# Patient Record
Sex: FEMALE | Race: WHITE | NOT HISPANIC OR LATINO | Employment: OTHER | URBAN - METROPOLITAN AREA
[De-identification: names, ages, dates, MRNs, and addresses within clinical notes are randomized per-mention and may not be internally consistent; named-entity substitution may affect disease eponyms.]

---

## 2017-06-01 ENCOUNTER — HOSPITAL ENCOUNTER (OUTPATIENT)
Dept: MAMMOGRAPHY | Facility: CLINIC | Age: 67
Discharge: HOME | End: 2017-06-01
Attending: FAMILY MEDICINE

## 2017-06-01 DIAGNOSIS — Z12.31 VISIT FOR SCREENING MAMMOGRAM: ICD-10-CM

## 2017-06-05 ENCOUNTER — RECORDS - HEALTHEAST (OUTPATIENT)
Dept: LAB | Facility: CLINIC | Age: 67
End: 2017-06-05

## 2017-06-05 LAB
ALT SERPL-CCNC: 10 U/L (ref 0–45)
AST SERPL-CCNC: 13 U/L (ref 0–40)
CHOLEST SERPL-MCNC: 237 MG/DL
CREAT SERPL-MCNC: 0.78 MG/DL (ref 0.6–1.1)
FASTING STATUS PATIENT QL REPORTED: YES
GFR SERPL CREATININE-BSD FRML MDRD: >60 ML/MIN/1.73M2
GLUCOSE SERPL-MCNC: 85 MG/DL (ref 70–125)
HDLC SERPL-MCNC: 68 MG/DL
LDLC SERPL CALC-MCNC: 144 MG/DL
POTASSIUM SERPL-SCNC: 4.3 MMOL/L (ref 3.5–5)
TRIGL SERPL-MCNC: 125 MG/DL

## 2017-12-01 ENCOUNTER — RECORDS - HEALTHEAST (OUTPATIENT)
Dept: LAB | Facility: CLINIC | Age: 67
End: 2017-12-01

## 2017-12-01 ENCOUNTER — TRANSFERRED RECORDS (OUTPATIENT)
Dept: HEALTH INFORMATION MANAGEMENT | Facility: CLINIC | Age: 67
End: 2017-12-01

## 2017-12-01 LAB
ALT SERPL-CCNC: 12 U/L (ref 0–45)
AST SERPL-CCNC: 18 U/L (ref 0–40)
CHOLEST SERPL-MCNC: 240 MG/DL
CREAT SERPL-MCNC: 0.71 MG/DL (ref 0.6–1.1)
FASTING STATUS PATIENT QL REPORTED: YES
GFR SERPL CREATININE-BSD FRML MDRD: >60 ML/MIN/1.73M2
GLUCOSE SERPL-MCNC: 93 MG/DL (ref 70–125)
HDLC SERPL-MCNC: 67 MG/DL
LDLC SERPL CALC-MCNC: 153 MG/DL
POTASSIUM SERPL-SCNC: 4.6 MMOL/L (ref 3.5–5)
TRIGL SERPL-MCNC: 99 MG/DL

## 2017-12-29 ENCOUNTER — TRANSFERRED RECORDS (OUTPATIENT)
Dept: HEALTH INFORMATION MANAGEMENT | Facility: CLINIC | Age: 67
End: 2017-12-29

## 2018-03-19 ENCOUNTER — PRE VISIT (OUTPATIENT)
Dept: NEUROLOGY | Facility: CLINIC | Age: 68
End: 2018-03-19

## 2018-03-19 NOTE — TELEPHONE ENCOUNTER
PREVISIT INFORMATION                                                    Anais Shipmanrs scheduled for future visit at [facility] specialty clinics.    Patient is scheduled to see Dr Hernandez on 3/27  Reason for visit:   Referring provider   Has patient seen previous specialist? Yes.  Name of provider Dr Hernandez.   Medical Records:      REVIEW                                                      New patient packet mailed to patient:  Medication reconciliation complete:       Current Outpatient Prescriptions   Medication Sig Dispense Refill     AMOXICILLIN PO Take  by mouth 2 times daily.       FEXOFENADINE  MG OR TABS As needed  .        TRIAZOLAM 0.25 MG OR TABS 1 TABLET AT BEDTIME AS NEEDED       LISINOPRIL 10 MG OR TABS 1 TABLET DAILY       PREMARIN 0.9 MG OR TABS 1 TABLET DAILY       CENTRUM SILVER OR 1 daily        VITAMIN D-3 OR 1 daily       CALCIUM 600 600 MG OR TABS 1 daily       FIBER OR TABS as needed       FLONASE 50 MCG/ACT NA SUSP as needed         Allergies: Codeine and Diphenhydramine        PLAN/FOLLOW-UP NEEDED                                                      The following is needed before upcoming appointment. The pt will call back to complete the previsit.*    Patient Reminders Given:  Please, make sure you bring an updated list of your medications.   If you are having a procedure, please, present 15 minutes early.  If you need to cancel or reschedule,please call [phone].    Roberta Villagran

## 2018-03-27 ENCOUNTER — OFFICE VISIT (OUTPATIENT)
Dept: NEUROLOGY | Facility: CLINIC | Age: 68
End: 2018-03-27
Payer: COMMERCIAL

## 2018-03-27 VITALS
SYSTOLIC BLOOD PRESSURE: 137 MMHG | WEIGHT: 156.6 LBS | BODY MASS INDEX: 24.58 KG/M2 | HEIGHT: 67 IN | DIASTOLIC BLOOD PRESSURE: 92 MMHG | OXYGEN SATURATION: 98 % | HEART RATE: 75 BPM

## 2018-03-27 DIAGNOSIS — G62.9 SMALL FIBER NEUROPATHY: Primary | ICD-10-CM

## 2018-03-27 PROCEDURE — 99202 OFFICE O/P NEW SF 15 MIN: CPT | Performed by: PSYCHIATRY & NEUROLOGY

## 2018-03-27 RX ORDER — LIDOCAINE 50 MG/G
PATCH TOPICAL
Qty: 30 PATCH | Refills: 1 | Status: SHIPPED | OUTPATIENT
Start: 2018-03-27 | End: 2021-01-01

## 2018-03-27 RX ORDER — OLOPATADINE HYDROCHLORIDE 2 MG/ML
1 SOLUTION/ DROPS OPHTHALMIC DAILY PRN
COMMUNITY
End: 2021-01-01

## 2018-03-27 RX ORDER — LORAZEPAM 1 MG/1
TABLET ORAL
Refills: 2 | COMMUNITY
Start: 2018-03-17 | End: 2021-01-01

## 2018-03-27 ASSESSMENT — PAIN SCALES - GENERAL: PAINLEVEL: NO PAIN (1)

## 2018-03-27 NOTE — PROGRESS NOTES
March 27, 2018      Fernando Sosa MD   [facility]    [address] [place] [address]      Dear Dr. Sosa:       I saw Anais Kelly at the [facility] in [place] today.  I saw her in 2009 and 2011 for symptoms and signs of an idiopathic small fiber neuropathy.  She comes today reporting some progression of symptoms with occasional burning discomfort and shooting pains in the feet and fingertips.  Feet at times become red and often feel hot subjectively even though they are cold to the touch.  The foot discomfort does bother her particularly at night.  She is otherwise in good health.      EXAMINATION:  Mental state and cranial nerve examinations are normal.  Perception of light touch is preserved.  Vibration scores are variable but are generally between 3 and 6 at the great toes.  Pinprick perception is reduced distal to the proximal calves.  Tone, bulk, strength and rapid repetitive movements are normal in all 4 limbs.  Coordination is normal.  Reflexes are 1+ throughout and plantar responses are downgoing.  Gait is normal.  Romberg sign is not present.  Examination of the feet demonstrates normal color, intact skin, and well preserved pedal pulses.      LABORATORY STUDIES:  Not available through Epic.  I do see that a random blood sugar and CMP were normal in 2017 and her lipid panel demonstrates an elevated total cholesterol.  There is no family history of a similar problem.      I explained to Ms. Kelly that her signs are consistent with an idiopathic small fiber neuropathy and indeed her skin biopsy many years ago was reportedly supportive of this diagnosis, although I had difficulty finding the report today.  Her examination demonstrates an interval rostral progression of pinprick sensory deficits with no other changes.  I discussed the prognosis somewhat.  I indicated that I would be interested in reviewing laboratory findings  obtained previously through your clinic.  She has completed a record request for this.  She is interested in treatment to alleviate the discomfort at night and I have started her on Lidoderm patches.  She is welcome to return in 1 year or as needed.         Sincerely,      DIANA AHMADI MD             D: 2018   T: 2018   MT: SYLVIA      Name:     ORTEGA REESE   MRN:      [id number]        Account:      [id number]   :      1950      Document: [id number]       cc: Fernando Sosa MD

## 2018-03-27 NOTE — NURSING NOTE
"Anais Shipmanrs's goals for this visit include: consult  She requests these members of her care team be copied on today's visit information:     PCP: Fernando Sosa    Referring Provider:  Referred Self, MD  No address on file    Chief Complaint   Patient presents with     Consult       Initial BP (!) 137/92  Pulse 75  Ht 1.702 m (5' 7\")  Wt 71 kg (156 lb 9.6 oz)  SpO2 98%  BMI 24.53 kg/m2 Estimated body mass index is 24.53 kg/(m^2) as calculated from the following:    Height as of this encounter: 1.702 m (5' 7\").    Weight as of this encounter: 71 kg (156 lb 9.6 oz).  Medication Reconciliation: complete    Do you need any medication refills at today's visit? n  "

## 2018-03-27 NOTE — MR AVS SNAPSHOT
After Visit Summary   3/27/2018    Anais Kelly    MRN: [id number]           Patient Information     Date Of Birth          1950        Visit Information        Provider Department      3/27/2018 1:00 PM Joel Hernandez MD [facility] [[email or web address]]        Today's Diagnoses     Small fiber neuropathy    -  1      Care Instructions    1. Try lidoderm patches on the soles of your feet at night. If that does not help or is not covered, call and we can try something else (a gel). Apply only to intact skin.   2. We will request records from Dr. Sosa's office. I will let you know if there are other lab tests that I recommend.             Follow-ups after your visit        Who to contact     If you have questions or need follow up information about today's clinic visit or your schedule please contact [facility] directly at [phone].  Normal or non-critical lab and imaging results will be communicated to you by MyChart, letter or phone within 4 business days after the clinic has received the results. If you do not hear from us within 7 days, please contact the clinic through [employer] or phone. If you have a critical or abnormal lab result, we will notify you by phone as soon as possible.  Submit refill requests through [employer] or call your pharmacy and they will forward the refill request to us. Please allow 3 business days for your refill to be completed.          Additional Information About Your Visit        MyChart Information     Sh[employer] is an electronic gateway that provides easy, online access to your medical records. With [employer], you can request a clinic appointment, read your test results, renew a prescription or communicate with your care team.     To sign up for [employer] visit the website at [email or web address]   You will be asked to enter the access code listed below, as well as some personal information. Please follow the directions to create your username  "and password.     Your access code is: [email or web address]  Expires: 2018  1:59 PM     Your access code will  in 90 days. If you need help or a new code, please contact your [facility] or call [phone] for assistance.        Care EveryWhere ID     This is your Care EveryWhere ID. This could be used by other organizations to access your [facility] medical records  [id number]        Your Vitals Were     Pulse Height Pulse Oximetry BMI (Body Mass Index)          75 1.702 m (5' 7\") 98% 24.53 kg/m2         Blood Pressure from Last 3 Encounters:   18 (!) 137/92   11 137/99   09 144/88    Weight from Last 3 Encounters:   18 71 kg (156 lb 9.6 oz)   11 72.4 kg (159 lb 9.6 oz)   08 73 kg (161 lb)              Today, you had the following     No orders found for display         Today's Medication Changes          These changes are accurate as of 3/27/18  1:59 PM.  If you have any questions, ask your nurse or doctor.               Start taking these medicines.        Dose/Directions    lidocaine 5 % Patch   Commonly known as:  LIDODERM   Used for:  Small fiber neuropathy   Started by:  Joel Hernandez MD        Apply to soles of feet at night and remove in AM. Do not wear more than 12 hours out of 24 and apply only to intact skin.   Quantity:  30 patch   Refills:  1         Stop taking these medicines if you haven't already. Please contact your care team if you have questions.     AMOXICILLIN PO   Stopped by:  Joel Hernandez MD                Where to get your medicines      Call your pharmacy to confirm that your medication is ready for pickup. It may take up to 24 hours for them to receive the prescription. If the prescription is not ready within 3 business days, please contact your clinic or your provider.     We will let you know when these medications are ready. If you don't hear back within 3 business days, please contact us.     lidocaine 5 % Patch    "             Primary Care Provider Office Phone # Fax #    Fernando Sosa [phone]       [facility] [address] [place] [address]        Equal Access to Services     SHWETA MONTALVO : Hadii yeny padilla emilee Soerika, waservandoda luqadaha, qaybta kaalmada neema, tanvi cassidybrit gordon. So [facility] [phone].    ATENCIÓN: Si habla español, tiene a real disposición servicios gratuitos de asistencia lingüística. Llame al [phone].    We comply with applicable federal civil rights laws and [place] laws. We do not discriminate on the basis of race, color, national origin, age, disability, sex, sexual orientation, or gender identity.            Thank you!     Thank you for choosing [facility]  for your care. Our goal is always to provide you with excellent care. Hearing back from our patients is one way we can continue to improve our services. Please take a few minutes to complete the written survey that you may receive in the mail after your visit with us. Thank you!             Your Updated Medication List - Protect others around you: Learn how to safely use, store and throw away your medicines at www.disposemymeds.org.          This list is accurate as of 3/27/18  1:59 PM.  Always use your most recent med list.                   Brand Name Dispense Instructions for use Diagnosis    BENTYL PO      Take 10 mg by mouth 2 times daily        calcium carbonate 600 MG tablet   Generic drug:  calcium carbonate      1 daily        CENTRUM SILVER PO      1 daily        fexofenadine 180 MG tablet    ALLEGRA     As needed  .        Fiber Tabs      as needed        FLONASE 50 MCG/ACT spray   Generic drug:  fluticasone      as needed        lidocaine 5 % Patch    LIDODERM    30 patch    Apply to soles of feet at night and remove in AM. Do not wear more than 12 hours out of 24 and apply only to intact skin.    Small fiber neuropathy       lisinopril 10 MG  tablet    PRINIVIL/ZESTRIL     1 TABLET DAILY        LORazepam 1 MG tablet    ATIVAN     TAKE 1 TABLET BY MOUTH AT BEDTIME AS NEEDED FOR SLEEP        omeprazole 20 MG CR capsule    priLOSEC     daily        PATADAY 0.2 % Soln   Generic drug:  olopatadine HCl      1 drop daily as needed        PREMARIN 0.9 MG Tabs tablet   Generic drug:  estrogens conjugated      1 TABLET DAILY        VITAMIN D-3 OR      5000 units 1 daily

## 2018-03-27 NOTE — PATIENT INSTRUCTIONS
1. Try lidoderm patches on the soles of your feet at night. If that does not help or is not covered, call and we can try something else (a gel). Apply only to intact skin.   2. We will request records from Dr. Sosa's office. I will let you know if there are other lab tests that I recommend.

## 2018-04-02 ENCOUNTER — RECORDS - HEALTHEAST (OUTPATIENT)
Dept: ADMINISTRATIVE | Facility: OTHER | Age: 68
End: 2018-04-02

## 2018-04-06 ENCOUNTER — COMMUNICATION - HEALTHEAST (OUTPATIENT)
Dept: TELEHEALTH | Facility: CLINIC | Age: 68
End: 2018-04-06

## 2018-04-06 ENCOUNTER — HOSPITAL ENCOUNTER (OUTPATIENT)
Dept: CT IMAGING | Facility: CLINIC | Age: 68
Discharge: HOME | End: 2018-04-06

## 2018-04-06 ENCOUNTER — TELEPHONE (OUTPATIENT)
Dept: NEUROLOGY | Facility: CLINIC | Age: 68
End: 2018-04-06

## 2018-04-06 DIAGNOSIS — R10.84 DIFFUSE ABDOMINAL PAIN: ICD-10-CM

## 2018-04-06 DIAGNOSIS — G62.9 SMALL FIBER NEUROPATHY: Primary | ICD-10-CM

## 2018-04-06 LAB
CREAT BLD-MCNC: 0.8 MG/DL
POC GFR AMER AF HE - HISTORICAL: >60 ML/MIN/1.73M2
POC GFR NON AMER AF HE - HISTORICAL: >60 ML/MIN/1.73M2

## 2018-04-09 ENCOUNTER — RECORDS - HEALTHEAST (OUTPATIENT)
Dept: ADMINISTRATIVE | Facility: OTHER | Age: 68
End: 2018-04-09

## 2018-04-09 NOTE — TELEPHONE ENCOUNTER
Central Prior Authorization Team   Phone: [phone]      PA Initiation    Medication: clonidine 0.1% plo cream - compound- PA Initiated  Insurance Company: [employer] - Phone [phone] Fax [phone]  Pharmacy Filling the Rx: [facility] - [place], [place] - [address]  Filling Pharmacy Phone: [phone]  Filling Pharmacy Fax: [phone]  Start Date: 4/9/2018

## 2018-04-10 ENCOUNTER — RECORDS - HEALTHEAST (OUTPATIENT)
Dept: ADMINISTRATIVE | Facility: OTHER | Age: 68
End: 2018-04-10

## 2018-04-10 NOTE — TELEPHONE ENCOUNTER
Dr Hernandez, the Clonidine gel was denied. You mentioned that we could try the Ketamine, Gabapentin and Amitriptyline gel. This is pending in  for you to review and sign.

## 2018-04-10 NOTE — TELEPHONE ENCOUNTER
PRIOR AUTHORIZATION DENIED    Medication: clonidine 0.1% plo cream - compound- P/A DENIED    Denial Date: 4/10/2018    Denial Rational: Bulk powders are not covered        Appeal Information:

## 2018-04-11 NOTE — TELEPHONE ENCOUNTER
The pt was notified that the Clonidine gel PA was denied. She was informed that another RX for the combination gel (ketamine+Gabapentin+Amitriptyline) was sent to the pharmacy to see if that will be covered. She verbalized understanding.  Roberta Villagran RN

## 2018-04-16 ENCOUNTER — TELEPHONE (OUTPATIENT)
Dept: PALLIATIVE MEDICINE | Facility: CLINIC | Age: 68
End: 2018-04-16

## 2018-04-16 NOTE — TELEPHONE ENCOUNTER
A prior authorization is needed for the following medication prescribed.  Please complete a prior authorization with the information included below.    Medication:FV-Ketamine 5%, Amitrip 3%, Gabapentin 4% in PLO (compounded)  Ingredients: Ketamine Powder [address]-94, Amitriptyline powder [address]-86, Gabapentin Powder [address]-66, Advanced cream [address]-74  RX #:[id number]  Reason for Rejection:Drug excluded    Pharmacy Insurance plan: Part D  BIN #: [id number]  ID #: [id number]  PCN #: [id number]  Phone #: [phone]      Pharmacy NPI:[id number]      Please advise the pharmacy when the prior authorization is approved or denied.     Thank you for your time.    [facility]  [phone]

## 2018-04-17 DIAGNOSIS — G62.9 SMALL FIBER NEUROPATHY: ICD-10-CM

## 2018-04-17 DIAGNOSIS — R79.89 OTHER SPECIFIED ABNORMAL FINDINGS OF BLOOD CHEMISTRY: ICD-10-CM

## 2018-04-17 LAB
HBA1C MFR BLD: 5.3 % (ref 0–5.6)
VIT B12 SERPL-MCNC: 396 PG/ML (ref 193–986)

## 2018-04-17 PROCEDURE — 86160 COMPLEMENT ANTIGEN: CPT | Performed by: PSYCHIATRY & NEUROLOGY

## 2018-04-17 PROCEDURE — 36415 COLL VENOUS BLD VENIPUNCTURE: CPT | Performed by: PSYCHIATRY & NEUROLOGY

## 2018-04-17 PROCEDURE — 82607 VITAMIN B-12: CPT | Performed by: PSYCHIATRY & NEUROLOGY

## 2018-04-17 PROCEDURE — 86235 NUCLEAR ANTIGEN ANTIBODY: CPT | Performed by: PSYCHIATRY & NEUROLOGY

## 2018-04-17 PROCEDURE — 83921 ORGANIC ACID SINGLE QUANT: CPT | Mod: 90 | Performed by: PSYCHIATRY & NEUROLOGY

## 2018-04-17 PROCEDURE — 83036 HEMOGLOBIN GLYCOSYLATED A1C: CPT | Performed by: PSYCHIATRY & NEUROLOGY

## 2018-04-17 PROCEDURE — 99000 SPECIMEN HANDLING OFFICE-LAB: CPT | Performed by: PSYCHIATRY & NEUROLOGY

## 2018-04-18 LAB
C3 SERPL-MCNC: 93 MG/DL (ref 76–169)
C4 SERPL-MCNC: 14 MG/DL (ref 15–50)
ENA SS-A IGG SER IA-ACNC: <0.2 AI (ref 0–0.9)
ENA SS-B IGG SER IA-ACNC: <0.2 AI (ref 0–0.9)

## 2018-04-19 LAB — METHYLMALONATE SERPL-SCNC: 0.18 UMOL/L (ref 0–0.4)

## 2018-04-20 NOTE — TELEPHONE ENCOUNTER
I called patient and left message communicating Dr. Hernandez's message. Pt has our number to call back with questions. Ree Pastor RN

## 2018-04-20 NOTE — TELEPHONE ENCOUNTER
I think she should discuss medications for pain with her PCP. If he would like my input on neuropathic pain options she should make another appointment.

## 2018-04-20 NOTE — TELEPHONE ENCOUNTER
Central Prior Authorization Team   Phone: [phone]    PA Initiation    Medication: FV-Ketamine 5%, Amitrip 3%, Gabapentin 4% in PLO (compounded)-Initiated-  Insurance Company: [employer] - Phone [phone] Fax [phone]  Pharmacy Filling the Rx: [facility] - [place], [place] - [address]  Filling Pharmacy Phone: [phone]  Filling Pharmacy Fax:    Start Date: 4/20/2018

## 2018-04-20 NOTE — TELEPHONE ENCOUNTER
PRIOR AUTHORIZATION DENIED    Medication: FV-Ketamine 5%, Amitrip 3%, Gabapentin 4% in PLO (compounded)-P/A DENIED    Denial Date: 4/20/2018    Denial Rational: Bulk compounding powders are not covered        Appeal Information:

## 2018-06-21 ENCOUNTER — RECORDS - HEALTHEAST (OUTPATIENT)
Dept: ADMINISTRATIVE | Facility: OTHER | Age: 68
End: 2018-06-21

## 2018-07-06 ENCOUNTER — RECORDS - HEALTHEAST (OUTPATIENT)
Dept: ADMINISTRATIVE | Facility: OTHER | Age: 68
End: 2018-07-06

## 2018-07-10 ENCOUNTER — HOSPITAL ENCOUNTER (OUTPATIENT)
Dept: MAMMOGRAPHY | Facility: CLINIC | Age: 68
Discharge: HOME | End: 2018-07-10
Attending: FAMILY MEDICINE

## 2018-07-10 DIAGNOSIS — Z12.31 VISIT FOR SCREENING MAMMOGRAM: ICD-10-CM

## 2018-07-17 ENCOUNTER — OFFICE VISIT - HEALTHEAST (OUTPATIENT)
Dept: INTERNAL MEDICINE | Facility: CLINIC | Age: 68
End: 2018-07-17

## 2018-07-17 ENCOUNTER — COMMUNICATION - HEALTHEAST (OUTPATIENT)
Dept: TELEHEALTH | Facility: CLINIC | Age: 68
End: 2018-07-17

## 2018-07-17 DIAGNOSIS — R20.0 NUMBNESS AND TINGLING IN LEFT ARM: ICD-10-CM

## 2018-07-17 DIAGNOSIS — R10.31 ABDOMINAL PAIN, CHRONIC, BILATERAL LOWER QUADRANT: ICD-10-CM

## 2018-07-17 DIAGNOSIS — R20.2 NUMBNESS AND TINGLING IN LEFT ARM: ICD-10-CM

## 2018-07-17 DIAGNOSIS — G62.9 SMALL FIBER NEUROPATHY: ICD-10-CM

## 2018-07-17 DIAGNOSIS — R10.31 ABDOMINAL PAIN, RIGHT LOWER QUADRANT: ICD-10-CM

## 2018-07-17 DIAGNOSIS — F51.01 PRIMARY INSOMNIA: ICD-10-CM

## 2018-07-17 DIAGNOSIS — G89.29 ABDOMINAL PAIN, CHRONIC, BILATERAL LOWER QUADRANT: ICD-10-CM

## 2018-07-17 DIAGNOSIS — I10 BENIGN ESSENTIAL HYPERTENSION: ICD-10-CM

## 2018-07-17 DIAGNOSIS — G60.9 IDIOPATHIC PERIPHERAL NEUROPATHY: ICD-10-CM

## 2018-07-17 DIAGNOSIS — R10.32 ABDOMINAL PAIN, CHRONIC, BILATERAL LOWER QUADRANT: ICD-10-CM

## 2018-07-17 DIAGNOSIS — K58.0 IRRITABLE BOWEL SYNDROME WITH DIARRHEA: ICD-10-CM

## 2018-07-17 LAB
ALBUMIN SERPL-MCNC: 3.9 G/DL (ref 3.5–5)
ALBUMIN UR-MCNC: NEGATIVE MG/DL
ALP SERPL-CCNC: 84 U/L (ref 45–120)
ALT SERPL W P-5'-P-CCNC: 10 U/L (ref 0–45)
ANION GAP SERPL CALCULATED.3IONS-SCNC: 10 MMOL/L (ref 5–18)
APPEARANCE UR: CLEAR
AST SERPL W P-5'-P-CCNC: 13 U/L (ref 0–40)
BACTERIA #/AREA URNS HPF: ABNORMAL HPF
BASOPHILS # BLD AUTO: 0.1 THOU/UL (ref 0–0.2)
BASOPHILS NFR BLD AUTO: 1 % (ref 0–2)
BILIRUB SERPL-MCNC: 0.4 MG/DL (ref 0–1)
BILIRUB UR QL STRIP: NEGATIVE
BUN SERPL-MCNC: 16 MG/DL (ref 8–28)
C REACTIVE PROTEIN LHE: 0.4 MG/DL (ref 0–0.8)
CALCIUM SERPL-MCNC: 10.9 MG/DL (ref 8.5–10.5)
CHLORIDE BLD-SCNC: 104 MMOL/L (ref 98–107)
CO2 SERPL-SCNC: 25 MMOL/L (ref 22–31)
COLOR UR AUTO: YELLOW
CREAT SERPL-MCNC: 0.78 MG/DL (ref 0.6–1.1)
EOSINOPHIL # BLD AUTO: 0.2 THOU/UL (ref 0–0.4)
EOSINOPHIL NFR BLD AUTO: 3 % (ref 0–6)
ERYTHROCYTE [DISTWIDTH] IN BLOOD BY AUTOMATED COUNT: 12.2 % (ref 11–14.5)
ERYTHROCYTE [SEDIMENTATION RATE] IN BLOOD BY WESTERGREN METHOD: 3 MM/HR (ref 0–20)
GFR SERPL CREATININE-BSD FRML MDRD: >60 ML/MIN/1.73M2
GLUCOSE BLD-MCNC: 88 MG/DL (ref 70–125)
GLUCOSE UR STRIP-MCNC: NEGATIVE MG/DL
HCT VFR BLD AUTO: 41 % (ref 35–47)
HGB BLD-MCNC: 13.6 G/DL (ref 12–16)
HGB UR QL STRIP: NEGATIVE
KETONES UR STRIP-MCNC: NEGATIVE MG/DL
LEUKOCYTE ESTERASE UR QL STRIP: ABNORMAL
LYMPHOCYTES # BLD AUTO: 2 THOU/UL (ref 0.8–4.4)
LYMPHOCYTES NFR BLD AUTO: 31 % (ref 20–40)
MAGNESIUM SERPL-MCNC: 2.1 MG/DL (ref 1.8–2.6)
MCH RBC QN AUTO: 31.1 PG (ref 27–34)
MCHC RBC AUTO-ENTMCNC: 33.3 G/DL (ref 32–36)
MCV RBC AUTO: 94 FL (ref 80–100)
MONOCYTES # BLD AUTO: 0.6 THOU/UL (ref 0–0.9)
MONOCYTES NFR BLD AUTO: 9 % (ref 2–10)
NEUTROPHILS # BLD AUTO: 3.6 THOU/UL (ref 2–7.7)
NEUTROPHILS NFR BLD AUTO: 55 % (ref 50–70)
NITRATE UR QL: NEGATIVE
PH UR STRIP: 7 [PH] (ref 5–8)
PLATELET # BLD AUTO: 252 THOU/UL (ref 140–440)
PMV BLD AUTO: 6.9 FL (ref 7–10)
POTASSIUM BLD-SCNC: 4.5 MMOL/L (ref 3.5–5)
PROT SERPL-MCNC: 6.8 G/DL (ref 6–8)
RBC # BLD AUTO: 4.37 MILL/UL (ref 3.8–5.4)
RBC #/AREA URNS AUTO: ABNORMAL HPF
SODIUM SERPL-SCNC: 139 MMOL/L (ref 136–145)
SP GR UR STRIP: 1.01 (ref 1–1.03)
SQUAMOUS #/AREA URNS AUTO: ABNORMAL LPF
TSH SERPL DL<=0.005 MIU/L-ACNC: 3.02 UIU/ML (ref 0.3–5)
UROBILINOGEN UR STRIP-ACNC: ABNORMAL
VIT B12 SERPL-MCNC: 420 PG/ML (ref 213–816)
WBC #/AREA URNS AUTO: ABNORMAL HPF
WBC: 6.5 THOU/UL (ref 4–11)

## 2018-07-17 ASSESSMENT — MIFFLIN-ST. JEOR: SCORE: 1225.85

## 2018-07-18 LAB — BACTERIA SPEC CULT: NO GROWTH

## 2018-07-19 LAB — METHYLMALONATE SERPL-SCNC: 14.36 UMOL/L (ref 0–0.4)

## 2018-07-22 ENCOUNTER — AMBULATORY - HEALTHEAST (OUTPATIENT)
Dept: INTERNAL MEDICINE | Facility: CLINIC | Age: 68
End: 2018-07-22

## 2018-07-23 ENCOUNTER — COMMUNICATION - HEALTHEAST (OUTPATIENT)
Dept: INTERNAL MEDICINE | Facility: CLINIC | Age: 68
End: 2018-07-23

## 2018-07-24 ENCOUNTER — AMBULATORY - HEALTHEAST (OUTPATIENT)
Dept: NURSING | Facility: CLINIC | Age: 68
End: 2018-07-24

## 2018-07-24 ENCOUNTER — RECORDS - HEALTHEAST (OUTPATIENT)
Dept: ADMINISTRATIVE | Facility: OTHER | Age: 68
End: 2018-07-24

## 2018-07-30 ENCOUNTER — RECORDS - HEALTHEAST (OUTPATIENT)
Dept: ADMINISTRATIVE | Facility: OTHER | Age: 68
End: 2018-07-30

## 2018-07-30 ENCOUNTER — AMBULATORY - HEALTHEAST (OUTPATIENT)
Dept: INTERNAL MEDICINE | Facility: CLINIC | Age: 68
End: 2018-07-30

## 2018-07-30 DIAGNOSIS — E83.52 HYPERCALCEMIA: ICD-10-CM

## 2018-07-30 DIAGNOSIS — E53.8 VITAMIN B12 DEFICIENCY (NON ANEMIC): ICD-10-CM

## 2018-08-09 ENCOUNTER — AMBULATORY - HEALTHEAST (OUTPATIENT)
Dept: NURSING | Facility: CLINIC | Age: 68
End: 2018-08-09

## 2018-08-22 ENCOUNTER — OFFICE VISIT - HEALTHEAST (OUTPATIENT)
Dept: INTERNAL MEDICINE | Facility: CLINIC | Age: 68
End: 2018-08-22

## 2018-08-22 DIAGNOSIS — Z98.890 HX OF ABDOMINAL SURGERY: ICD-10-CM

## 2018-08-22 DIAGNOSIS — R10.30 LOWER ABDOMINAL PAIN: ICD-10-CM

## 2018-09-12 ENCOUNTER — OFFICE VISIT - HEALTHEAST (OUTPATIENT)
Dept: SURGERY | Facility: CLINIC | Age: 68
End: 2018-09-12

## 2018-09-12 DIAGNOSIS — R10.13 ABDOMINAL PAIN, EPIGASTRIC: ICD-10-CM

## 2018-09-12 ASSESSMENT — MIFFLIN-ST. JEOR: SCORE: 1232.2

## 2018-09-13 ENCOUNTER — AMBULATORY - HEALTHEAST (OUTPATIENT)
Dept: NURSING | Facility: CLINIC | Age: 68
End: 2018-09-13

## 2018-09-25 ENCOUNTER — OFFICE VISIT - HEALTHEAST (OUTPATIENT)
Dept: PHYSICAL THERAPY | Facility: REHABILITATION | Age: 68
End: 2018-09-25

## 2018-09-25 DIAGNOSIS — M79.18 MYOFASCIAL PAIN: ICD-10-CM

## 2018-09-25 DIAGNOSIS — R10.84 GENERALIZED ABDOMINAL PAIN: ICD-10-CM

## 2018-09-28 ENCOUNTER — OFFICE VISIT - HEALTHEAST (OUTPATIENT)
Dept: PHYSICAL THERAPY | Facility: REHABILITATION | Age: 68
End: 2018-09-28

## 2018-09-28 DIAGNOSIS — M79.18 MYOFASCIAL PAIN: ICD-10-CM

## 2018-09-28 DIAGNOSIS — R10.84 GENERALIZED ABDOMINAL PAIN: ICD-10-CM

## 2018-10-10 ENCOUNTER — COMMUNICATION - HEALTHEAST (OUTPATIENT)
Dept: INTERNAL MEDICINE | Facility: CLINIC | Age: 68
End: 2018-10-10

## 2018-10-10 ENCOUNTER — OFFICE VISIT - HEALTHEAST (OUTPATIENT)
Dept: PHYSICAL THERAPY | Facility: REHABILITATION | Age: 68
End: 2018-10-10

## 2018-10-10 DIAGNOSIS — M79.18 MYOFASCIAL PAIN: ICD-10-CM

## 2018-10-10 DIAGNOSIS — R10.84 GENERALIZED ABDOMINAL PAIN: ICD-10-CM

## 2018-10-11 ENCOUNTER — AMBULATORY - HEALTHEAST (OUTPATIENT)
Dept: NURSING | Facility: CLINIC | Age: 68
End: 2018-10-11

## 2018-10-20 ENCOUNTER — RECORDS - HEALTHEAST (OUTPATIENT)
Dept: ADMINISTRATIVE | Facility: OTHER | Age: 68
End: 2018-10-20

## 2018-10-24 ENCOUNTER — OFFICE VISIT - HEALTHEAST (OUTPATIENT)
Dept: PHYSICAL THERAPY | Facility: REHABILITATION | Age: 68
End: 2018-10-24

## 2018-10-24 DIAGNOSIS — R10.84 GENERALIZED ABDOMINAL PAIN: ICD-10-CM

## 2018-10-24 DIAGNOSIS — M79.18 MYOFASCIAL PAIN: ICD-10-CM

## 2018-11-05 ENCOUNTER — OFFICE VISIT - HEALTHEAST (OUTPATIENT)
Dept: PHYSICAL THERAPY | Facility: REHABILITATION | Age: 68
End: 2018-11-05

## 2018-11-05 ENCOUNTER — RECORDS - HEALTHEAST (OUTPATIENT)
Dept: ADMINISTRATIVE | Facility: OTHER | Age: 68
End: 2018-11-05

## 2018-11-05 DIAGNOSIS — R10.84 GENERALIZED ABDOMINAL PAIN: ICD-10-CM

## 2018-11-05 DIAGNOSIS — M79.18 MYOFASCIAL PAIN: ICD-10-CM

## 2018-11-13 ENCOUNTER — AMBULATORY - HEALTHEAST (OUTPATIENT)
Dept: NURSING | Facility: CLINIC | Age: 68
End: 2018-11-13

## 2018-11-16 ENCOUNTER — OFFICE VISIT - HEALTHEAST (OUTPATIENT)
Dept: PHYSICAL THERAPY | Facility: REHABILITATION | Age: 68
End: 2018-11-16

## 2018-11-16 DIAGNOSIS — R10.84 GENERALIZED ABDOMINAL PAIN: ICD-10-CM

## 2018-11-16 DIAGNOSIS — M79.18 MYOFASCIAL PAIN: ICD-10-CM

## 2018-11-23 ENCOUNTER — OFFICE VISIT - HEALTHEAST (OUTPATIENT)
Dept: PHYSICAL THERAPY | Facility: REHABILITATION | Age: 68
End: 2018-11-23

## 2018-11-23 DIAGNOSIS — M79.18 MYOFASCIAL PAIN: ICD-10-CM

## 2018-11-23 DIAGNOSIS — R10.84 GENERALIZED ABDOMINAL PAIN: ICD-10-CM

## 2018-11-26 ENCOUNTER — RECORDS - HEALTHEAST (OUTPATIENT)
Dept: ADMINISTRATIVE | Facility: OTHER | Age: 68
End: 2018-11-26

## 2018-11-30 ENCOUNTER — OFFICE VISIT - HEALTHEAST (OUTPATIENT)
Dept: PHYSICAL THERAPY | Facility: REHABILITATION | Age: 68
End: 2018-11-30

## 2018-11-30 DIAGNOSIS — M79.18 MYOFASCIAL PAIN: ICD-10-CM

## 2018-11-30 DIAGNOSIS — R10.84 GENERALIZED ABDOMINAL PAIN: ICD-10-CM

## 2018-12-03 ENCOUNTER — OFFICE VISIT - HEALTHEAST (OUTPATIENT)
Dept: PHYSICAL THERAPY | Facility: REHABILITATION | Age: 68
End: 2018-12-03

## 2018-12-03 DIAGNOSIS — M79.18 MYOFASCIAL PAIN: ICD-10-CM

## 2018-12-03 DIAGNOSIS — R10.84 GENERALIZED ABDOMINAL PAIN: ICD-10-CM

## 2018-12-11 ENCOUNTER — OFFICE VISIT - HEALTHEAST (OUTPATIENT)
Dept: PHYSICAL THERAPY | Facility: REHABILITATION | Age: 68
End: 2018-12-11

## 2018-12-11 DIAGNOSIS — R10.84 GENERALIZED ABDOMINAL PAIN: ICD-10-CM

## 2018-12-11 DIAGNOSIS — M79.18 MYOFASCIAL PAIN: ICD-10-CM

## 2018-12-12 ENCOUNTER — OFFICE VISIT - HEALTHEAST (OUTPATIENT)
Dept: INTERNAL MEDICINE | Facility: CLINIC | Age: 68
End: 2018-12-12

## 2018-12-12 DIAGNOSIS — N64.4 BREAST PAIN, LEFT: ICD-10-CM

## 2018-12-12 DIAGNOSIS — F43.22 ADJUSTMENT DISORDER WITH ANXIETY: ICD-10-CM

## 2018-12-12 ASSESSMENT — MIFFLIN-ST. JEOR: SCORE: 1246.26

## 2018-12-18 ENCOUNTER — OFFICE VISIT - HEALTHEAST (OUTPATIENT)
Dept: PHYSICAL THERAPY | Facility: REHABILITATION | Age: 68
End: 2018-12-18

## 2018-12-18 DIAGNOSIS — R10.84 GENERALIZED ABDOMINAL PAIN: ICD-10-CM

## 2018-12-18 DIAGNOSIS — M79.18 MYOFASCIAL PAIN: ICD-10-CM

## 2018-12-28 ENCOUNTER — OFFICE VISIT - HEALTHEAST (OUTPATIENT)
Dept: PHYSICAL THERAPY | Facility: REHABILITATION | Age: 68
End: 2018-12-28

## 2018-12-28 DIAGNOSIS — R10.84 GENERALIZED ABDOMINAL PAIN: ICD-10-CM

## 2018-12-28 DIAGNOSIS — M79.18 MYOFASCIAL PAIN: ICD-10-CM

## 2018-12-31 ENCOUNTER — HOSPITAL ENCOUNTER (OUTPATIENT)
Dept: MAMMOGRAPHY | Facility: CLINIC | Age: 68
Discharge: HOME | End: 2018-12-31
Attending: INTERNAL MEDICINE

## 2018-12-31 DIAGNOSIS — N64.4 BREAST PAIN, LEFT: ICD-10-CM

## 2019-01-03 ENCOUNTER — COMMUNICATION - HEALTHEAST (OUTPATIENT)
Dept: ADMINISTRATIVE | Facility: CLINIC | Age: 69
End: 2019-01-03

## 2019-01-08 ENCOUNTER — AMBULATORY - HEALTHEAST (OUTPATIENT)
Dept: NURSING | Facility: CLINIC | Age: 69
End: 2019-01-08

## 2019-02-05 ENCOUNTER — OFFICE VISIT - HEALTHEAST (OUTPATIENT)
Dept: PHYSICAL THERAPY | Facility: REHABILITATION | Age: 69
End: 2019-02-05

## 2019-02-05 DIAGNOSIS — M79.18 MYOFASCIAL PAIN: ICD-10-CM

## 2019-02-05 DIAGNOSIS — R10.84 GENERALIZED ABDOMINAL PAIN: ICD-10-CM

## 2019-02-12 ENCOUNTER — AMBULATORY - HEALTHEAST (OUTPATIENT)
Dept: NURSING | Facility: CLINIC | Age: 69
End: 2019-02-12

## 2019-02-12 ENCOUNTER — OFFICE VISIT - HEALTHEAST (OUTPATIENT)
Dept: PHYSICAL THERAPY | Facility: REHABILITATION | Age: 69
End: 2019-02-12

## 2019-02-12 DIAGNOSIS — M79.18 MYOFASCIAL PAIN: ICD-10-CM

## 2019-02-12 DIAGNOSIS — R10.84 GENERALIZED ABDOMINAL PAIN: ICD-10-CM

## 2019-02-19 ENCOUNTER — OFFICE VISIT - HEALTHEAST (OUTPATIENT)
Dept: PHYSICAL THERAPY | Facility: REHABILITATION | Age: 69
End: 2019-02-19

## 2019-02-19 DIAGNOSIS — M79.18 MYOFASCIAL PAIN: ICD-10-CM

## 2019-02-19 DIAGNOSIS — R10.84 GENERALIZED ABDOMINAL PAIN: ICD-10-CM

## 2019-03-12 ENCOUNTER — OFFICE VISIT - HEALTHEAST (OUTPATIENT)
Dept: PHYSICAL THERAPY | Facility: REHABILITATION | Age: 69
End: 2019-03-12

## 2019-03-12 DIAGNOSIS — R10.84 GENERALIZED ABDOMINAL PAIN: ICD-10-CM

## 2019-03-12 DIAGNOSIS — M79.18 MYOFASCIAL PAIN: ICD-10-CM

## 2019-03-19 ENCOUNTER — OFFICE VISIT - HEALTHEAST (OUTPATIENT)
Dept: PHYSICAL THERAPY | Facility: REHABILITATION | Age: 69
End: 2019-03-19

## 2019-03-19 ENCOUNTER — AMBULATORY - HEALTHEAST (OUTPATIENT)
Dept: NURSING | Facility: CLINIC | Age: 69
End: 2019-03-19

## 2019-03-19 DIAGNOSIS — M79.18 MYOFASCIAL PAIN: ICD-10-CM

## 2019-03-19 DIAGNOSIS — R10.84 GENERALIZED ABDOMINAL PAIN: ICD-10-CM

## 2019-04-02 ENCOUNTER — COMMUNICATION - HEALTHEAST (OUTPATIENT)
Dept: PHARMACY | Facility: CLINIC | Age: 69
End: 2019-04-02

## 2019-04-09 ENCOUNTER — AMBULATORY - HEALTHEAST (OUTPATIENT)
Dept: PHARMACY | Facility: CLINIC | Age: 69
End: 2019-04-09

## 2019-04-09 ENCOUNTER — OFFICE VISIT - HEALTHEAST (OUTPATIENT)
Dept: PHYSICAL THERAPY | Facility: REHABILITATION | Age: 69
End: 2019-04-09

## 2019-04-09 DIAGNOSIS — Z78.9 TAKES DIETARY SUPPLEMENTS: ICD-10-CM

## 2019-04-09 DIAGNOSIS — Z79.890 HORMONE REPLACEMENT THERAPY: ICD-10-CM

## 2019-04-09 DIAGNOSIS — M79.18 MYOFASCIAL PAIN: ICD-10-CM

## 2019-04-09 DIAGNOSIS — R10.84 GENERALIZED ABDOMINAL PAIN: ICD-10-CM

## 2019-04-09 DIAGNOSIS — K58.0 IRRITABLE BOWEL SYNDROME WITH DIARRHEA: ICD-10-CM

## 2019-04-09 DIAGNOSIS — I10 BENIGN ESSENTIAL HYPERTENSION: ICD-10-CM

## 2019-04-09 DIAGNOSIS — F51.01 PRIMARY INSOMNIA: ICD-10-CM

## 2019-04-30 ENCOUNTER — OFFICE VISIT - HEALTHEAST (OUTPATIENT)
Dept: PHYSICAL THERAPY | Facility: REHABILITATION | Age: 69
End: 2019-04-30

## 2019-04-30 ENCOUNTER — AMBULATORY - HEALTHEAST (OUTPATIENT)
Dept: NURSING | Facility: CLINIC | Age: 69
End: 2019-04-30

## 2019-04-30 DIAGNOSIS — M79.18 MYOFASCIAL PAIN: ICD-10-CM

## 2019-04-30 DIAGNOSIS — R10.84 GENERALIZED ABDOMINAL PAIN: ICD-10-CM

## 2019-05-09 ENCOUNTER — OFFICE VISIT - HEALTHEAST (OUTPATIENT)
Dept: FAMILY MEDICINE | Facility: CLINIC | Age: 69
End: 2019-05-09

## 2019-05-09 DIAGNOSIS — I10 BENIGN ESSENTIAL HYPERTENSION: ICD-10-CM

## 2019-05-09 DIAGNOSIS — Z79.890 HORMONE REPLACEMENT THERAPY, POSTMENOPAUSAL: ICD-10-CM

## 2019-05-09 DIAGNOSIS — G47.09 OTHER INSOMNIA: ICD-10-CM

## 2019-05-09 DIAGNOSIS — Z79.899 CONTROLLED SUBSTANCE AGREEMENT SIGNED: ICD-10-CM

## 2019-05-09 DIAGNOSIS — K58.0 IRRITABLE BOWEL SYNDROME WITH DIARRHEA: ICD-10-CM

## 2019-05-16 ENCOUNTER — COMMUNICATION - HEALTHEAST (OUTPATIENT)
Dept: SCHEDULING | Facility: CLINIC | Age: 69
End: 2019-05-16

## 2019-05-21 ENCOUNTER — COMMUNICATION - HEALTHEAST (OUTPATIENT)
Dept: CARE COORDINATION | Facility: CLINIC | Age: 69
End: 2019-05-21

## 2019-05-21 ENCOUNTER — COMMUNICATION - HEALTHEAST (OUTPATIENT)
Dept: FAMILY MEDICINE | Facility: CLINIC | Age: 69
End: 2019-05-21

## 2019-05-23 ENCOUNTER — COMMUNICATION - HEALTHEAST (OUTPATIENT)
Dept: INFECTIOUS DISEASES | Facility: CLINIC | Age: 69
End: 2019-05-23

## 2019-05-24 ENCOUNTER — OFFICE VISIT - HEALTHEAST (OUTPATIENT)
Dept: FAMILY MEDICINE | Facility: CLINIC | Age: 69
End: 2019-05-24

## 2019-05-24 DIAGNOSIS — N89.8 VAGINAL IRRITATION: ICD-10-CM

## 2019-05-24 DIAGNOSIS — I99.8 ISCHEMIC FINGER: ICD-10-CM

## 2019-05-31 ENCOUNTER — OFFICE VISIT - HEALTHEAST (OUTPATIENT)
Dept: PHYSICAL THERAPY | Facility: REHABILITATION | Age: 69
End: 2019-05-31

## 2019-05-31 DIAGNOSIS — M79.18 MYOFASCIAL PAIN: ICD-10-CM

## 2019-05-31 DIAGNOSIS — R10.84 GENERALIZED ABDOMINAL PAIN: ICD-10-CM

## 2019-06-17 ENCOUNTER — COMMUNICATION - HEALTHEAST (OUTPATIENT)
Dept: INTERNAL MEDICINE | Facility: CLINIC | Age: 69
End: 2019-06-17

## 2019-06-17 DIAGNOSIS — E53.8 VITAMIN B12 DEFICIENCY (NON ANEMIC): ICD-10-CM

## 2019-06-18 ENCOUNTER — AMBULATORY - HEALTHEAST (OUTPATIENT)
Dept: NURSING | Facility: CLINIC | Age: 69
End: 2019-06-18

## 2019-06-18 ENCOUNTER — AMBULATORY - HEALTHEAST (OUTPATIENT)
Dept: INTERNAL MEDICINE | Facility: CLINIC | Age: 69
End: 2019-06-18

## 2019-06-18 ENCOUNTER — OFFICE VISIT - HEALTHEAST (OUTPATIENT)
Dept: PHYSICAL THERAPY | Facility: REHABILITATION | Age: 69
End: 2019-06-18

## 2019-06-18 DIAGNOSIS — E53.8 VITAMIN B12 DEFICIENCY (NON ANEMIC): ICD-10-CM

## 2019-06-18 DIAGNOSIS — M79.18 MYOFASCIAL PAIN: ICD-10-CM

## 2019-06-18 DIAGNOSIS — R10.84 GENERALIZED ABDOMINAL PAIN: ICD-10-CM

## 2019-07-30 ENCOUNTER — RECORDS - HEALTHEAST (OUTPATIENT)
Dept: ADMINISTRATIVE | Facility: OTHER | Age: 69
End: 2019-07-30

## 2019-08-01 ENCOUNTER — AMBULATORY - HEALTHEAST (OUTPATIENT)
Dept: NURSING | Facility: CLINIC | Age: 69
End: 2019-08-01

## 2019-08-01 ENCOUNTER — RECORDS - HEALTHEAST (OUTPATIENT)
Dept: ADMINISTRATIVE | Facility: OTHER | Age: 69
End: 2019-08-01

## 2019-08-13 ENCOUNTER — OFFICE VISIT - HEALTHEAST (OUTPATIENT)
Dept: INTERNAL MEDICINE | Facility: CLINIC | Age: 69
End: 2019-08-13

## 2019-08-13 DIAGNOSIS — M17.0 PRIMARY OSTEOARTHRITIS OF BOTH KNEES: ICD-10-CM

## 2019-08-13 DIAGNOSIS — Z90.710 HISTORY OF HYSTERECTOMY: ICD-10-CM

## 2019-08-13 DIAGNOSIS — E53.8 VITAMIN B12 DEFICIENCY (NON ANEMIC): ICD-10-CM

## 2019-08-13 DIAGNOSIS — R10.32 ABDOMINAL PAIN, LEFT LOWER QUADRANT: ICD-10-CM

## 2019-08-13 DIAGNOSIS — G60.8 IDIOPATHIC SMALL FIBER SENSORY NEUROPATHY: ICD-10-CM

## 2019-08-13 DIAGNOSIS — G47.00 INSOMNIA, UNSPECIFIED TYPE: ICD-10-CM

## 2019-08-19 ENCOUNTER — RECORDS - HEALTHEAST (OUTPATIENT)
Dept: ADMINISTRATIVE | Facility: OTHER | Age: 69
End: 2019-08-19

## 2019-09-06 ENCOUNTER — AMBULATORY - HEALTHEAST (OUTPATIENT)
Dept: NURSING | Facility: CLINIC | Age: 69
End: 2019-09-06

## 2019-10-21 ENCOUNTER — AMBULATORY - HEALTHEAST (OUTPATIENT)
Dept: NURSING | Facility: CLINIC | Age: 69
End: 2019-10-21

## 2019-10-23 ENCOUNTER — RECORDS - HEALTHEAST (OUTPATIENT)
Dept: ADMINISTRATIVE | Facility: OTHER | Age: 69
End: 2019-10-23

## 2019-11-12 ENCOUNTER — OFFICE VISIT - HEALTHEAST (OUTPATIENT)
Dept: INTERNAL MEDICINE | Facility: CLINIC | Age: 69
End: 2019-11-12

## 2019-11-12 ENCOUNTER — COMMUNICATION - HEALTHEAST (OUTPATIENT)
Dept: INTERNAL MEDICINE | Facility: CLINIC | Age: 69
End: 2019-11-12

## 2019-11-12 DIAGNOSIS — E53.8 VITAMIN B12 DEFICIENCY (NON ANEMIC): ICD-10-CM

## 2019-11-12 DIAGNOSIS — K58.0 IRRITABLE BOWEL SYNDROME WITH DIARRHEA: ICD-10-CM

## 2019-11-12 DIAGNOSIS — G60.8 IDIOPATHIC SMALL FIBER SENSORY NEUROPATHY: ICD-10-CM

## 2019-11-12 DIAGNOSIS — R10.32 ABDOMINAL PAIN, LEFT LOWER QUADRANT: ICD-10-CM

## 2019-11-12 DIAGNOSIS — G47.09 OTHER INSOMNIA: ICD-10-CM

## 2019-11-12 DIAGNOSIS — M54.6 CHRONIC BILATERAL THORACIC BACK PAIN: ICD-10-CM

## 2019-11-12 DIAGNOSIS — G89.29 CHRONIC BILATERAL THORACIC BACK PAIN: ICD-10-CM

## 2019-11-12 DIAGNOSIS — Z98.890 HX OF ABDOMINAL SURGERY: ICD-10-CM

## 2019-11-25 ENCOUNTER — AMBULATORY - HEALTHEAST (OUTPATIENT)
Dept: NURSING | Facility: CLINIC | Age: 69
End: 2019-11-25

## 2019-12-16 ENCOUNTER — COMMUNICATION - HEALTHEAST (OUTPATIENT)
Dept: INTERNAL MEDICINE | Facility: CLINIC | Age: 69
End: 2019-12-16

## 2019-12-16 DIAGNOSIS — Z79.890 HORMONE REPLACEMENT THERAPY, POSTMENOPAUSAL: ICD-10-CM

## 2020-01-03 ENCOUNTER — RECORDS - HEALTHEAST (OUTPATIENT)
Dept: ADMINISTRATIVE | Facility: OTHER | Age: 70
End: 2020-01-03

## 2020-01-06 ENCOUNTER — OFFICE VISIT - HEALTHEAST (OUTPATIENT)
Dept: INTERNAL MEDICINE | Facility: CLINIC | Age: 70
End: 2020-01-06

## 2020-01-06 DIAGNOSIS — Z98.890 HX OF ABDOMINAL SURGERY: ICD-10-CM

## 2020-01-06 DIAGNOSIS — E53.8 VITAMIN B12 DEFICIENCY (NON ANEMIC): ICD-10-CM

## 2020-01-06 DIAGNOSIS — G62.9 SMALL FIBER NEUROPATHY: ICD-10-CM

## 2020-01-06 DIAGNOSIS — G47.09 OTHER INSOMNIA: ICD-10-CM

## 2020-01-14 ENCOUNTER — HOSPITAL ENCOUNTER (OUTPATIENT)
Dept: MAMMOGRAPHY | Facility: CLINIC | Age: 70
Discharge: HOME | End: 2020-01-14
Attending: INTERNAL MEDICINE

## 2020-01-14 DIAGNOSIS — Z12.31 VISIT FOR SCREENING MAMMOGRAM: ICD-10-CM

## 2020-01-22 ENCOUNTER — OFFICE VISIT - HEALTHEAST (OUTPATIENT)
Dept: PHYSICAL THERAPY | Facility: REHABILITATION | Age: 70
End: 2020-01-22

## 2020-01-22 DIAGNOSIS — M25.511 PAIN IN JOINT OF RIGHT SHOULDER: ICD-10-CM

## 2020-01-22 DIAGNOSIS — M54.6 THORACIC SPINE PAIN: ICD-10-CM

## 2020-01-22 DIAGNOSIS — G44.209 TENSION HEADACHE: ICD-10-CM

## 2020-01-22 DIAGNOSIS — M79.18 MYOFASCIAL PAIN: ICD-10-CM

## 2020-01-22 DIAGNOSIS — M54.2 CERVICALGIA: ICD-10-CM

## 2020-01-27 ENCOUNTER — OFFICE VISIT - HEALTHEAST (OUTPATIENT)
Dept: PHYSICAL THERAPY | Facility: REHABILITATION | Age: 70
End: 2020-01-27

## 2020-01-27 DIAGNOSIS — G44.209 TENSION HEADACHE: ICD-10-CM

## 2020-01-27 DIAGNOSIS — M79.18 MYOFASCIAL PAIN: ICD-10-CM

## 2020-01-27 DIAGNOSIS — M25.511 PAIN IN JOINT OF RIGHT SHOULDER: ICD-10-CM

## 2020-01-27 DIAGNOSIS — M54.2 CERVICALGIA: ICD-10-CM

## 2020-01-27 DIAGNOSIS — M54.6 THORACIC SPINE PAIN: ICD-10-CM

## 2020-01-28 ENCOUNTER — COMMUNICATION - HEALTHEAST (OUTPATIENT)
Dept: INTERNAL MEDICINE | Facility: CLINIC | Age: 70
End: 2020-01-28

## 2020-01-28 ENCOUNTER — OFFICE VISIT - HEALTHEAST (OUTPATIENT)
Dept: INTERNAL MEDICINE | Facility: CLINIC | Age: 70
End: 2020-01-28

## 2020-01-28 DIAGNOSIS — G62.9 SMALL FIBER NEUROPATHY: ICD-10-CM

## 2020-01-28 DIAGNOSIS — E78.00 HYPERCHOLESTEROLEMIA: ICD-10-CM

## 2020-01-28 DIAGNOSIS — Z78.0 POSTMENOPAUSAL STATUS: ICD-10-CM

## 2020-01-28 DIAGNOSIS — G47.09 OTHER INSOMNIA: ICD-10-CM

## 2020-01-28 DIAGNOSIS — E53.8 VITAMIN B12 DEFICIENCY (NON ANEMIC): ICD-10-CM

## 2020-01-28 DIAGNOSIS — G47.00 INSOMNIA, UNSPECIFIED TYPE: ICD-10-CM

## 2020-01-28 DIAGNOSIS — Z00.00 ROUTINE GENERAL MEDICAL EXAMINATION AT A HEALTH CARE FACILITY: ICD-10-CM

## 2020-01-28 DIAGNOSIS — Z90.710 HISTORY OF HYSTERECTOMY: ICD-10-CM

## 2020-01-28 DIAGNOSIS — Z51.81 ENCOUNTER FOR THERAPEUTIC DRUG MONITORING: ICD-10-CM

## 2020-01-28 DIAGNOSIS — M17.0 PRIMARY OSTEOARTHRITIS OF BOTH KNEES: ICD-10-CM

## 2020-01-28 DIAGNOSIS — Z00.00 HEALTHCARE MAINTENANCE: ICD-10-CM

## 2020-01-28 LAB
ALBUMIN SERPL-MCNC: 3.9 G/DL (ref 3.5–5)
ALP SERPL-CCNC: 91 U/L (ref 45–120)
ALT SERPL W P-5'-P-CCNC: 12 U/L (ref 0–45)
ANION GAP SERPL CALCULATED.3IONS-SCNC: 8 MMOL/L (ref 5–18)
AST SERPL W P-5'-P-CCNC: 17 U/L (ref 0–40)
BILIRUB SERPL-MCNC: 0.5 MG/DL (ref 0–1)
BUN SERPL-MCNC: 17 MG/DL (ref 8–28)
CALCIUM SERPL-MCNC: 10.7 MG/DL (ref 8.5–10.5)
CHLORIDE BLD-SCNC: 103 MMOL/L (ref 98–107)
CHOLEST SERPL-MCNC: 273 MG/DL
CO2 SERPL-SCNC: 30 MMOL/L (ref 22–31)
CREAT SERPL-MCNC: 0.78 MG/DL (ref 0.6–1.1)
ERYTHROCYTE [DISTWIDTH] IN BLOOD BY AUTOMATED COUNT: 12.1 % (ref 11–14.5)
FASTING STATUS PATIENT QL REPORTED: YES
GFR SERPL CREATININE-BSD FRML MDRD: >60 ML/MIN/1.73M2
GLUCOSE BLD-MCNC: 87 MG/DL (ref 70–125)
HCT VFR BLD AUTO: 44.2 % (ref 35–47)
HDLC SERPL-MCNC: 82 MG/DL
HGB BLD-MCNC: 14.2 G/DL (ref 12–16)
LDLC SERPL CALC-MCNC: 169 MG/DL
MCH RBC QN AUTO: 30.9 PG (ref 27–34)
MCHC RBC AUTO-ENTMCNC: 32.1 G/DL (ref 32–36)
MCV RBC AUTO: 96 FL (ref 80–100)
PLATELET # BLD AUTO: 249 THOU/UL (ref 140–440)
PMV BLD AUTO: 7.2 FL (ref 7–10)
POTASSIUM BLD-SCNC: 4.1 MMOL/L (ref 3.5–5)
PROT SERPL-MCNC: 6.7 G/DL (ref 6–8)
RBC # BLD AUTO: 4.6 MILL/UL (ref 3.8–5.4)
SODIUM SERPL-SCNC: 141 MMOL/L (ref 136–145)
TRIGL SERPL-MCNC: 110 MG/DL
WBC: 6 THOU/UL (ref 4–11)

## 2020-01-28 ASSESSMENT — MIFFLIN-ST. JEOR: SCORE: 1236.17

## 2020-02-03 ENCOUNTER — COMMUNICATION - HEALTHEAST (OUTPATIENT)
Dept: INTERNAL MEDICINE | Facility: CLINIC | Age: 70
End: 2020-02-03

## 2020-02-03 DIAGNOSIS — E78.00 HYPERCHOLESTEROLEMIA: ICD-10-CM

## 2020-02-05 ENCOUNTER — OFFICE VISIT - HEALTHEAST (OUTPATIENT)
Dept: PHYSICAL THERAPY | Facility: REHABILITATION | Age: 70
End: 2020-02-05

## 2020-02-05 DIAGNOSIS — M54.6 THORACIC SPINE PAIN: ICD-10-CM

## 2020-02-05 DIAGNOSIS — M54.2 CERVICALGIA: ICD-10-CM

## 2020-02-05 DIAGNOSIS — G44.209 TENSION HEADACHE: ICD-10-CM

## 2020-02-05 DIAGNOSIS — M25.511 PAIN IN JOINT OF RIGHT SHOULDER: ICD-10-CM

## 2020-02-05 DIAGNOSIS — M79.18 MYOFASCIAL PAIN: ICD-10-CM

## 2020-02-24 ENCOUNTER — AMBULATORY - HEALTHEAST (OUTPATIENT)
Dept: NURSING | Facility: CLINIC | Age: 70
End: 2020-02-24

## 2020-02-24 ENCOUNTER — RECORDS - HEALTHEAST (OUTPATIENT)
Dept: ADMINISTRATIVE | Facility: OTHER | Age: 70
End: 2020-02-24

## 2020-02-24 ENCOUNTER — RECORDS - HEALTHEAST (OUTPATIENT)
Dept: BONE DENSITY | Facility: CLINIC | Age: 70
End: 2020-02-24

## 2020-02-24 ENCOUNTER — COMMUNICATION - HEALTHEAST (OUTPATIENT)
Dept: INTERNAL MEDICINE | Facility: CLINIC | Age: 70
End: 2020-02-24

## 2020-02-24 DIAGNOSIS — E53.8 VITAMIN B12 DEFICIENCY (NON ANEMIC): ICD-10-CM

## 2020-02-24 DIAGNOSIS — Z78.0 ASYMPTOMATIC MENOPAUSAL STATE: ICD-10-CM

## 2020-02-26 ENCOUNTER — COMMUNICATION - HEALTHEAST (OUTPATIENT)
Dept: PHARMACY | Facility: CLINIC | Age: 70
End: 2020-02-26

## 2020-02-27 ENCOUNTER — COMMUNICATION - HEALTHEAST (OUTPATIENT)
Dept: INTERNAL MEDICINE | Facility: CLINIC | Age: 70
End: 2020-02-27

## 2020-02-28 ENCOUNTER — HOSPITAL ENCOUNTER (OUTPATIENT)
Dept: CT IMAGING | Facility: CLINIC | Age: 70
Discharge: HOME | End: 2020-02-28
Attending: INTERNAL MEDICINE

## 2020-02-28 DIAGNOSIS — E78.00 HYPERCHOLESTEROLEMIA: ICD-10-CM

## 2020-02-28 LAB
CV CALCIUM SCORE AGATSTON LM: 9
CV CALCIUM SCORING AGATSON LAD: 195
CV CALCIUM SCORING AGATSTON CX: 0
CV CALCIUM SCORING AGATSTON RCA: 29
CV CALCIUM SCORING AGATSTON TOTAL: 233

## 2020-03-02 ENCOUNTER — COMMUNICATION - HEALTHEAST (OUTPATIENT)
Dept: INTERNAL MEDICINE | Facility: CLINIC | Age: 70
End: 2020-03-02

## 2020-03-04 ENCOUNTER — AMBULATORY - HEALTHEAST (OUTPATIENT)
Dept: PHARMACY | Facility: CLINIC | Age: 70
End: 2020-03-04

## 2020-03-04 DIAGNOSIS — Z78.9 TAKES DIETARY SUPPLEMENTS: ICD-10-CM

## 2020-03-04 DIAGNOSIS — G47.00 INSOMNIA, UNSPECIFIED TYPE: ICD-10-CM

## 2020-03-04 DIAGNOSIS — Z79.890 HORMONE REPLACEMENT THERAPY, POSTMENOPAUSAL: ICD-10-CM

## 2020-03-04 DIAGNOSIS — E78.5 HYPERLIPIDEMIA, UNSPECIFIED HYPERLIPIDEMIA TYPE: ICD-10-CM

## 2020-03-04 DIAGNOSIS — K58.0 IRRITABLE BOWEL SYNDROME WITH DIARRHEA: ICD-10-CM

## 2020-03-13 ENCOUNTER — OFFICE VISIT - HEALTHEAST (OUTPATIENT)
Dept: PHYSICAL THERAPY | Facility: REHABILITATION | Age: 70
End: 2020-03-13

## 2020-03-13 DIAGNOSIS — M54.2 CERVICALGIA: ICD-10-CM

## 2020-03-13 DIAGNOSIS — M54.6 THORACIC SPINE PAIN: ICD-10-CM

## 2020-03-13 DIAGNOSIS — M25.511 PAIN IN JOINT OF RIGHT SHOULDER: ICD-10-CM

## 2020-03-13 DIAGNOSIS — G44.209 TENSION HEADACHE: ICD-10-CM

## 2020-03-13 DIAGNOSIS — M79.18 MYOFASCIAL PAIN: ICD-10-CM

## 2020-03-16 ENCOUNTER — COMMUNICATION - HEALTHEAST (OUTPATIENT)
Dept: INTERNAL MEDICINE | Facility: CLINIC | Age: 70
End: 2020-03-16

## 2020-03-17 ENCOUNTER — COMMUNICATION - HEALTHEAST (OUTPATIENT)
Dept: INTERNAL MEDICINE | Facility: CLINIC | Age: 70
End: 2020-03-17

## 2020-03-17 DIAGNOSIS — Z79.890 HORMONE REPLACEMENT THERAPY, POSTMENOPAUSAL: ICD-10-CM

## 2020-05-14 ENCOUNTER — COMMUNICATION - HEALTHEAST (OUTPATIENT)
Dept: INTERNAL MEDICINE | Facility: CLINIC | Age: 70
End: 2020-05-14

## 2020-05-14 DIAGNOSIS — Z23 NEED FOR TD VACCINE: ICD-10-CM

## 2020-05-18 ENCOUNTER — AMBULATORY - HEALTHEAST (OUTPATIENT)
Dept: NURSING | Facility: CLINIC | Age: 70
End: 2020-05-18

## 2020-05-18 DIAGNOSIS — Z23 NEED FOR TD VACCINE: ICD-10-CM

## 2020-06-09 ENCOUNTER — RECORDS - HEALTHEAST (OUTPATIENT)
Dept: ADMINISTRATIVE | Facility: OTHER | Age: 70
End: 2020-06-09

## 2020-07-08 ENCOUNTER — OFFICE VISIT - HEALTHEAST (OUTPATIENT)
Dept: INTERNAL MEDICINE | Facility: CLINIC | Age: 70
End: 2020-07-08

## 2020-07-08 DIAGNOSIS — Z51.81 ENCOUNTER FOR THERAPEUTIC DRUG MONITORING: ICD-10-CM

## 2020-07-08 DIAGNOSIS — R93.1 AGATSTON CORONARY ARTERY CALCIUM SCORE BETWEEN 200 AND 399: ICD-10-CM

## 2020-07-08 DIAGNOSIS — E53.8 VITAMIN B12 DEFICIENCY (NON ANEMIC): ICD-10-CM

## 2020-07-08 DIAGNOSIS — R10.32 ABDOMINAL PAIN, LEFT LOWER QUADRANT: ICD-10-CM

## 2020-07-08 DIAGNOSIS — I77.89 ASCENDING AORTA ENLARGEMENT (H): ICD-10-CM

## 2020-07-08 DIAGNOSIS — E78.00 HYPERCHOLESTEROLEMIA: ICD-10-CM

## 2020-07-08 DIAGNOSIS — M81.0 SENILE OSTEOPOROSIS: ICD-10-CM

## 2020-07-08 LAB
ALBUMIN SERPL-MCNC: 3.8 G/DL (ref 3.5–5)
ALP SERPL-CCNC: 90 U/L (ref 45–120)
ALT SERPL W P-5'-P-CCNC: 14 U/L (ref 0–45)
ANION GAP SERPL CALCULATED.3IONS-SCNC: 8 MMOL/L (ref 5–18)
AST SERPL W P-5'-P-CCNC: 14 U/L (ref 0–40)
BILIRUB SERPL-MCNC: 0.5 MG/DL (ref 0–1)
BUN SERPL-MCNC: 15 MG/DL (ref 8–28)
CALCIUM SERPL-MCNC: 10.6 MG/DL (ref 8.5–10.5)
CHLORIDE BLD-SCNC: 106 MMOL/L (ref 98–107)
CHOLEST SERPL-MCNC: 269 MG/DL
CK SERPL-CCNC: 40 U/L (ref 30–190)
CO2 SERPL-SCNC: 27 MMOL/L (ref 22–31)
CREAT SERPL-MCNC: 0.79 MG/DL (ref 0.6–1.1)
ERYTHROCYTE [DISTWIDTH] IN BLOOD BY AUTOMATED COUNT: 12.9 % (ref 11–14.5)
FASTING STATUS PATIENT QL REPORTED: YES
GFR SERPL CREATININE-BSD FRML MDRD: >60 ML/MIN/1.73M2
GLUCOSE BLD-MCNC: 84 MG/DL (ref 70–125)
HCT VFR BLD AUTO: 42.3 % (ref 35–47)
HDLC SERPL-MCNC: 72 MG/DL
HGB BLD-MCNC: 14.3 G/DL (ref 12–16)
LDLC SERPL CALC-MCNC: 175 MG/DL
MCH RBC QN AUTO: 31.9 PG (ref 27–34)
MCHC RBC AUTO-ENTMCNC: 33.8 G/DL (ref 32–36)
MCV RBC AUTO: 94 FL (ref 80–100)
PLATELET # BLD AUTO: 244 THOU/UL (ref 140–440)
PMV BLD AUTO: 7 FL (ref 7–10)
POTASSIUM BLD-SCNC: 4.3 MMOL/L (ref 3.5–5)
PROT SERPL-MCNC: 6.6 G/DL (ref 6–8)
RBC # BLD AUTO: 4.47 MILL/UL (ref 3.8–5.4)
SODIUM SERPL-SCNC: 141 MMOL/L (ref 136–145)
TRIGL SERPL-MCNC: 112 MG/DL
WBC: 6 THOU/UL (ref 4–11)

## 2020-07-09 ENCOUNTER — COMMUNICATION - HEALTHEAST (OUTPATIENT)
Dept: INTERNAL MEDICINE | Facility: CLINIC | Age: 70
End: 2020-07-09

## 2020-07-15 ENCOUNTER — HOSPITAL ENCOUNTER (OUTPATIENT)
Dept: CT IMAGING | Facility: CLINIC | Age: 70
Discharge: HOME | End: 2020-07-15
Attending: INTERNAL MEDICINE

## 2020-07-15 DIAGNOSIS — R10.32 ABDOMINAL PAIN, LEFT LOWER QUADRANT: ICD-10-CM

## 2020-07-15 DIAGNOSIS — I77.89 ASCENDING AORTA ENLARGEMENT (H): ICD-10-CM

## 2020-07-20 ENCOUNTER — COMMUNICATION - HEALTHEAST (OUTPATIENT)
Dept: INTERNAL MEDICINE | Facility: CLINIC | Age: 70
End: 2020-07-20

## 2020-08-05 ENCOUNTER — COMMUNICATION - HEALTHEAST (OUTPATIENT)
Dept: INTERNAL MEDICINE | Facility: CLINIC | Age: 70
End: 2020-08-05

## 2020-09-02 ENCOUNTER — OFFICE VISIT - HEALTHEAST (OUTPATIENT)
Dept: INTERNAL MEDICINE | Facility: CLINIC | Age: 70
End: 2020-09-02

## 2020-09-02 DIAGNOSIS — R93.1 AGATSTON CORONARY ARTERY CALCIUM SCORE BETWEEN 200 AND 399: ICD-10-CM

## 2020-09-02 DIAGNOSIS — Z51.81 ENCOUNTER FOR THERAPEUTIC DRUG MONITORING: ICD-10-CM

## 2020-09-02 DIAGNOSIS — E78.00 HYPERCHOLESTEROLEMIA: ICD-10-CM

## 2020-09-02 DIAGNOSIS — K57.30 DIVERTICULOSIS OF LARGE INTESTINE WITHOUT HEMORRHAGE: ICD-10-CM

## 2020-09-02 DIAGNOSIS — G89.29 CHRONIC BILATERAL LOW BACK PAIN WITHOUT SCIATICA: ICD-10-CM

## 2020-09-02 DIAGNOSIS — M54.50 CHRONIC BILATERAL LOW BACK PAIN WITHOUT SCIATICA: ICD-10-CM

## 2020-09-02 DIAGNOSIS — E53.8 VITAMIN B12 DEFICIENCY (NON ANEMIC): ICD-10-CM

## 2020-09-02 DIAGNOSIS — G62.9 SMALL FIBER NEUROPATHY: ICD-10-CM

## 2020-09-02 DIAGNOSIS — I77.89 ASCENDING AORTA ENLARGEMENT (H): ICD-10-CM

## 2020-09-02 DIAGNOSIS — M81.0 SENILE OSTEOPOROSIS: ICD-10-CM

## 2020-09-02 LAB
ALBUMIN SERPL-MCNC: 4.1 G/DL (ref 3.5–5)
ALP SERPL-CCNC: 95 U/L (ref 45–120)
ALT SERPL W P-5'-P-CCNC: 13 U/L (ref 0–45)
ANION GAP SERPL CALCULATED.3IONS-SCNC: 10 MMOL/L (ref 5–18)
AST SERPL W P-5'-P-CCNC: 17 U/L (ref 0–40)
BILIRUB SERPL-MCNC: 0.5 MG/DL (ref 0–1)
BUN SERPL-MCNC: 16 MG/DL (ref 8–28)
CALCIUM SERPL-MCNC: 10.5 MG/DL (ref 8.5–10.5)
CHLORIDE BLD-SCNC: 105 MMOL/L (ref 98–107)
CHOLEST SERPL-MCNC: 209 MG/DL
CK SERPL-CCNC: 44 U/L (ref 30–190)
CO2 SERPL-SCNC: 27 MMOL/L (ref 22–31)
CREAT SERPL-MCNC: 0.77 MG/DL (ref 0.6–1.1)
FASTING STATUS PATIENT QL REPORTED: YES
GFR SERPL CREATININE-BSD FRML MDRD: >60 ML/MIN/1.73M2
GLUCOSE BLD-MCNC: 85 MG/DL (ref 70–125)
HDLC SERPL-MCNC: 64 MG/DL
LDLC SERPL CALC-MCNC: 112 MG/DL
POTASSIUM BLD-SCNC: 4.3 MMOL/L (ref 3.5–5)
PROT SERPL-MCNC: 7.2 G/DL (ref 6–8)
SODIUM SERPL-SCNC: 142 MMOL/L (ref 136–145)
TRIGL SERPL-MCNC: 164 MG/DL

## 2020-09-03 ENCOUNTER — COMMUNICATION - HEALTHEAST (OUTPATIENT)
Dept: INTERNAL MEDICINE | Facility: CLINIC | Age: 70
End: 2020-09-03

## 2020-09-03 DIAGNOSIS — G47.09 OTHER INSOMNIA: ICD-10-CM

## 2020-09-09 ENCOUNTER — COMMUNICATION - HEALTHEAST (OUTPATIENT)
Dept: INTERNAL MEDICINE | Facility: CLINIC | Age: 70
End: 2020-09-09

## 2020-09-16 ENCOUNTER — RECORDS - HEALTHEAST (OUTPATIENT)
Dept: ADMINISTRATIVE | Facility: OTHER | Age: 70
End: 2020-09-16

## 2020-10-02 ENCOUNTER — OFFICE VISIT - HEALTHEAST (OUTPATIENT)
Dept: PHYSICAL THERAPY | Facility: REHABILITATION | Age: 70
End: 2020-10-02

## 2020-10-02 ENCOUNTER — AMBULATORY - HEALTHEAST (OUTPATIENT)
Dept: NURSING | Facility: CLINIC | Age: 70
End: 2020-10-02

## 2020-10-02 DIAGNOSIS — M79.18 MYOFASCIAL PAIN: ICD-10-CM

## 2020-10-02 DIAGNOSIS — M54.50 CHRONIC BILATERAL LOW BACK PAIN WITHOUT SCIATICA: ICD-10-CM

## 2020-10-02 DIAGNOSIS — G89.29 CHRONIC BILATERAL LOW BACK PAIN WITHOUT SCIATICA: ICD-10-CM

## 2020-11-04 ENCOUNTER — RECORDS - HEALTHEAST (OUTPATIENT)
Dept: ADMINISTRATIVE | Facility: OTHER | Age: 70
End: 2020-11-04

## 2020-11-16 ENCOUNTER — AMBULATORY - HEALTHEAST (OUTPATIENT)
Dept: NURSING | Facility: CLINIC | Age: 70
End: 2020-11-16

## 2020-12-15 ENCOUNTER — AMBULATORY - HEALTHEAST (OUTPATIENT)
Dept: NURSING | Facility: CLINIC | Age: 70
End: 2020-12-15

## 2021-01-01 ENCOUNTER — HOSPITAL ENCOUNTER (OUTPATIENT)
Dept: PHYSICAL THERAPY | Facility: REHABILITATION | Age: 71
End: 2021-08-23
Payer: COMMERCIAL

## 2021-01-01 ENCOUNTER — HOSPITAL ENCOUNTER (OUTPATIENT)
Dept: PHYSICAL THERAPY | Facility: REHABILITATION | Age: 71
End: 2021-11-01
Payer: COMMERCIAL

## 2021-01-01 ENCOUNTER — HOSPITAL ENCOUNTER (OUTPATIENT)
Dept: PHYSICAL THERAPY | Facility: REHABILITATION | Age: 71
End: 2021-10-18
Payer: COMMERCIAL

## 2021-01-01 ENCOUNTER — HOSPITAL ENCOUNTER (OUTPATIENT)
Dept: PHYSICAL THERAPY | Facility: REHABILITATION | Age: 71
End: 2021-07-23
Payer: COMMERCIAL

## 2021-01-01 ENCOUNTER — RECORDS - HEALTHEAST (OUTPATIENT)
Dept: ADMINISTRATIVE | Facility: CLINIC | Age: 71
End: 2021-01-01

## 2021-01-01 ENCOUNTER — IMMUNIZATION (OUTPATIENT)
Dept: NURSING | Facility: CLINIC | Age: 71
End: 2021-01-01
Payer: COMMERCIAL

## 2021-01-01 ENCOUNTER — HOSPITAL ENCOUNTER (OUTPATIENT)
Dept: PHYSICAL THERAPY | Facility: REHABILITATION | Age: 71
End: 2021-07-30
Payer: COMMERCIAL

## 2021-01-01 ENCOUNTER — OFFICE VISIT - HEALTHEAST (OUTPATIENT)
Dept: PHYSICAL THERAPY | Facility: REHABILITATION | Age: 71
End: 2021-01-01

## 2021-01-01 ENCOUNTER — HOSPITAL ENCOUNTER (OUTPATIENT)
Dept: CT IMAGING | Facility: CLINIC | Age: 71
Discharge: HOME | End: 2021-07-16
Attending: INTERNAL MEDICINE | Admitting: INTERNAL MEDICINE
Payer: COMMERCIAL

## 2021-01-01 ENCOUNTER — COMMUNICATION - HEALTHEAST (OUTPATIENT)
Dept: INTERNAL MEDICINE | Facility: CLINIC | Age: 71
End: 2021-01-01

## 2021-01-01 ENCOUNTER — HOSPITAL ENCOUNTER (OUTPATIENT)
Dept: PHYSICAL THERAPY | Facility: REHABILITATION | Age: 71
End: 2021-10-04
Payer: COMMERCIAL

## 2021-01-01 ENCOUNTER — OFFICE VISIT - HEALTHEAST (OUTPATIENT)
Dept: INTERNAL MEDICINE | Facility: CLINIC | Age: 71
End: 2021-01-01

## 2021-01-01 ENCOUNTER — TELEPHONE (OUTPATIENT)
Dept: INTERNAL MEDICINE | Facility: CLINIC | Age: 71
End: 2021-01-01
Payer: COMMERCIAL

## 2021-01-01 ENCOUNTER — HOSPITAL ENCOUNTER (OUTPATIENT)
Dept: PHYSICAL THERAPY | Facility: REHABILITATION | Age: 71
End: 2021-12-13
Payer: COMMERCIAL

## 2021-01-01 ENCOUNTER — HOSPITAL ENCOUNTER (OUTPATIENT)
Dept: PHYSICAL THERAPY | Facility: REHABILITATION | Age: 71
End: 2021-08-10
Payer: COMMERCIAL

## 2021-01-01 ENCOUNTER — TRANSFERRED RECORDS (OUTPATIENT)
Dept: HEALTH INFORMATION MANAGEMENT | Facility: CLINIC | Age: 71
End: 2021-01-01

## 2021-01-01 ENCOUNTER — TRANSFERRED RECORDS (OUTPATIENT)
Dept: HEALTH INFORMATION MANAGEMENT | Facility: CLINIC | Age: 71
End: 2021-01-01
Payer: COMMERCIAL

## 2021-01-01 ENCOUNTER — HOSPITAL ENCOUNTER (OUTPATIENT)
Dept: PHYSICAL THERAPY | Facility: REHABILITATION | Age: 71
End: 2021-09-07
Payer: COMMERCIAL

## 2021-01-01 ENCOUNTER — OFFICE VISIT (OUTPATIENT)
Dept: INTERNAL MEDICINE | Facility: CLINIC | Age: 71
End: 2021-01-01
Payer: COMMERCIAL

## 2021-01-01 ENCOUNTER — HOSPITAL ENCOUNTER (OUTPATIENT)
Dept: PHYSICAL THERAPY | Facility: REHABILITATION | Age: 71
End: 2021-11-15
Payer: COMMERCIAL

## 2021-01-01 ENCOUNTER — HEALTH MAINTENANCE LETTER (OUTPATIENT)
Age: 71
End: 2021-01-01

## 2021-01-01 ENCOUNTER — HOSPITAL ENCOUNTER (OUTPATIENT)
Dept: PHYSICAL THERAPY | Facility: REHABILITATION | Age: 71
End: 2021-09-20
Payer: COMMERCIAL

## 2021-01-01 VITALS
WEIGHT: 157 LBS | BODY MASS INDEX: 23.87 KG/M2 | HEART RATE: 80 BPM | SYSTOLIC BLOOD PRESSURE: 128 MMHG | DIASTOLIC BLOOD PRESSURE: 80 MMHG

## 2021-01-01 VITALS
HEIGHT: 67 IN | OXYGEN SATURATION: 98 % | BODY MASS INDEX: 24.16 KG/M2 | SYSTOLIC BLOOD PRESSURE: 104 MMHG | HEART RATE: 94 BPM | DIASTOLIC BLOOD PRESSURE: 80 MMHG | WEIGHT: 153.9 LBS

## 2021-01-01 VITALS
WEIGHT: 159.9 LBS | HEIGHT: 66 IN | TEMPERATURE: 96.1 F | SYSTOLIC BLOOD PRESSURE: 114 MMHG | DIASTOLIC BLOOD PRESSURE: 86 MMHG | HEART RATE: 72 BPM | BODY MASS INDEX: 25.7 KG/M2

## 2021-01-01 VITALS
SYSTOLIC BLOOD PRESSURE: 124 MMHG | WEIGHT: 152 LBS | BODY MASS INDEX: 23.81 KG/M2 | HEART RATE: 84 BPM | DIASTOLIC BLOOD PRESSURE: 80 MMHG

## 2021-01-01 VITALS — WEIGHT: 152.6 LBS | BODY MASS INDEX: 23.95 KG/M2 | HEIGHT: 67 IN

## 2021-01-01 VITALS — HEIGHT: 67 IN | BODY MASS INDEX: 24.17 KG/M2 | WEIGHT: 154 LBS

## 2021-01-01 VITALS
HEART RATE: 72 BPM | DIASTOLIC BLOOD PRESSURE: 80 MMHG | BODY MASS INDEX: 24.17 KG/M2 | SYSTOLIC BLOOD PRESSURE: 110 MMHG | HEIGHT: 67 IN | WEIGHT: 154 LBS

## 2021-01-01 VITALS
SYSTOLIC BLOOD PRESSURE: 118 MMHG | DIASTOLIC BLOOD PRESSURE: 80 MMHG | BODY MASS INDEX: 23.26 KG/M2 | HEART RATE: 76 BPM | WEIGHT: 153 LBS

## 2021-01-01 VITALS — WEIGHT: 155.8 LBS | BODY MASS INDEX: 24.58 KG/M2

## 2021-01-01 VITALS — WEIGHT: 157.1 LBS | HEIGHT: 67 IN | BODY MASS INDEX: 24.66 KG/M2

## 2021-01-01 VITALS
BODY MASS INDEX: 23.63 KG/M2 | WEIGHT: 148.8 LBS | HEART RATE: 70 BPM | SYSTOLIC BLOOD PRESSURE: 118 MMHG | DIASTOLIC BLOOD PRESSURE: 82 MMHG | OXYGEN SATURATION: 98 %

## 2021-01-01 VITALS
DIASTOLIC BLOOD PRESSURE: 82 MMHG | WEIGHT: 156 LBS | TEMPERATURE: 96.8 F | HEART RATE: 68 BPM | SYSTOLIC BLOOD PRESSURE: 128 MMHG | BODY MASS INDEX: 24.43 KG/M2

## 2021-01-01 VITALS — BODY MASS INDEX: 23.49 KG/M2 | WEIGHT: 154.5 LBS

## 2021-01-01 VITALS — BODY MASS INDEX: 24.68 KG/M2 | WEIGHT: 156.4 LBS

## 2021-01-01 VITALS
OXYGEN SATURATION: 96 % | TEMPERATURE: 98.3 F | DIASTOLIC BLOOD PRESSURE: 84 MMHG | SYSTOLIC BLOOD PRESSURE: 110 MMHG | HEART RATE: 84 BPM

## 2021-01-01 VITALS — WEIGHT: 156.3 LBS | BODY MASS INDEX: 23.77 KG/M2

## 2021-01-01 DIAGNOSIS — G89.29 CHRONIC BILATERAL LOW BACK PAIN WITHOUT SCIATICA: ICD-10-CM

## 2021-01-01 DIAGNOSIS — M25.561 CHRONIC PAIN OF BOTH KNEES: ICD-10-CM

## 2021-01-01 DIAGNOSIS — M25.571 PAIN IN JOINT INVOLVING RIGHT ANKLE AND FOOT: ICD-10-CM

## 2021-01-01 DIAGNOSIS — G89.29 CHRONIC PAIN OF BOTH KNEES: ICD-10-CM

## 2021-01-01 DIAGNOSIS — M54.50 CHRONIC BILATERAL LOW BACK PAIN WITHOUT SCIATICA: Primary | ICD-10-CM

## 2021-01-01 DIAGNOSIS — M54.50 CHRONIC BILATERAL LOW BACK PAIN WITHOUT SCIATICA: ICD-10-CM

## 2021-01-01 DIAGNOSIS — M79.18 MYOFASCIAL PAIN: ICD-10-CM

## 2021-01-01 DIAGNOSIS — M25.562 CHRONIC PAIN OF BOTH KNEES: ICD-10-CM

## 2021-01-01 DIAGNOSIS — G89.29 CHRONIC BILATERAL LOW BACK PAIN WITHOUT SCIATICA: Primary | ICD-10-CM

## 2021-01-01 DIAGNOSIS — I77.810 ASCENDING AORTA DILATATION (H): ICD-10-CM

## 2021-01-01 DIAGNOSIS — H92.01 OTALGIA, RIGHT: ICD-10-CM

## 2021-01-01 DIAGNOSIS — M81.0 AGE-RELATED OSTEOPOROSIS WITHOUT CURRENT PATHOLOGICAL FRACTURE: ICD-10-CM

## 2021-01-01 DIAGNOSIS — F51.01 PRIMARY INSOMNIA: Primary | ICD-10-CM

## 2021-01-01 DIAGNOSIS — F51.01 PRIMARY INSOMNIA: ICD-10-CM

## 2021-01-01 DIAGNOSIS — R93.89 ABNORMAL FINDINGS ON DIAGNOSTIC IMAGING OF OTHER SPECIFIED BODY STRUCTURES: ICD-10-CM

## 2021-01-01 DIAGNOSIS — Z79.890 HORMONE REPLACEMENT THERAPY, POSTMENOPAUSAL: Primary | ICD-10-CM

## 2021-01-01 DIAGNOSIS — Z79.890 HORMONE REPLACEMENT THERAPY, POSTMENOPAUSAL: ICD-10-CM

## 2021-01-01 DIAGNOSIS — I77.810 ASCENDING AORTA DILATATION (H): Primary | ICD-10-CM

## 2021-01-01 LAB
25(OH)D3 SERPL-MCNC: 68.4 NG/ML (ref 30–80)
25(OH)D3 SERPL-MCNC: 68.4 NG/ML (ref 30–80)
ALBUMIN PERCENT: 64 % (ref 51–67)
ALBUMIN SERPL ELPH-MCNC: 4.2 G/DL (ref 3.2–4.7)
ALPHA 1 PERCENT: 2.8 % (ref 2–4)
ALPHA 2 PERCENT: 11.9 % (ref 5–13)
ALPHA1 GLOB SERPL ELPH-MCNC: 0.2 G/DL (ref 0.1–0.3)
ALPHA2 GLOB SERPL ELPH-MCNC: 0.8 G/DL (ref 0.4–0.9)
B-GLOBULIN SERPL ELPH-MCNC: 0.7 G/DL (ref 0.7–1.2)
BETA PERCENT: 10 % (ref 10–17)
CALCIUM SERPL-MCNC: 10.5 MG/DL (ref 8.5–10.5)
CREAT BLD-MCNC: 0.7 MG/DL (ref 0.6–1.1)
CREAT SERPL-MCNC: 0.81 MG/DL (ref 0.6–1.1)
GAMMA GLOB SERPL ELPH-MCNC: 0.7 G/DL (ref 0.6–1.4)
GAMMA GLOBULIN PERCENT: 11.3 % (ref 9–20)
GFR SERPL CREATININE-BSD FRML MDRD: >60 ML/MIN/1.73M2
GFR SERPL CREATININE-BSD FRML MDRD: >60 ML/MIN/1.73M2
GLIADIN IGA SER-ACNC: 0.1 U/ML
GLIADIN IGG SER-ACNC: <0.4 U/ML
IGA SERPL-MCNC: 63 MG/DL (ref 65–400)
PATH ICD:: NORMAL
PHOSPHATE SERPL-MCNC: 3.1 MG/DL (ref 2.5–4.5)
PROT PATTERN SERPL ELPH-IMP: NORMAL
PROT SERPL-MCNC: 6.6 G/DL (ref 6–8)
PTH-INTACT SERPL-MCNC: 83 PG/ML (ref 10–86)
REVIEWING PATHOLOGIST: NORMAL
TSH SERPL DL<=0.005 MIU/L-ACNC: 1.99 UIU/ML (ref 0.3–5)
TTG IGA SER-ACNC: <0.1 U/ML
TTG IGG SER-ACNC: <0.6 U/ML

## 2021-01-01 PROCEDURE — 97110 THERAPEUTIC EXERCISES: CPT | Mod: GP | Performed by: PHYSICAL THERAPIST

## 2021-01-01 PROCEDURE — 97112 NEUROMUSCULAR REEDUCATION: CPT | Mod: GP | Performed by: PHYSICAL THERAPIST

## 2021-01-01 PROCEDURE — 97140 MANUAL THERAPY 1/> REGIONS: CPT | Mod: GP | Performed by: PHYSICAL THERAPIST

## 2021-01-01 PROCEDURE — 91301 PR COVID VAC MODERNA 100 MCG/0.5 ML IM: CPT

## 2021-01-01 PROCEDURE — 99214 OFFICE O/P EST MOD 30 MIN: CPT | Performed by: INTERNAL MEDICINE

## 2021-01-01 PROCEDURE — 0064A PR COVID VAC MODERNA 100 MCG/0.5 ML IM: CPT

## 2021-01-01 PROCEDURE — 82565 ASSAY OF CREATININE: CPT

## 2021-01-01 PROCEDURE — 71275 CT ANGIOGRAPHY CHEST: CPT

## 2021-01-01 PROCEDURE — 250N000011 HC RX IP 250 OP 636: Performed by: INTERNAL MEDICINE

## 2021-01-01 RX ORDER — TRAZODONE HYDROCHLORIDE 50 MG/1
TABLET, FILM COATED ORAL
Qty: 30 TABLET | Refills: 0 | Status: SHIPPED | OUTPATIENT
Start: 2021-01-01 | End: 2022-01-01

## 2021-01-01 RX ORDER — FAMOTIDINE 20 MG/1
20 TABLET, FILM COATED ORAL DAILY PRN
COMMUNITY

## 2021-01-01 RX ORDER — ALENDRONATE SODIUM 70 MG/1
70 TABLET ORAL WEEKLY
COMMUNITY
Start: 2021-01-01 | End: 2022-01-01

## 2021-01-01 RX ORDER — CYCLOSPORINE 0.5 MG/ML
EMULSION OPHTHALMIC
COMMUNITY
Start: 2021-01-01

## 2021-01-01 RX ORDER — IOPAMIDOL 755 MG/ML
100 INJECTION, SOLUTION INTRAVASCULAR ONCE
Status: COMPLETED | OUTPATIENT
Start: 2021-01-01 | End: 2021-01-01

## 2021-01-01 RX ORDER — CONJUGATED ESTROGENS 0.62 MG/1
TABLET, FILM COATED ORAL
Qty: 90 TABLET | Refills: 3 | Status: SHIPPED | OUTPATIENT
Start: 2021-01-01

## 2021-01-01 RX ORDER — TRAZODONE HYDROCHLORIDE 50 MG/1
TABLET, FILM COATED ORAL
COMMUNITY
Start: 2021-01-01 | End: 2021-01-01

## 2021-01-01 RX ORDER — ATORVASTATIN CALCIUM 10 MG/1
10 TABLET, FILM COATED ORAL DAILY
COMMUNITY
Start: 2021-01-07 | End: 2022-01-01

## 2021-01-01 RX ORDER — ERYTHROMYCIN 5 MG/G
OINTMENT OPHTHALMIC
COMMUNITY
Start: 2021-01-01

## 2021-01-01 RX ADMIN — IOPAMIDOL 100 ML: 755 INJECTION, SOLUTION INTRAVENOUS at 10:10

## 2021-01-01 ASSESSMENT — ANXIETY QUESTIONNAIRES: GAD7 TOTAL SCORE: 8

## 2021-01-01 ASSESSMENT — PATIENT HEALTH QUESTIONNAIRE - PHQ9: SUM OF ALL RESPONSES TO PHQ QUESTIONS 1-9: 7

## 2021-01-01 ASSESSMENT — MIFFLIN-ST. JEOR: SCORE: 1228.33

## 2021-01-22 ENCOUNTER — OFFICE VISIT - HEALTHEAST (OUTPATIENT)
Dept: INTERNAL MEDICINE | Facility: CLINIC | Age: 71
End: 2021-01-22

## 2021-01-22 DIAGNOSIS — M81.0 AGE-RELATED OSTEOPOROSIS WITHOUT CURRENT PATHOLOGICAL FRACTURE: ICD-10-CM

## 2021-01-22 DIAGNOSIS — Z79.890 HORMONE REPLACEMENT THERAPY, POSTMENOPAUSAL: ICD-10-CM

## 2021-01-22 DIAGNOSIS — E78.5 HYPERLIPIDEMIA LDL GOAL <70: ICD-10-CM

## 2021-01-22 DIAGNOSIS — I10 BENIGN ESSENTIAL HYPERTENSION: ICD-10-CM

## 2021-01-22 DIAGNOSIS — I77.810 ASCENDING AORTA DILATATION (H): ICD-10-CM

## 2021-01-22 DIAGNOSIS — R10.32 ABDOMINAL PAIN, CHRONIC, BILATERAL LOWER QUADRANT: ICD-10-CM

## 2021-01-22 DIAGNOSIS — G47.00 INSOMNIA, UNSPECIFIED TYPE: ICD-10-CM

## 2021-01-22 DIAGNOSIS — G89.29 ABDOMINAL PAIN, CHRONIC, BILATERAL LOWER QUADRANT: ICD-10-CM

## 2021-01-22 DIAGNOSIS — R10.31 ABDOMINAL PAIN, CHRONIC, BILATERAL LOWER QUADRANT: ICD-10-CM

## 2021-01-22 DIAGNOSIS — I25.10 CORONARY ARTERY CALCIFICATION SEEN ON CAT SCAN: ICD-10-CM

## 2021-01-22 DIAGNOSIS — K58.0 IRRITABLE BOWEL SYNDROME WITH DIARRHEA: ICD-10-CM

## 2021-01-22 DIAGNOSIS — G62.9 SMALL FIBER NEUROPATHY: ICD-10-CM

## 2021-01-22 DIAGNOSIS — E78.00 HYPERCHOLESTEROLEMIA: ICD-10-CM

## 2021-01-22 ASSESSMENT — ANXIETY QUESTIONNAIRES
IF YOU CHECKED OFF ANY PROBLEMS ON THIS QUESTIONNAIRE, HOW DIFFICULT HAVE THESE PROBLEMS MADE IT FOR YOU TO DO YOUR WORK, TAKE CARE OF THINGS AT HOME, OR GET ALONG WITH OTHER PEOPLE: NOT DIFFICULT AT ALL
3. WORRYING TOO MUCH ABOUT DIFFERENT THINGS: MORE THAN HALF THE DAYS
7. FEELING AFRAID AS IF SOMETHING AWFUL MIGHT HAPPEN: NOT AT ALL
2. NOT BEING ABLE TO STOP OR CONTROL WORRYING: MORE THAN HALF THE DAYS
4. TROUBLE RELAXING: SEVERAL DAYS
GAD7 TOTAL SCORE: 8
6. BECOMING EASILY ANNOYED OR IRRITABLE: NOT AT ALL
5. BEING SO RESTLESS THAT IT IS HARD TO SIT STILL: NOT AT ALL
1. FEELING NERVOUS, ANXIOUS, OR ON EDGE: NEARLY EVERY DAY

## 2021-01-22 ASSESSMENT — PATIENT HEALTH QUESTIONNAIRE - PHQ9: SUM OF ALL RESPONSES TO PHQ QUESTIONS 1-9: 7

## 2021-02-10 ENCOUNTER — OFFICE VISIT - HEALTHEAST (OUTPATIENT)
Dept: INTERNAL MEDICINE | Facility: CLINIC | Age: 71
End: 2021-02-10

## 2021-02-10 DIAGNOSIS — Z00.00 ENCOUNTER FOR WELLNESS EXAMINATION IN ADULT: ICD-10-CM

## 2021-02-10 DIAGNOSIS — Z51.81 ENCOUNTER FOR THERAPEUTIC DRUG MONITORING: ICD-10-CM

## 2021-02-10 DIAGNOSIS — I71.40 ABDOMINAL AORTIC ANEURYSM (AAA) WITHOUT RUPTURE (H): ICD-10-CM

## 2021-02-10 DIAGNOSIS — I25.10 CORONARY ARTERY CALCIFICATION SEEN ON CAT SCAN: ICD-10-CM

## 2021-02-10 DIAGNOSIS — G62.9 SMALL FIBER NEUROPATHY: ICD-10-CM

## 2021-02-10 DIAGNOSIS — E78.00 HYPERCHOLESTEROLEMIA: ICD-10-CM

## 2021-02-10 DIAGNOSIS — M81.0 AGE-RELATED OSTEOPOROSIS WITHOUT CURRENT PATHOLOGICAL FRACTURE: ICD-10-CM

## 2021-02-10 DIAGNOSIS — Z00.00 ROUTINE GENERAL MEDICAL EXAMINATION AT A HEALTH CARE FACILITY: ICD-10-CM

## 2021-02-10 DIAGNOSIS — E55.9 VITAMIN D DEFICIENCY: ICD-10-CM

## 2021-02-10 LAB
ALBUMIN SERPL-MCNC: 4 G/DL (ref 3.5–5)
ALP SERPL-CCNC: 105 U/L (ref 45–120)
ALT SERPL W P-5'-P-CCNC: 11 U/L (ref 0–45)
ANION GAP SERPL CALCULATED.3IONS-SCNC: 7 MMOL/L (ref 5–18)
AST SERPL W P-5'-P-CCNC: 15 U/L (ref 0–40)
BILIRUB SERPL-MCNC: 0.6 MG/DL (ref 0–1)
BUN SERPL-MCNC: 16 MG/DL (ref 8–28)
CALCIUM SERPL-MCNC: 10.3 MG/DL (ref 8.5–10.5)
CHLORIDE BLD-SCNC: 106 MMOL/L (ref 98–107)
CHOLEST SERPL-MCNC: 195 MG/DL
CO2 SERPL-SCNC: 29 MMOL/L (ref 22–31)
CREAT SERPL-MCNC: 0.75 MG/DL (ref 0.6–1.1)
ERYTHROCYTE [DISTWIDTH] IN BLOOD BY AUTOMATED COUNT: 12.6 % (ref 11–14.5)
FASTING STATUS PATIENT QL REPORTED: YES
GFR SERPL CREATININE-BSD FRML MDRD: >60 ML/MIN/1.73M2
GLUCOSE BLD-MCNC: 92 MG/DL (ref 70–125)
HCT VFR BLD AUTO: 43.2 % (ref 35–47)
HDLC SERPL-MCNC: 76 MG/DL
HGB BLD-MCNC: 14.1 G/DL (ref 12–16)
LDLC SERPL CALC-MCNC: 101 MG/DL
MCH RBC QN AUTO: 31.4 PG (ref 27–34)
MCHC RBC AUTO-ENTMCNC: 32.6 G/DL (ref 32–36)
MCV RBC AUTO: 96 FL (ref 80–100)
PLATELET # BLD AUTO: 242 THOU/UL (ref 140–440)
PMV BLD AUTO: 8.9 FL (ref 7–10)
POTASSIUM BLD-SCNC: 4.2 MMOL/L (ref 3.5–5)
PROT SERPL-MCNC: 6.8 G/DL (ref 6–8)
RBC # BLD AUTO: 4.49 MILL/UL (ref 3.8–5.4)
SODIUM SERPL-SCNC: 142 MMOL/L (ref 136–145)
TRIGL SERPL-MCNC: 89 MG/DL
VIT B12 SERPL-MCNC: 858 PG/ML (ref 213–816)
WBC: 6.5 THOU/UL (ref 4–11)

## 2021-02-10 ASSESSMENT — MIFFLIN-ST. JEOR: SCORE: 1252.43

## 2021-02-11 LAB
25(OH)D3 SERPL-MCNC: 50.7 NG/ML (ref 30–80)
25(OH)D3 SERPL-MCNC: 50.7 NG/ML (ref 30–80)

## 2021-02-12 LAB — METHYLMALONATE SERPL-SCNC: 0.17 UMOL/L (ref 0–0.4)

## 2021-02-15 ENCOUNTER — COMMUNICATION - HEALTHEAST (OUTPATIENT)
Dept: INTERNAL MEDICINE | Facility: CLINIC | Age: 71
End: 2021-02-15

## 2021-03-09 ENCOUNTER — COMMUNICATION - HEALTHEAST (OUTPATIENT)
Dept: ADMINISTRATIVE | Facility: CLINIC | Age: 71
End: 2021-03-09

## 2021-03-09 DIAGNOSIS — M54.50 CHRONIC BILATERAL LOW BACK PAIN WITHOUT SCIATICA: ICD-10-CM

## 2021-03-09 DIAGNOSIS — G89.29 CHRONIC BILATERAL LOW BACK PAIN WITHOUT SCIATICA: ICD-10-CM

## 2021-03-23 ENCOUNTER — RECORDS - HEALTHEAST (OUTPATIENT)
Dept: ADMINISTRATIVE | Facility: OTHER | Age: 71
End: 2021-03-23

## 2021-03-24 ENCOUNTER — OFFICE VISIT - HEALTHEAST (OUTPATIENT)
Dept: PHYSICAL THERAPY | Facility: REHABILITATION | Age: 71
End: 2021-03-24

## 2021-03-24 DIAGNOSIS — M25.571 PAIN IN JOINT INVOLVING RIGHT ANKLE AND FOOT: ICD-10-CM

## 2021-03-24 DIAGNOSIS — M54.50 CHRONIC BILATERAL LOW BACK PAIN WITHOUT SCIATICA: ICD-10-CM

## 2021-03-24 DIAGNOSIS — M25.562 CHRONIC PAIN OF BOTH KNEES: ICD-10-CM

## 2021-03-24 DIAGNOSIS — M25.561 CHRONIC PAIN OF BOTH KNEES: ICD-10-CM

## 2021-03-24 DIAGNOSIS — M79.18 MYOFASCIAL PAIN: ICD-10-CM

## 2021-03-24 DIAGNOSIS — G89.29 CHRONIC BILATERAL LOW BACK PAIN WITHOUT SCIATICA: ICD-10-CM

## 2021-03-24 DIAGNOSIS — G89.29 CHRONIC PAIN OF BOTH KNEES: ICD-10-CM

## 2021-03-31 ENCOUNTER — OFFICE VISIT - HEALTHEAST (OUTPATIENT)
Dept: PHYSICAL THERAPY | Facility: REHABILITATION | Age: 71
End: 2021-03-31

## 2021-03-31 DIAGNOSIS — M25.571 PAIN IN JOINT INVOLVING RIGHT ANKLE AND FOOT: ICD-10-CM

## 2021-03-31 DIAGNOSIS — G89.29 CHRONIC PAIN OF BOTH KNEES: ICD-10-CM

## 2021-03-31 DIAGNOSIS — M25.561 CHRONIC PAIN OF BOTH KNEES: ICD-10-CM

## 2021-03-31 DIAGNOSIS — M54.50 CHRONIC BILATERAL LOW BACK PAIN WITHOUT SCIATICA: ICD-10-CM

## 2021-03-31 DIAGNOSIS — M25.562 CHRONIC PAIN OF BOTH KNEES: ICD-10-CM

## 2021-03-31 DIAGNOSIS — M79.18 MYOFASCIAL PAIN: ICD-10-CM

## 2021-03-31 DIAGNOSIS — G89.29 CHRONIC BILATERAL LOW BACK PAIN WITHOUT SCIATICA: ICD-10-CM

## 2021-04-09 ENCOUNTER — HOSPITAL ENCOUNTER (OUTPATIENT)
Dept: MAMMOGRAPHY | Facility: CLINIC | Age: 71
Discharge: HOME | End: 2021-04-09
Attending: INTERNAL MEDICINE

## 2021-04-09 DIAGNOSIS — Z12.31 VISIT FOR SCREENING MAMMOGRAM: ICD-10-CM

## 2021-04-12 ENCOUNTER — OFFICE VISIT - HEALTHEAST (OUTPATIENT)
Dept: PHYSICAL THERAPY | Facility: REHABILITATION | Age: 71
End: 2021-04-12

## 2021-04-12 DIAGNOSIS — M54.50 CHRONIC BILATERAL LOW BACK PAIN WITHOUT SCIATICA: ICD-10-CM

## 2021-04-12 DIAGNOSIS — M25.571 PAIN IN JOINT INVOLVING RIGHT ANKLE AND FOOT: ICD-10-CM

## 2021-04-12 DIAGNOSIS — G89.29 CHRONIC BILATERAL LOW BACK PAIN WITHOUT SCIATICA: ICD-10-CM

## 2021-04-12 DIAGNOSIS — G89.29 CHRONIC PAIN OF BOTH KNEES: ICD-10-CM

## 2021-04-12 DIAGNOSIS — M25.561 CHRONIC PAIN OF BOTH KNEES: ICD-10-CM

## 2021-04-12 DIAGNOSIS — M25.562 CHRONIC PAIN OF BOTH KNEES: ICD-10-CM

## 2021-04-12 DIAGNOSIS — M79.18 MYOFASCIAL PAIN: ICD-10-CM

## 2021-05-27 NOTE — PROGRESS NOTES
MTM Initial Encounter  Assessment & Plan                                                     1. Benign essential hypertension  Blood pressure is well controlled and meeting goal of <140/90 mm Hg per JNC-8 hypertension guidelines. Question continued need for lisinopril, given her weight loss and BPs typically on the lower end. Could consider trial off medication to prevent hypotension and simplify medication regimen. She will discuss with new PCP.   Plan   1. Consider trial off lisinopril.     2. Primary insomnia  Well controlled with lorazepam, but ideally would like to avoid long term use of benzodiazepine with associated adverse effects especially in the elderly. Since she has been on this for a long period of time, may need to taper before discontinuing. One alternative that she has not tried is trazodone. She will discuss with new PCP.   Plan   1. Consider alternative sleeping aid such as trazodone in place of lorazepam.     3. Irritable bowel syndrome/GERD  Partially controlled with current medication regimen. Medication education provided regarding dicyclomine and reviewed instructions that she can take this more often, up to four times a day as prescribed, for symptom relief. Also answered her questions regarding optimal timing/administration of omeprazole and ranitidine. Advised she follow up with GI for continued management.   Plan   1. Medication education re: dosing/use of dicyclomine.     4. Takes dietary supplements  High calcium level noted in July 2018 with recommendation to repeat, but not completed yet, as patient was unaware of this result. She does take a calcium supplement, although need for this is questionable. A recent meta-analysis found no improvement in bone mineral density or reduced fracture risk with calcium/vitamin D supplementation for primary prevention in those without osteopenia or osteoporosis. There have also been concerns with increased cardiovascular risk with calcium supplement  "use. Advised she stop calcium supplement and have calcium level rechecked next month.   Plan   1. Stop calcium supplement. Recheck level next month as previously ordered.     5. Hormone replacement therapy  Agree with using lowest effective dose of hormone replacement therapy for symptom relief. Discussed risks vs benefit of ongoing HRT use and patient has opted to continue as it has such a positive effect on her quality of life. Could attempt further dose reduction in the future.       Follow Up  PRN with MTM    Subjective & Objective                                                     Anais Kelly is a 69 y.o. female called for an initial visit for Medication Therapy Management. She was referred to me from  Part D program.     Chief Complaint: Medication review   Medication Adherence/Access: Good; no issues identified.     1. Benign essential hypertension  Anais is taking lisinopril 10 mg daily. She knows this is for high blood pressure. She says she lost significant amount of weight and kept it off. There has been discussion about whether or not she still requires a BP medication. She denies any symptoms of hypotension. No dizziness or lightheadedness.     2. Primary insomnia  Anais is taking lorazepam 1 mg at bedtime to help with sleep. She says she requires this and if she misses a dose she cannot sleep. She has struggled with insomnia for years. She's tried Ambien CR and she had a panic attack with palpitations that sent her to the ER. Diphenhydramine also caused anxiety.     3. Irritable bowel syndrome/GERD  Anais is taking dicyclomine 10 mg two times a day and omeprazole 20 mg every morning before food. She will take ranitidine as needed later in the day for indigestion. She has seen gastroenterologist. She has an \"onion family\" allergy, which she is very sensitive too and causes GI issues. She has history of abdominoplasty and pancolectomy in 2015 and says since then she has frequent " abdominal pain from adhesions. She sees PT regularly to try to help with pain.     4. Takes dietary supplements  Anais is taking several supplements including calcium citrate + vitamin D 1 tablet daily, vitamin D 5,000 IU daily, and vitamin B12 injection 1,000 mcg monthly. She recently changed calcium from carbonate to citrate as recommended by her friends. Sometimes she has trouble swallowing calcium pills. Last DEXA 2017 per [facility] with no noted osteopenia or osteoporosis per patient.     Vitamin D, Total (25-Hydroxy)   Date Value Ref Range Status   02/05/2016 64.1 30.0 - 80.0 ng/mL Final     Vitamin B-12   Date Value Ref Range Status   07/17/2018 420 213 - 816 pg/mL Final     5. Hormone replacement therapy  Anais is taking Premarin 0.625 mg daily. She had a total hysterectomy at age of 34. She has been on HRT since then. Over the years, she's tried decreasing dose and/or weaning off, but feels menopausal symptoms. She has gone done from 1.25 mg within the past few months. She tried going off for 1 month, but symptoms were unbearable with severe hot flashes.     PMH: reviewed in EPIC   Allergies/ADRs: reviewed in EPIC   Alcohol: reviewed in EPIC   Tobacco:   Social History     Tobacco Use   Smoking Status Never Smoker   Smokeless Tobacco Never Used     Vitals:   BP Readings from Last 3 Encounters:   12/12/18 100/66   09/12/18 108/70   08/22/18 122/78     Pulse Readings from Last 3 Encounters:   12/12/18 88   08/22/18 80   07/17/18 70     Wt Readings from Last 3 Encounters:   12/12/18 157 lb 1.6 oz (71.3 kg)   09/12/18 154 lb (69.9 kg)   08/22/18 156 lb 6.4 oz (70.9 kg)     ----------------    Much or all of the text in this note was generated through the use of Dragon Dictate voice-to-text software. Errors in spelling or words which seem out of context are unintentional. Sound alike errors, in particular, may have escaped editing.    The patient was given a CMS standardized format medication action  plan    I spent 60 minutes with this patient today. An extra 15 minutes was spent creating the Medication Action Plan. I offer these suggestions for consideration by the PCP. A copy of the visit note was provided to the patient's provider.     Lissy Ortega, PharmD, BCACP  Medication Management Pharmacist  [facility]        Current Outpatient Medications   Medication Sig Dispense Refill     calcium carbonate-vitamin D3 (CALTRATE 600 PLUS D3) 600 mg(1,500mg) -400 unit per tablet Take 1 tablet by mouth daily.       cholecalciferol, vitamin D3, (VITAMIN D3) 5,000 unit Tab Take 1 tablet by mouth daily.       dicyclomine (BENTYL) 10 MG capsule Take 10 mg by mouth 2 (two) times a day.       estrogens, conjugated, (PREMARIN) 0.625 MG tablet Take 1 tablet (0.625 mg total) by mouth daily. 90 tablet 3     fluticasone (FLONASE) 50 mcg/actuation nasal spray into each nostril.       lisinopril (PRINIVIL,ZESTRIL) 10 MG tablet Take 10 mg by mouth daily.       LORazepam (ATIVAN) 1 MG tablet Take 1-2 tablets (1-2 mg total) by mouth every 6 (six) hours as needed for anxiety. (Patient taking differently: Take 1 mg by mouth at bedtime as needed .      ) 20 tablet 0     omeprazole (PRILOSEC) 20 MG capsule Take 20 mg by mouth daily before breakfast.       RESTASIS 0.05 % ophthalmic emulsion TAKE 1 DROP(S) IN BOTH EYES 2 TIMES A DAY  3     Current Facility-Administered Medications   Medication Dose Route Frequency Provider Last Rate Last Dose     cyanocobalamin injection 1,000 mcg  1,000 mcg Intramuscular Q30 Days Shani Barker MD   1,000 mcg at 03/19/19 1006

## 2021-05-27 NOTE — PROGRESS NOTES
[facility] Re-Certification Request    April 9, 2019      Patient: Anais Kelly  MR Number: [id number]  YOB: 1950  Date of Visit: 4/9/2019      Dear Nimesh Conde, DO:    As you may recall, we have been seeing Anais Kelly for Physical Therapy of abdominal/myofascial pain.    For therapy to continue, Medicare and/or Medicaid requires periodic physician review of the treatment plan. Please review the summary of the patient's progress and our plan for continued therapy, and verify  that you agree therapy should continue by entering certification dates at the bottom of this note and co-signing this note.    Plan of Care  Authorization / Certification Start Date: 04/01/19  Authorization / Certification End Date: 07/01/19  Communication with: Referral Source  Patient Related Instruction: Nature of Condition;Treatment plan and rationale;Self Care instruction;Basis of treatment;Body mechanics;Posture;Precautions;Next steps;Expected outcome  Times per Week: 1  Number of Weeks: 12  Number of Visits: 12  Discharge Planning: discharge to [facility] once goals are met  Therapeutic Exercise: ROM;Stretching;Strengthening  Neuromuscular Reeducation: kinesio tape;posture;balance/proprioception;TNE;core  Manual Therapy: soft tissue mobilization;myofascial release;joint mobilization;muscle energy;strain counterstrain        Goal Status: - PROGRESSING TOWARDS ALL GOALS  Pt. will demonstrate/verbalize independence in self-management of condition in : 12 weeks  Pt. will report decreased intensity, frequency of : Pain;in 6 weeks;Comment  Comment:: decrease pain from 0-8/10 to 0-4/10 with ADLs.   Pt. will be able to walk : 20 minutes;on even surfaces;with less pain;with less difficulty;for household mobility;for community mobility;for exercise/recreation;in 12 weeks  Patient will sit: 10 minutes;for driving;for watching TV;with less difficultty;with no pain;in 12 weeks;Comment  Comment: without having to  slouch in her chair        If you have any questions or concerns, please don't hesitate to call.    Sincerely,      Bennie Hernandez PT, ATC        Physician recommendation:     ___ Follow therapist's recommendation        ___ Modify therapy      *Physician co-signature indicates they certify the need for these services furnished within this plan and while under their care.        [facility] Daily Progress     Patient Name: Anais Kelly  Date: 2019  Visit #: 18  Referral Diagnosis: Abdominal pain, epigastric  Referring provider: Nimesh Lind DO  Visit Diagnosis:     ICD-10-CM    1. Generalized abdominal pain R10.84    2. Myofascial pain M79.18          Assessment:     Patient is benefitting from skilled physical therapy and is making steady progress toward functional goals.  Patient is appropriate to continue with skilled physical therapy intervention, as indicated by initial plan of care.  She overall does seem to have good response to treatment although it does not take away her pain. She is more functional and able to do more while still receiving treatments.     Goal Status: - PROGRESSING TOWARDS ALL GOALS  Pt. will demonstrate/verbalize independence in self-management of condition in : 12 weeks  Pt. will report decreased intensity, frequency of : Pain;in 6 weeks;Comment  Comment:: decrease pain from 0-8/10 to 0-4/10 with ADLs.   Pt. will be able to walk : 20 minutes;on even surfaces;with less pain;with less difficulty;for household mobility;for community mobility;for exercise/recreation;in 12 weeks  Patient will sit: 10 minutes;for driving;for watching TV;with less difficultty;with no pain;in 12 weeks;Comment  Comment: without having to slouch in her chair        Plan / Patient Education:     initiate SCS/MT and progress ex as tolerated.   Start her on a basic HEP for general exercise   Continue abdominal and core strengthening     Subjective:     Pain Ratin-3 mostly         She is  "\"on fire\". The whole area across her lower abdomen is really tough right now. There can be some shooting pains at times in the lower abdomen as well.   She has found that after not being able to make it in the last 3 weeks her pain has been getting worse. She is still trying to be as active at home as she can be while still respecting the pain she does have.     Objective:     Barriers to Learning or Achieving Goals: Chronicity of the problem.       LV, visc fascial tensions.       Treatment Today     TREATMENT MINUTES COMMENTS   Evaluation     Self-care/ Home management     Manual therapy 55 Fascial release using Strain-Counterstrain of B abd-V, B abd sphincters-V, B abd-LV   Neuromuscular Re-education     Therapeutic Activity     Therapeutic Exercises     Gait training     Modality__________________                Total 55    Blank areas are intentional and mean the treatment did not include these items.       Bennie Hernandez, PT  4/9/2019    "

## 2021-05-28 NOTE — PROGRESS NOTES
[facility] Daily Progress     Patient Name: Anais Kelly  Date: 2019  Visit #: 19  Referral Diagnosis: Abdominal pain, epigastric  Referring provider: Nimesh Lind DO  Visit Diagnosis:     ICD-10-CM    1. Generalized abdominal pain R10.84    2. Myofascial pain M79.18          Assessment:     Patient is benefitting from skilled physical therapy and is making steady progress toward functional goals.  Patient is appropriate to continue with skilled physical therapy intervention, as indicated by initial plan of care.  She does con't to have a regression in her sx when not being seen. With spinal vessel work today hopefully it will help to improve her sensitization.     Goal Status: - PROGRESSING TOWARDS ALL GOALS  Pt. will demonstrate/verbalize independence in self-management of condition in : 12 weeks  Pt. will report decreased intensity, frequency of : Pain;in 6 weeks;Comment  Comment:: decrease pain from 0-8/10 to 0-4/10 with ADLs.   Pt. will be able to walk : 20 minutes;on even surfaces;with less pain;with less difficulty;for household mobility;for community mobility;for exercise/recreation;in 12 weeks  Patient will sit: 10 minutes;for driving;for watching TV;with less difficultty;with no pain;in 12 weeks;Comment  Comment: without having to slouch in her chair        Plan / Patient Education:     initiate SCS/MT and progress ex as tolerated.   Start her on a basic HEP for general exercise   Continue abdominal and core strengthening     Subjective:     Pain Ratin-7         Things are going ok but the stomach is still hurting like crazy. She doesn't really seem to make any correlations. It is even very tender to light touch from her.   She did do a lot of yard work over the weekend and it was really pretty good.     Objective:     Barriers to Learning or Achieving Goals: Chronicity of the problem.       LV, art, visc, neural fascial tensions.       Treatment Today     TREATMENT MINUTES  COMMENTS   Evaluation     Self-care/ Home management     Manual therapy 55 Fascial release using Strain-Counterstrain of B lumbar/TL spinal vein/art F/E-A/LV, sigm/L-V, lumbar/lower thor post-gang-N, B lumbar MED-A   Neuromuscular Re-education     Therapeutic Activity     Therapeutic Exercises     Gait training     Modality__________________                Total 55    Blank areas are intentional and mean the treatment did not include these items.       Bennie Hernandez, PT  4/30/2019

## 2021-05-28 NOTE — PROGRESS NOTES
"Assessment/plan   Anais Kelly is a 69 y.o. female who is new to my practice.    Anais was seen today for establish care.    Diagnoses and all orders for this visit:    Primary insomnia  Controlled substance agreement signed 5/9/19- Ativan 1mg for Insomnia  Primary focus of today's visit; long conversation regarding use of benzodiazepine in elderly. Currently well controlled with 1mg ativan at bedtime. Pt agreeable to try alternative medication. Discussed taper plan of the benzo leading up to trial of trazodone. She is going to make this switch at a point in the next month or so when her \"life settles down\" socially.   - CSA signed today to continue ativan 1mg at bedtime #30/month with q6 month follow up until we know if trazodone will be effective or not  -  reviewed, no concerns- has had recent refill of her ativan and will not need Rx from me unless pharmacy indicates having an issue filling it from former PCP  -     traZODone (DESYREL) 50 MG tablet; Take 1-2 tablets ( mg total) by mouth at bedtime.      Benign essential hypertension  Blood pressure is well controlled and meeting goal of <140/90 mm Hg per JNC-8 hypertension guidelines. Has had significant weight loss in recent years; BPs are on lower end and sometimes lightheaded from this.    - She is interested in stopping lisinopril for med simplification.  - She will check BPs daily while off ACEI and call if BPs >140/90.     Hormone replacement therapy, postmenopausal  Discussed risks vs benefit of ongoing HRT use and patient has opted to continue as it has such a positive effect on her quality of life.Will continue to work to wean as able.     Irritable bowel syndrome with diarrhea  On dicyclomine; has f/u with GI planned    Follow up:  6 months.  she will come back to discuss low libido (wants something for this); labs at that visit    Eve Contreras MD    Subjective:      HPI: Anais Kelly is a 69 y.o. female who is here " for:    Chief Complaint   Patient presents with     Establish Care     She was previously with Dr. Barker and is following with our pharmacist as well.    HTN: on lisinopril 10 mg daily. Pharmacy recommended to consider a trial off this due to the fact that she has has lost a lot of weight and is following some dietary changes.  Lost 55lbs 10 years ago, then creeped up a little.This would help simplify med regimen for pt. No lightheadedness dizziness.    Insomnia: Has using Ativan 1 mg at bedtime to help with sleep- she has a refill avail at the pharmacy. She will call if pharmacy says they can't fill.  If she misses a dose she is unable to sleep. Was recommended to start trazodone instead.  Failed therapies include Ambien CR causing a panic attack and palpitations causing her first ever ER visit (about 2 years ago), Benadryl similarly worsened anxiety.    IBS/GERD: Dicyclomine 10 mg twice daily and omeprazole 20 mg daily.  Ranitidine she uses as needed later on in the day for indigestion.  She has been to GI.  She avoids onions which cause worsening in her IBS symptoms. She has history of abdominoplasty and pancolectomy in 2015 and says since then she has frequent abdominal pain from adhesions. She sees PT regularly to try to help with pain.     HRT: Currently taking Premarin 0.625 mg.  Hysterectomy age 34 and on hormone replacement since that time.  She has successfully decreased from 1.25 mg from 2 months ago.  In the past when it she has tried to decrease her wean she has recurrence of her severe hot flashes.    Recently stopped her calcium supplement. Ca level was 10.9 last time.      Review of Systems:  + low libido (mentions this on the way out the door, asked her to come back to discuss further)  12 point comprehensive review of systems was negative except as noted and HPI     Social History:  Social History     Social History Narrative    Retired.      for 52 years and has 3 boys.       Medical  History:  Patient Active Problem List   Diagnosis     Irritable bowel syndrome with diarrhea     Benign essential hypertension     Insomnia     Small fiber neuropathy (H); diagnosed by neurology     Abdominal pain, chronic, bilateral lower quadrant     Controlled substance agreement signed 5/9/19- Ativan 1mg for Insomnia     Hormone replacement therapy, postmenopausal     History reviewed. No pertinent past medical history.  Past Surgical History:   Procedure Laterality Date     HYSTERECTOMY  1984     OOPHORECTOMY Bilateral 1984     Amoxicillin; Celebrex [celecoxib]; Vicodin [hydrocodone-acetaminophen]; Codeine; Diphenhydramine; and Zolpidem  Family History   Problem Relation Age of Onset     Cancer Father 72        Bladder Cancer     Multiple sclerosis Mother      No Medical Problems Brother        Medications:  Current Outpatient Medications   Medication Sig Dispense Refill     cholecalciferol, vitamin D3, (VITAMIN D3) 5,000 unit Tab Take 1 tablet by mouth daily.       dicyclomine (BENTYL) 10 MG capsule Take 10 mg by mouth 4 (four) times a day as needed.             erythromycin ophthalmic ointment Administer 1 application to both eyes at bedtime.       estrogens, conjugated, (PREMARIN) 0.625 MG tablet Take 1 tablet (0.625 mg total) by mouth daily. 90 tablet 3     LORazepam (ATIVAN) 1 MG tablet Take 1-2 tablets (1-2 mg total) by mouth every 6 (six) hours as needed for anxiety. (Patient taking differently: Take 1 mg by mouth at bedtime as needed .      ) 20 tablet 0     multivitamin therapeutic tablet Take 1 tablet by mouth daily.       omeprazole (PRILOSEC) 20 MG capsule Take 20 mg by mouth daily before breakfast.       ranitidine (ZANTAC) 150 MG tablet Take 150 mg by mouth 2 (two) times a day as needed.             RESTASIS 0.05 % ophthalmic emulsion TAKE 1 DROP(S) IN BOTH EYES 2 TIMES A DAY  3     traZODone (DESYREL) 50 MG tablet Take 1-2 tablets ( mg total) by mouth at bedtime. 30 tablet 0     Current  Facility-Administered Medications   Medication Dose Route Frequency Provider Last Rate Last Dose     cyanocobalamin injection 1,000 mcg  1,000 mcg Intramuscular Q30 Days Shani Barker MD   1,000 mcg at 04/30/19 1055       Imaging & Labs reviewed this visit:     July 2018: BMP normal aside from calcium mildly up at 10.9 while on a calcium supplement (no longer taking); CBC normal, TSH and VitB12 normal    Objective:      Vitals:    05/09/19 1232   BP: 102/82   Pulse: 72   Weight: 155 lb 12.8 oz (70.7 kg)       Physical Exam:     General: Alert, no acute distress.   HEENT: normocephalic conjunctivae are clear,  Neck: supple without adenopathy or thyromegaly.  Lungs: Good aeration bilaterally. Clear to auscultation without wheezes, rales or rhonci.   Heart: regular rate and rhythm, normal S1 and S2, no murmurs. No LE edema  Abdomen: soft and nontender  Skin: clear without rash or lesions  Neuro: alert, interactive moving all extremities equally      Eve Contreras MD

## 2021-05-28 NOTE — PATIENT INSTRUCTIONS - HE
Ok to stop the lisinopril.   Check BPs daily and call if BPs >140/90. We can consider restarting at a lower dose even if needed.    When ready to try the new sleep medicine (trazodone), try the following:  Take 1/2 tablet of the lorazepam (0.5mg) at bedtime for a few nights leading up to the switch.  Then on the night you want to start taking trazodone, take 1 pill about 1 hour before bedtime. Ideally do not take the lorazepam that night to see how you do. If you wake up, or can't fall asleep, it's ok to to take 0.5mg of lorazepam.

## 2021-05-28 NOTE — TELEPHONE ENCOUNTER
"Pt calls to report L thumb and hand symptoms of gradual onset over the past one week.  \"Knuckle on thumb suddenly felt sore and started swelling about a week ago.\"  No known injury.  \"Each day more swollen and discolored.\"  \"Today purple bruising extends throughout entire thumb and into palm of hand.\"  Spreading.  \"Painful.\"  \"More swollen today.\"  Not on blood thinners.    Advised ED eval.  Pt agrees -> states intention to go to [facility].    Sara Maldonado RN BSBA  [facility] RN Triage     Reason for Disposition    [1] Raised bruise AND [2] size > 2 inches (5 cm) AND [3] expanding    Protocols used: BRUISES-A-AH      "

## 2021-05-29 NOTE — TELEPHONE ENCOUNTER
I left a message with Sporothrix antibody result -- negative, could be falsely negative early in infection. She sees Dr. Darby today.     Bennie Malcolm

## 2021-05-29 NOTE — PROGRESS NOTES
Second attempt to reach patient for hospital discharge follow up, no answer.  Left VM message reminding pt of their appt 5/24/19 at 10:40am.  RN has made two unsuccessful attempts to reach patient.  Encounter closed.    Jyoti Macias RN Care Manager, Population Health

## 2021-05-29 NOTE — PROGRESS NOTES
"[facility] Daily Progress     Patient Name: Anais Kelly  Date: 2019  Visit #: 20  Referral Diagnosis: Abdominal pain, epigastric  Referring provider: Nimesh Lind DO  Visit Diagnosis:     ICD-10-CM    1. Generalized abdominal pain R10.84    2. Myofascial pain M79.18          Assessment:     Patient is benefitting from skilled physical therapy and is making steady progress toward functional goals.  Patient is appropriate to continue with skilled physical therapy intervention, as indicated by initial plan of care.  She did have improvement in her abdominal pain after treatment today. In treating distal gray rami it should help to decrease overall ANS tone.     Goal Status: - PROGRESSING TOWARDS ALL GOALS  Pt. will demonstrate/verbalize independence in self-management of condition in : 12 weeks  Pt. will report decreased intensity, frequency of : Pain;in 6 weeks;Comment  Comment:: decrease pain from 0-8/10 to 0-4/10 with ADLs.   Pt. will be able to walk : 20 minutes;on even surfaces;with less pain;with less difficulty;for household mobility;for community mobility;for exercise/recreation;in 12 weeks  Patient will sit: 10 minutes;for driving;for watching TV;with less difficultty;with no pain;in 12 weeks;Comment  Comment: without having to slouch in her chair        Plan / Patient Education:     initiate SCS/MT and progress ex as tolerated.   Start her on a basic HEP for general exercise   Continue abdominal and core strengthening     Subjective:     Pain Ratin-7         In having a break from treatments she does seem worse than she was before.   She isn't exactly sure what to do with all of it at this point.   Even last night she put on some \"snug not tight\" pants and her abdomen hurt all night.     Objective:     Barriers to Learning or Achieving Goals: Chronicity of the problem.       Visc, neural fascial tensions.       Treatment Today     TREATMENT MINUTES COMMENTS   Evaluation   "   Self-care/ Home management     Manual therapy 55 Fascial release using Strain-Counterstrain of B lumbar SAF-A, sigm/L-V, lumbar/lower thor post-gang-N distal gray-N, abd pre-gang distal gray-N   Neuromuscular Re-education     Therapeutic Activity     Therapeutic Exercises     Gait training     Modality__________________                Total 55    Blank areas are intentional and mean the treatment did not include these items.       Bennie Hernandez, PT  5/31/2019

## 2021-05-29 NOTE — TELEPHONE ENCOUNTER
Pt informed that we do not have any female provider in IM that are taking new patients. I set her up for a INF appointment with Lida Santiago on 5/24/19

## 2021-05-29 NOTE — PROGRESS NOTES
Hospital discharge follow up call to pt, no answer.  Left VM message reminding patient of their upcoming appt 5/24/19 at 10:40am.  RN will attempt call back at another time.    Jyoti Macias RN Care Manager, Population Health

## 2021-05-29 NOTE — PROGRESS NOTES
[facility] Daily Progress     Patient Name: Anais Kelly  Date: 2019  Visit #: 21  Referral Diagnosis: Abdominal pain, epigastric  Referring provider: Nimesh Lind DO  Visit Diagnosis:     ICD-10-CM    1. Generalized abdominal pain R10.84    2. Myofascial pain M79.18          Assessment:     Patient is benefitting from skilled physical therapy and is making steady progress toward functional goals.  Patient is appropriate to continue with skilled physical therapy intervention, as indicated by initial plan of care.  I think it would be beneficial for her to have another opinion. Her adhesions are likely her main culprit for her pain.     Goal Status: - PROGRESSING TOWARDS ALL GOALS  Pt. will demonstrate/verbalize independence in self-management of condition in : 12 weeks  Pt. will report decreased intensity, frequency of : Pain;in 6 weeks;Comment  Comment:: decrease pain from 0-8/10 to 0-4/10 with ADLs.   Pt. will be able to walk : 20 minutes;on even surfaces;with less pain;with less difficulty;for household mobility;for community mobility;for exercise/recreation;in 12 weeks  Patient will sit: 10 minutes;for driving;for watching TV;with less difficultty;with no pain;in 12 weeks;Comment  Comment: without having to slouch in her chair        Plan / Patient Education:     initiate SCS/MT and progress ex as tolerated.   Start her on a basic HEP for general exercise   Continue abdominal and core strengthening     Subjective:     Pain Ratin-7         She is thinking that she may go have another opinion at [place].   She has been having a lot of pain in her the lower abdomen. The only time she doesn't seem to have pain is right when she wakes up.     Objective:     Barriers to Learning or Achieving Goals: Chronicity of the problem.       Visc fascial tensions.       Treatment Today     TREATMENT MINUTES COMMENTS   Evaluation     Self-care/ Home management     Manual therapy 55 MFR: lower abdomen  releases in and around pelvis.    Neuromuscular Re-education     Therapeutic Activity     Therapeutic Exercises     Gait training     Modality__________________                Total 55    Blank areas are intentional and mean the treatment did not include these items.       Bennie Hernandez, PT  6/18/2019

## 2021-05-29 NOTE — TELEPHONE ENCOUNTER
New Appointment Needed  What is the reason for the visit:  INF, Est Care  Inpatient/ED Follow Up Appt Request  At what hospital or facility were you seen?: [facility]  What is the reason you were seen?: Left thumb into palm of hand was purple  What date were you admitted?: date: 05-17-19 Patient first went to  ER on Thursday 05-16-19 and then was sent to [facility] at 1130 pm on Thursday, and was admitted to [facility] at 1230 am, patient discharged on Friday [id number] pm  What date were you discharged?: date: 05-17-19  What was the recommended timeframe for your follow up appointment?: 7 days  Provider Preference: Prefers a Female provider, prefers an Internist. Really prefers to go to [facility].  Patient's Friend had recommended Dr. Emilee eLmus to patient per patient.  How soon do you need to be seen?: Anytime this Friday 05-24-19, cannot come in today, nor this Thursday, could come in tomorrow/Wednesday afternoon if needed  Waitlist offered?: No  Okay to leave a detailed message:  Yes    Patient does not want Dr. Eve Contreras as her PCP, and declines to set up an appointment with her.

## 2021-05-29 NOTE — PROGRESS NOTES
Assessment/Plan:     1. Ischemic finger  Symptoms almost completely resolved.  Femoral groin site is healing well.  Infectious disease work-up negative for fungal infection.  Given that symptoms are resolving, I do not recommend any further work-up at this time.  I do recommend to follow-up with the orthopedic hand surgeon with any recurring symptoms.    2. Vaginal irritation  Suspect atrophic vaginitis.  She is on oral hormone replacement.  May consider localized estrogen cream if the symptoms are recurrent and bothersome, but I will defer this to her PCP.  Patient may continue topical cream as it is helping, but I would stop using this as soon as symptoms resolve.  I would not recommend a steroid such as hydrocortisone for longer than 1 week.  I did discuss the use of a vaginal lubricant.        Subjective:     Anais Kelly is a 69 y.o. female who presents for hospital follow-up.  Patient was seen in the ER about 1 week ago and admitted for 1 night.  Patient presented with a blue left thumb.  She denies ever having significant pain, swelling, or loss of sensation.  She is right-hand dominant.  Patient had a significant work-up including venous and arterial ultrasound, x-ray, CTA of the upper extremity and an angiogram.  There was no occlusion noted on the CTA.  The angiogram revealed microvascular disease not consistent with vasospasm.  There was noted to be concern for infectious etiology of the thumb.  Infectious disease was consulted.  Checked for Sporothrix antibody, which was negative.  Patient was discharged with instructions to follow-up with orthopedic hand surgeon.  She did speak with him over the phone, as her symptoms are almost completely resolved.  He stated that she did not need to follow-up at this time.  The discoloration to her thumb has almost completely resolved.  She still has a little tenderness to the pad of the thumb.  No swelling or trouble with range of motion.  Patient thinks she  actually may have had a bruise.  There are 3 areas that she uses her left hand and thumb a lot to open things, and thinks she may have bruised the hand.  Her femoral groin site from the angiogram is feeling good.  She is not having any significant pain, swelling, bleeding, or drainage.    She complains of vaginal irritation.  No fever, dysuria, or hematuria.  She mostly notices irritation after wiping.  Yesterday, her major and minor labia were stuck together.  History of a hysterectomy.  She is on oral estrogen replacement.  No vaginal discharge, itching, or bleeding.  She is using an over-the-counter vaginal cream, which is helpful.  She is unsure of what the cream is called.      The following portions of the patient's history were reviewed and updated as appropriate: allergies, current medications.    Review of Systems  A comprehensive review of systems was performed and was otherwise negative    Objective:     /80   Pulse 80   Temp 98  F (36.7  C)   Wt 154 lb 8 oz (70.1 kg)   LMP 07/10/1984   BMI 23.49 kg/m      General Appearance: Alert, cooperative, no distress, appears stated age  Lungs: Clear to auscultation bilaterally, respirations unlabored  Heart: Regular rate and rhythm, S1 and S2 normal, no murmur, rub, or gallop   Pelvic:Perineum: is normal  Vulva: normal  Vagina: normal mucosa  Extremities: Left thumb appears normal without acute color change, swelling, or redness.  Normal range of motion.  No tenderness.  Radial pulse intact.  Skin: right femoral groin site is CDI. No swelling or drainage. Mild bruising.   Lida Santiago NP

## 2021-05-31 NOTE — PROGRESS NOTES
[facility] clinic Follow Up Note    Anais Kelly   69 y.o. female    Date of Visit: 8/13/2019    Chief Complaint   Patient presents with     Establish Care     Subjective  Anais is a former patient of Dr. Shani Barker here to establish care.    Her main issue is chronic insomnia and anxiety.  She has not been on SSRI in the past, but has been on lorazepam 1 mg in the evening for many years.    About 2 years ago she stopped the Lorazepam cold turkey and instead took Ambien CR 12.5 mg.  But had severe anxiety reaction and panic attack with that and went back to lorazepam with resolution of her symptoms.    In May she saw her family practitioner with plans to wean off lorazepam and use trazodone, but she never picked up the trazodone.    She denies depression, although has had some anxiety and stress with her  with their group home.    She is planning a cruise in 1 month and did not want to change medications before the cruise.    She also has an issue with small fiber peripheral neuropathy which was diagnosed a number of years ago.  Patient denies significant alcohol use and denies sleep apnea symptoms or daytime sleepiness.  She has been slowly progressive over a number of years.  No falls or significant unsteady gait.  She states she saw a neurologist over 15 years ago, had a full MRI of her brain and neck and was told she did not have multiple sclerosis.    Patient has not tried gabapentin.    No foot sores.  Mild burning discomfort at night.  She uses an over-the-counter lotion but not capsaicin.    No new back pain or radicular type pain.    She was diagnosed with a B12 deficiency last year.  I did review lab work from July 2018 with an elevated MMA level but a B12 level of 420.  Patient has been getting monthly B12 shots.  She does feel it gives her some energy and makes her feel better and does wish to continue on that.    I did review lab work from May 2019 with normal kidney labs.   Normal blood sugar.  Normal hemogram.    She is status post hysterectomy.  She is on Premarin daily for postmenopausal symptoms.  I did review the mammogram from December 2018 which was negative.    I did review colonoscopy from October 2014 that showed some diverticulosis, otherwise negative and 10-year follow-up plan.    Patient has chronic irritable bowel and chronic bilateral lower abdominal tenderness.    Patient had a abdominoplasty for tummy tuck a number of years ago, proximal he 5 years ago.  There was a complication of significant scar tissue and need for further resection and significant stitching.  She has chronic tightness and achiness to her bilateral lower bowel since then.  Is been stable.  No history of bowel obstruction.    She generally is on the loose side with her bowels and does not want to use MiraLAX or fiber.    She is tried yogurt and probiotics in the past but that does not help her.    She does have loperamide that she is used occasionally.    I did review the abdominal CT scan from April 2018 and was essentially negative.    She is never smoked.  No cough or increasing shortness of breath.  No chest pain or palpitations.    She had a diagnosis of hypertension in the past but low blood pressures on lisinopril and she stopped that last winter.  Her blood pressures been running in the 1 10-1 20s over 60s on her home checks.    I did review lab work from December 2017 with an LDL of 153 and HDL 67.  She has not been on statins.    Bilateral knee osteoarthritis.  I did review the orthopedic note from July 2019, previous steroid shots have not helped much.  She is thinking about getting further shots before her trip, however.  No acute pain or red swollen joints at this time.    She is retired.  Has 3 boys.  Lives independently.    Her mother had multiple sclerosis    No headache complaints.    Previous right carpal tunnel release surgery.  She has some mild intermittent right hand  paresthesias symptoms when writing with a pen, but otherwise not having continued symptoms.    PMHx:  No past medical history on file.  PSHx:    Past Surgical History:   Procedure Laterality Date     HYSTERECTOMY  1984     IR EXTREMITY ANGIOGRAM LEFT  5/17/2019     OOPHORECTOMY Bilateral 1984     Immunizations:   Immunization History   Administered Date(s) Administered     Influenza, Seasonal, Inj PF IIV3 11/15/1996, 09/29/1997, 07/27/1998, 12/12/2000, 10/01/2002, 10/01/2003, 11/02/2007, 09/09/2009     Influenza, inj, historic,unspecified 10/01/2014     Influenza,seasonal, Inj IIV3 11/10/2006, 10/22/2010, 10/11/2011, 10/09/2012     Pneumo Conj 13-V (2010&after) 06/29/2015     Pneumo Polysac 23-V 04/15/2013     Td, adult adsorbed, PF 03/11/1999     Tdap 01/26/2009     ZOSTER, LIVE 10/16/2009     ZOSTER, RECOMBINANT, IM 07/24/2018, 10/20/2018       ROS A comprehensive review of systems was performed and was otherwise negative    Medications, allergies, and problem list were reviewed and updated    Exam  /80 (Patient Site: Right Arm, Patient Position: Sitting, Cuff Size: Adult Regular)   Pulse 88   Wt 156 lb 4.8 oz (70.9 kg)   LMP 07/10/1984   BMI 23.77 kg/m    Alert and oriented.  Appears mildly anxious.  Just mildly overweight.  She does not have a carotid pharynx.  Normal-appearing pharynx.  Teeth in good condition.  No JVD and no carotid bruits.  No cervical or supraclavicular adenopathy.  No thyromegaly or nodularity.  Lungs are clear to auscultation with good respiratory excursion.  Heart is regular with no murmur rub or gallop.  Abdomen is mildly overweight.  She does have significant scar tissue in her bilateral lower abdomen area with more prominence of tenderness in her left lower quadrant but no guarding.  No hepatospleno megaly or significant epigastric tenderness.  I did not feel a pulsatile mass.  No ankle edema.  Feet skin appeared normal.  No pre-ulcerative calluses or ulcers.  Gait within  normal limits.  No foot drop.    Assessment/Plan  1. Insomnia, unspecified type  Chronic issue.  Chronic anxiety.  Does not appear to have depression as underlying issue.    Long-term lorazepam use with tolerance that is developed.  I discussed withdrawal anxiety which appears she had in the past when trying Ambien.    Follow-up after her cruise, to start trazodone 50 mg a day and begin reducing lorazepam down to 0.5 mg in the evening once she gets used to the trazodone.  Then plan to slowly wean down the lorazepam over time, increase trazodone as needed or consider adding gabapentin because of her neuropathy symptoms.    Could also consider melatonin.    I discussed fall risk with her lorazepam use as well as effect on memory.  Patient does agree to plan to try to wean off the lorazepam over time.    It appears she may need 1 more refill of her lorazepam prior to her next appointment.  She did sign a controlled substance agreement in May of this year.    2. Idiopathic small fiber sensory neuropathy  Mild and progressive.  She denies alcohol or sleep apnea history.    Possible B12 deficiency now being addressed with regular shots.    Just mildly unsteady gait without falls.  Wear good orthotic shoes at all times.    I did discuss consideration for gabapentin 300 mg in the evening, but she wanted to wait on that until after her cruise.    3. Vitamin B12 deficiency (non anemic)  Continue monthly B12 shots.  Could consider reduction of frequency down to every 3 months in the future.    4. History of hysterectomy  Does not follow with gynecology    On Premarin.  Yearly mammogram in December    5. Abdominal pain, left lower quadrant  Extensive scar tissue from previous surgery complication.  Patient was told she would be at risk for bowel obstruction.  Patient was told to avoid constipation.  She was told to avoid loperamide, unless needed for important social engagement.  She was warned on risk of bowel obstruction with  that.    I did discuss a low dose of MiraLAX on a regular basis could be used to keep her bowels regular but she is somewhat nervous about loose stools with that.    She states that regular fiber and yogurt and probiotic has not worked for her in the past.    6. Primary osteoarthritis of both knees  Follow-up with orthopedic clinic as needed, could consider injections.    10-year colonoscopy would be due October 2024    She is due for a tetanus shot    Follow-up as needed for carpal tunnel symptoms worsen.    Her blood pressure remains normal off lisinopril.  She does appear to be low cardiovascular risk should have her LDL cholesterol rechecked.  Plan to follow-up at her physical.    Return in about 6 weeks (around 9/24/2019) for Recheck.   Patient Instructions   Continue current Lorazepam 1 mg in the evening at this time.    Follow-up with me when you return from your cruise.  I will have you start on trazodone 50 mg in the evening in addition to lorazepam at that time.  I will have you slowly wean down on the lorazepam dose over time.    Use loperamide only when needed to stop loose stools.  Loperamide does put you at risk for bowel obstruction or constipation.  You could consider a low-dose of MiraLAX on a daily basis to keep your stools on the loose side.    You could consider gabapentin in the evening to help with neuropathy and help you sleep.    Plan to set up an adult wellness visit physical exam later this fall or winter.        Lopez Gordon MD  I spent a total time with patient of over 40 minutes and over 50% coord care.  Time all face to face.      Current Outpatient Medications   Medication Sig Dispense Refill     cholecalciferol, vitamin D3, (VITAMIN D3) 5,000 unit Tab Take 5,000 Units by mouth daily.              dicyclomine (BENTYL) 10 MG capsule Take 10 mg by mouth 2 (two) times a day.              erythromycin ophthalmic ointment Administer 1 application to both eyes at bedtime.       estrogens,  "conjugated, (PREMARIN) 0.625 MG tablet Take 1 tablet (0.625 mg total) by mouth daily. 90 tablet 3     LORazepam (ATIVAN) 1 MG tablet Take 1 mg by mouth at bedtime.       multivitamin therapeutic tablet Take 1 tablet by mouth daily.       omeprazole (PRILOSEC) 20 MG capsule Take 20 mg by mouth daily before breakfast.       ranitidine (ZANTAC) 150 MG tablet Take 150 mg by mouth 2 (two) times a day as needed.             RESTASIS 0.05 % ophthalmic emulsion TAKE 1 DROP(S) IN BOTH EYES daily  3     loperamide (IMODIUM) 2 mg capsule Take 2 tablets (4 mg) by mouth after 1st loose stool and 1 tablet (2 mg) after each next bowel movement. Do not exceed 16 mg in 24 hours.  6     traZODone (DESYREL) 50 MG tablet Take 1-2 tablets ( mg total) by mouth at bedtime. (Patient not taking: Reported on 8/13/2019      ) 30 tablet 0     Current Facility-Administered Medications   Medication Dose Route Frequency Provider Last Rate Last Dose     cyanocobalamin injection 1,000 mcg  1,000 mcg Intramuscular Q30 Days Lan Perkins MD   1,000 mcg at 08/01/19 1130     Allergies   Allergen Reactions     Amoxicillin Diarrhea     Celebrex [Celecoxib] Other (See Comments)     \"stomach upset\"     Vicodin [Hydrocodone-Acetaminophen] Nausea Only     Codeine Rash     Diphenhydramine Anxiety     hyper     Zolpidem Anxiety and Palpitations     Social History     Tobacco Use     Smoking status: Never Smoker     Smokeless tobacco: Never Used   Substance Use Topics     Alcohol use: Yes     Comment: 1 a month     Drug use: No           "

## 2021-05-31 NOTE — PATIENT INSTRUCTIONS - HE
Continue current Lorazepam 1 mg in the evening at this time.    Follow-up with me when you return from your cruise.  I will have you start on trazodone 50 mg in the evening in addition to lorazepam at that time.  I will have you slowly wean down on the lorazepam dose over time.    Use loperamide only when needed to stop loose stools.  Loperamide does put you at risk for bowel obstruction or constipation.  You could consider a low-dose of MiraLAX on a daily basis to keep your stools on the loose side.    You could consider gabapentin in the evening to help with neuropathy and help you sleep.    Plan to set up an adult wellness visit physical exam later this fall or winter.

## 2021-06-03 NOTE — PATIENT INSTRUCTIONS - HE
Start trazodone 50 mg every evening approximately 1 hour before bedtime.    Reduce lorazepam down to 0.5 mg every evening, just before bedtime.    If you need additional help sleeping, you can take 3 mg of melatonin.    Follow-up in early January to discuss further increase in trazodone and further reduction in lorazepam.    Do take a fiber supplement on a daily basis.  Start with 1 scoop of Citrucel with a glass of water daily.  Stay well-hydrated.  Avoid certain sugars that can cause gas and bloating, as we discussed today with handout.  Proceed with lactose   intolerance testing as planned.    You could consider using an over-the-counter probiotic.    If you do have significant increasing abdominal pain or fever developing, seek medical attention right away, you would consider repeat abdominal CT scan at that time.    Continue B12 shots as scheduled.

## 2021-06-03 NOTE — TELEPHONE ENCOUNTER
Contact patient.  During her clinic visit, she did bring up multiple medical issues, but 1 of them was some back stiffness and mild kyphotic posture.  We have talked about her going to physical therapy to help her with her back pain and posture.    I did not order that during her visit, but I can place an order for physical therapy if she still wishes to do that to help reduce back pain and improve posture.

## 2021-06-03 NOTE — PROGRESS NOTES
[facility]    Anais CHAD Kelly   69 y.o. female    Date of Visit: 11/12/2019    Chief Complaint   Patient presents with     GI Problem     has seen GI since pain started - loft lower abdominal pain - CT scan of colon was noraml      Other     pt states bones are sore to the touch, how to wean off Ativan      Subjective  Sara, patient prefers not to be called Anais.    Patient is here for follow-up of multiple medical issues.    Patient is a former [facility] patient.  History of chronic anxiety.  She has been taking lorazepam 1 mg every evening for some time.  She has not been on SSRI.    She had been given trazodone in the past but did not try it or fill it.    2 years ago she tried stopping lorazepam cold turkey and try Ambien but had a severe anxiety reaction with panic attack.    She denies depression.    She has not tried melatonin but her  has used it.    Patient has a diagnosis of small fiber peripheral neuropathy diagnosed a number of years ago.  Denies significant alcohol or sleep apnea    We did discuss trying gabapentin at night, but she states her foot burning symptoms are intermittent and fairly mild.  Not every night.  She did not want to add gabapentin at this time.    Diagnosed with B12 deficiency last year, but just mild elevation of MMA and B12 level is 420.  She continues on the monthly B12 shots.    Patient had an abdominoplasty over 5 years ago.  She is had chronic abdominal pain and tightness since then.  No history of bowel obstruction.    She tends to have crampy abdominal pain especially left lower quadrant.    She did have a rectal tear under second child with stitches.    She has to strain some time to evacuate her bowels.  She has trouble relaxing her bowels.  If she does rub her back and side she does tend to be able to relax her pelvic floor better.    She was seen by gastroneurology in October 23 and I did review that note with her today.   She is been referred to the pelvic floor clinic but does not have an appointment until early January.  She did start Citrucel with just 1 teaspoon a day.    She is going to do a lactose intolerance test but has not completed that yet.    She does eat a fair amount of snack food and some fructose-containing type foods.    Last colonoscopy October 2014 was negative plan for 10-year follow-up.  No family history of colon cancer or history of polyps.    I did review the abdominal CT scan from April 2018.  There was diverticulosis but no diverticulitis at that time.  Normal liver.  No AAA.  There was some moderate aortic plaque, however.    She is never smoked.    I did review cholesterol labs from 2017 with an LDL of 153 and HDL 67.  She has not been on a statin.    If she is more mobile with less knee pain for osteoarthritis since cortisone shots in August.    Status post hysterectomy.  She is on daily Premarin.  December 2018 mammogram negative.    Some mild right carpal tunnel symptoms, previous surgery.    Independent living with .  Retired.    She reports yogurt and probiotics have not helped her in the past.  She does occasionally use Bentyl and loperamide.    PMHx:  No past medical history on file.  PSHx:    Past Surgical History:   Procedure Laterality Date     HYSTERECTOMY  1984     IR EXTREMITY ANGIOGRAM LEFT  5/17/2019     OOPHORECTOMY Bilateral 1984     Immunizations:   Immunization History   Administered Date(s) Administered     Influenza, Seasonal, Inj PF IIV3 11/15/1996, 09/29/1997, 07/27/1998, 12/12/2000, 10/01/2002, 10/01/2003, 11/02/2007, 09/09/2009     Influenza, inj, historic,unspecified 10/01/2014     Influenza,seasonal, Inj IIV3 11/10/2006, 10/22/2010, 10/11/2011, 10/09/2012     Pneumo Conj 13-V (2010&after) 06/29/2015     Pneumo Polysac 23-V 04/15/2013     Td, adult adsorbed, PF 03/11/1999     Tdap 01/26/2009     ZOSTER, LIVE 10/16/2009     ZOSTER, RECOMBINANT, IM 07/24/2018, 10/20/2018        ROS A comprehensive review of systems was performed and was otherwise negative    Medications, allergies, and problem list were reviewed and updated    Exam  /80   Pulse 80   Wt 157 lb (71.2 kg)   LMP 07/10/1984   BMI 23.87 kg/m    Appears mildly anxious.  No jaundice.  Heart regular.  Abdomen does show extensive scar tissue and tightness of the abdominal wall consistent with her previous abdominoplasty.  She has some moderate tenderness along the left lower quadrant area, she states is chronic.  There is no guarding or rebound today.  Just mild kyphotic changes of the upper spine.    Assessment/Plan  1. Other insomnia  Chronic insomnia and anxiety.  Goal to wean down and eventually off lorazepam in order to reduce the rebound anxiety side effect.  See patient instructions below.    Unfortunately we were not able to get her a short-term follow-up, therefore I will continue at the current dosing until January, which was my next available appointment.    At that time I would try to get her to reduce the lorazepam further down to 0.25 mg in the evening.  She would have the option increase trazodone to 100 mg.    She can use melatonin as needed.    Her peripheral neuropathy symptoms are intermittent and fairly mild and she did not want to start gabapentin as well but that is an option for additional therapy at night to help her with her chronic insomnia.    If significant spike in anxiety, consider referral to mental health services/psychiatry.    I stressed the importance of regular walking and exercise to help with anxiety and sleep at night.      - traZODone (DESYREL) 50 MG tablet; Take 1 tablet 1 hour before bedtime.  Dispense: 30 tablet; Refill: 4  - LORazepam (ATIVAN) 0.5 MG tablet; Take 1 tablet (0.5 mg total) by mouth at bedtime.  Dispense: 30 tablet; Refill: 1    2. Abdominal pain, left lower quadrant  Chronic abdominal pain with adhesions from previous surgery with abdominoplasty.  She has  diverticulosis and description of chronic constipation.  She describes intermittent loose stools alternating with constipation.    I recommended continuation of the daily fiber supplement, start with 1 teaspoon a day and work up slowly.    She has been referred to the pelvic floor clinic and I suspect she does have some pelvic floor relaxation difficulty after the surgery affecting her bowel frequency and chronic abdominal pain.    She is getting a lactose intolerance evaluation through GI clinic.    I did review a low fermentable oligosaccharides, disaccharides, mono sac rides and poly-awls diet.  She does admit to eating quite a bit of snack foods include sucrose, may benefit from some diet changes to reduce bloating.    She states she is used yogurt and probiotic in the past without benefit she gets diarrhea with yogurt and therefore does not take it.    Routine 10-year colonoscopy for screening will be due October 2024    3. Hx of abdominal surgery  As above    4. Irritable bowel syndrome with diarrhea  As above    5. Idiopathic small fiber sensory neuropathy  Mild and intermittent.  Chronic issue.  History of chronic anxiety.  Denies alcohol or sleep apnea.    Could consider gabapentin in the evening if this became more of an issue.    6. Vitamin B12 deficiency (non anemic)  Not a clear diagnosis, continues on monthly B12 shots at this time.  He feels he gets some energy level improvements with that.    I would consider having her change to a daily oral dose of B12 and B12 shots every 3 to 4 months.    Knee osteoarthritis, had knee cortisone shots in August.    Yearly mammogram due in December.  History of hysterectomy on Premarin.    History of right carpal tunnel surgery.  She is having some mild intermittent symptoms.  If that worsens follow-up for further evaluation.    She still needs to schedule her adult wellness physical exam.,  Plan to do that this winter or spring.    Patient had also mentioned some  kyphotic posture and some back achiness with stiffness.  We did briefly mention her going to physical therapy to improve posture and reduce pain.  If she does wish to have that physical therapy referral, I did send her a phone message and in order to do that.    She did have the flu shot this fall.    She is due for a tetanus shot booster.    Return in about 8 weeks (around 1/6/2020) for Recheck.   Patient Instructions   Start trazodone 50 mg every evening approximately 1 hour before bedtime.    Reduce lorazepam down to 0.5 mg every evening, just before bedtime.    If you need additional help sleeping, you can take 3 mg of melatonin.    Follow-up in early January to discuss further increase in trazodone and further reduction in lorazepam.    Do take a fiber supplement on a daily basis.  Start with 1 scoop of Citrucel with a glass of water daily.  Stay well-hydrated.  Avoid certain sugars that can cause gas and bloating, as we discussed today with handout.  Proceed with lactose   intolerance testing as planned.    You could consider using an over-the-counter probiotic.    If you do have significant increasing abdominal pain or fever developing, seek medical attention right away, you would consider repeat abdominal CT scan at that time.    Continue B12 shots as scheduled.    Lopez Gordon MD        Current Outpatient Medications   Medication Sig Dispense Refill     cholecalciferol, vitamin D3, (VITAMIN D3) 5,000 unit Tab Take 5,000 Units by mouth daily.              dicyclomine (BENTYL) 10 MG capsule Take 10 mg by mouth 2 (two) times a day.              erythromycin ophthalmic ointment Administer 1 application to both eyes at bedtime.       estrogens, conjugated, (PREMARIN) 0.625 MG tablet Take 1 tablet (0.625 mg total) by mouth daily. 90 tablet 3     loperamide (IMODIUM) 2 mg capsule Take 2 tablets (4 mg) by mouth after 1st loose stool and 1 tablet (2 mg) after each next bowel movement. Do not exceed 16 mg in 24  "hours.  6     LORazepam (ATIVAN) 0.5 MG tablet Take 1 tablet (0.5 mg total) by mouth at bedtime. 30 tablet 1     multivitamin therapeutic tablet Take 1 tablet by mouth daily.       omeprazole (PRILOSEC) 20 MG capsule Take 20 mg by mouth daily before breakfast.       RESTASIS 0.05 % ophthalmic emulsion TAKE 1 DROP(S) IN BOTH EYES daily  3     traZODone (DESYREL) 50 MG tablet Take 1 tablet 1 hour before bedtime. 30 tablet 4     Current Facility-Administered Medications   Medication Dose Route Frequency Provider Last Rate Last Dose     cyanocobalamin injection 1,000 mcg  1,000 mcg Intramuscular Q30 Days Lan Perkins MD   1,000 mcg at 10/21/19 1344     Allergies   Allergen Reactions     Acetaminophen Nausea Only     Amoxicillin Diarrhea     Celebrex [Celecoxib] Other (See Comments)     \"stomach upset\"     Onion      Vicodin [Hydrocodone-Acetaminophen] Nausea Only     Zolpidem Tartrate      Codeine Rash     Diphenhydramine Anxiety     hyper     Zolpidem Anxiety and Palpitations     Social History     Tobacco Use     Smoking status: Never Smoker     Smokeless tobacco: Never Used   Substance Use Topics     Alcohol use: Yes     Comment: 1 a month     Drug use: No           "

## 2021-06-04 NOTE — PATIENT INSTRUCTIONS - HE
Reduce the lorazepam down to 0.25 mg in the evening, by cutting lorazepam pills in half.  While this is a very low dose of lorazepam will help to reduce the withdrawal effects.    You can increase trazodone up to 100 mg in the evening if you have problems sleeping on the 50 mg dose of trazodone.    Walk on a daily basis.    Follow-up later this month for physical exam with fasting labs.

## 2021-06-04 NOTE — PROGRESS NOTES
[facility] clinic Follow Up Note    Anais Kelly   69 y.o. female    Date of Visit: 1/6/2020    Chief Complaint   Patient presents with     Follow-up     Med check for treatment of insomnia and anxiety     Subjective  Saundra, does not want to be called Maranda.    She is here for follow-up on her chronic insomnia and anxiety.  She had been on 1 mg of lorazepam for a number of years.  I had weaned her down to 0.5 mg a day which she is at now.    She states she falls asleep well at night.  She is been getting up earlier in the morning about 5 in the morning but feels she gets an adequate night sleep.    She has some mild sedation feeling in the middle the night if she gets up to go to the bathroom but no falls.    She does have some mild small fiber neuropathy but no foot sores.  No history of sleep apnea or alcohol.  She has not wanted to use gabapentin.    She does do monthly B12 shots although last year she just had a mild increased MMA level and B12 level of 420.    No flare of anxiety and she denies depression.  Patient has been proceeding with weaning off the lorazepam.    She had a previous abdominal plasty over 5 years ago.  She has chronic abdominal pain and tightness.  No history of small bowel obstruction.  She has some crampy abdominal pain.  History of rectal tear with childbirth many years ago.    Possible problems with pelvic floor relaxation.  She has been taking the Citrucel daily and she does feel that helps some.    We discussed reducing fermentable oligosaccharides previously.  She does not like yogurt or probiotics.    Colonoscopy October 2014 was negative with 10-year follow-up.    April 2018 abdominal CT scan showed diverticulosis but no AAA and normal liver.  There was some aortic plaque of moderate severity noted.    Status post hysterectomy on Premarin.  Has a mammogram scheduled for January.    She took a [employer] earlier this fall and cause significant abdominal cramping and  discomfort but that is resolved.  Her dental issue and sinusitis is resolved.    No chest pain or chest pressure or palpitations.  She is never smoked.  2017 HDL 67 and .  Has not been on a statin.    Knee osteoarthritis with cortisone shot in August.  She also do cortisone shot last week.  Just some mild to moderate achiness.  Not taking NSAIDs.    Her blood pressure has been normal in the past.  She has not been on blood pressure medication.    No complaints of increased right carpal tunnel symptoms.  Previous surgery.        PMHx:  No past medical history on file.  PSHx:    Past Surgical History:   Procedure Laterality Date     HYSTERECTOMY  1984     IR EXTREMITY ANGIOGRAM LEFT  5/17/2019     OOPHORECTOMY Bilateral 1984     Immunizations:   Immunization History   Administered Date(s) Administered     Influenza high dose,seasonal,PF, 65+ yrs 10/22/2019     Influenza, Seasonal, Inj PF IIV3 11/15/1996, 09/29/1997, 07/27/1998, 12/12/2000, 10/01/2002, 10/01/2003, 11/02/2007, 09/09/2009     Influenza, inj, historic,unspecified 10/01/2014     Influenza,seasonal, Inj IIV3 11/10/2006, 10/22/2010, 10/11/2011, 10/09/2012     Pneumo Conj 13-V (2010&after) 06/29/2015     Pneumo Polysac 23-V 04/15/2013     Td, adult adsorbed, PF 03/11/1999     Tdap 01/26/2009     ZOSTER, LIVE 10/16/2009     ZOSTER, RECOMBINANT, IM 07/24/2018, 10/20/2018       ROS A comprehensive review of systems was performed and was otherwise negative    Medications, allergies, and problem list were reviewed and updated    Exam  /86 (Patient Site: Right Arm, Patient Position: Sitting, Cuff Size: Adult Large)   Pulse 76   Wt 153 lb (69.4 kg)   LMP 07/10/1984   BMI 23.26 kg/m    Alert and oriented.  Normal mood and affect.  Lungs are clear.  Heart is regular without murmur.  No edema.    Blood pressure rechecked by me was 118/80    Assessment/Plan  1. Other insomnia  She has been able to wean down on her lorazepam.  Reduce lorazepam further  down to 0.25 mg in the evening and follow-up later this month to determine if she can wean completely off the lorazepam at that time.    Increase trazodone if needed.    I recommended daily walking and exercise to improve sleep at night.    No history of sleep apnea documented.  Denies alcohol.      - traZODone (DESYREL) 50 MG tablet; Take 1 tablet or 2 tablets 1 hour before bedtime.  Dispense: 60 tablet; Refill: 4  - LORazepam (ATIVAN) 0.5 MG tablet; Take 0.5 tablets (0.25 mg total) by mouth at bedtime.  Dispense: 15 tablet; Refill: 0    2. Small fiber neuropathy (H); diagnosed by neurology  No clear etiology.  Mild.  No foot sores.  No falls.  She has not wanted to use gabapentin at night.    3. Vitamin B12 deficiency (non anemic)  B12 shot today.  Is on a monthly plan.  Could consider change to every 3-month injections and oral B12 supplement.    4. Hx of abdominal surgery  Chronic abdominal pain and tightness.  Continue Citrucel daily.  History of diverticulosis.    Some possible probably pelvic floor relaxation.    10-year colonoscopy due October 2024.    Knee osteoarthritis with recent cortisone shots.    Hysterectomy.  On Premarin.  Mammogram later this month.    No complaints for right carpal tunnel.    Return in 22 days (on 1/28/2020) for Annual physical.   Patient Instructions   Reduce the lorazepam down to 0.25 mg in the evening, by cutting lorazepam pills in half.  While this is a very low dose of lorazepam will help to reduce the withdrawal effects.    You can increase trazodone up to 100 mg in the evening if you have problems sleeping on the 50 mg dose of trazodone.    Walk on a daily basis.    Follow-up later this month for physical exam with fasting labs.    Lopez Gordon MD        Current Outpatient Medications   Medication Sig Dispense Refill     cholecalciferol, vitamin D3, (VITAMIN D3) 5,000 unit Tab Take 5,000 Units by mouth daily.              dicyclomine (BENTYL) 10 MG capsule Take 10 mg by  "mouth 2 (two) times a day.              erythromycin ophthalmic ointment Administer 1 application to both eyes at bedtime.       loperamide (IMODIUM) 2 mg capsule Take 2 tablets (4 mg) by mouth after 1st loose stool and 1 tablet (2 mg) after each next bowel movement. Do not exceed 16 mg in 24 hours.  6     LORazepam (ATIVAN) 0.5 MG tablet Take 0.5 tablets (0.25 mg total) by mouth at bedtime. 15 tablet 0     multivitamin therapeutic tablet Take 1 tablet by mouth daily.       omeprazole (PRILOSEC) 20 MG capsule Take 20 mg by mouth daily before breakfast.       PREMARIN 0.625 mg tablet TAKE 1 TABLET BY MOUTH ONCE DAILY 90 tablet 0     RESTASIS 0.05 % ophthalmic emulsion TAKE 1 DROP(S) IN BOTH EYES daily  3     traZODone (DESYREL) 50 MG tablet Take 1 tablet or 2 tablets 1 hour before bedtime. 60 tablet 4     Current Facility-Administered Medications   Medication Dose Route Frequency Provider Last Rate Last Dose     cyanocobalamin injection 1,000 mcg  1,000 mcg Intramuscular Q30 Days Lan Perkins MD   1,000 mcg at 11/25/19 1340     Allergies   Allergen Reactions     Acetaminophen Nausea Only     Amoxicillin Diarrhea     Celebrex [Celecoxib] Other (See Comments)     \"stomach upset\"     Onion      Vicodin [Hydrocodone-Acetaminophen] Nausea Only     Zolpidem Tartrate      Codeine Rash     Diphenhydramine Anxiety     hyper     Zolpidem Anxiety and Palpitations     Social History     Tobacco Use     Smoking status: Never Smoker     Smokeless tobacco: Never Used   Substance Use Topics     Alcohol use: Yes     Comment: 1 a month     Drug use: No           "

## 2021-06-04 NOTE — TELEPHONE ENCOUNTER
Refill Approved    Rx renewed per Medication Renewal Policy. Medication was last renewed on 12/12/18.    Penny Finch, [facility] Triage/Med Refill 12/17/2019     Requested Prescriptions   Pending Prescriptions Disp Refills     PREMARIN 0.625 mg tablet [Pharmacy Med Name: PREMARIN 0.625MG    TAB] 90 tablet 0     Sig: TAKE 1 TABLET BY MOUTH ONCE DAILY       Hormone Replacement Therapy Refill Protocol Passed - 12/16/2019  5:32 AM        Passed - PCP or prescribing provider visit in past 12 months       Last office visit with prescriber/PCP: 12/12/2018 Shani Barker MD OR same dept: 11/12/2019 Lopez Gordon MD OR same specialty: 11/12/2019 Lopez Gordon MD  Last physical: Visit date not found Last MTM visit: Visit date not found     Next visit within 3 mo: Visit date not found  Next physical within 3 mo: Visit date not found  Prescriber OR PCP: Shani Barker MD  Last diagnosis associated with med order: There are no diagnoses linked to this encounter.   If protocol passes may refill for 3 months.

## 2021-06-05 NOTE — PROGRESS NOTES
[facility] Daily Progress     Patient Name: Anais Kelly  Date: 2020  Visit #: 3/12  Date of POC: 20-20  Referral Diagnosis: Chronic bilateral thoracic back pain  Referring provider: Lopez Gordon MD  Visit Diagnosis:     ICD-10-CM    1. Pain in joint of right shoulder M25.511    2. Thoracic spine pain M54.6    3. Cervicalgia M54.2    4. Tension headache G44.209    5. Myofascial pain M79.18          Assessment:     There has been an excellent increase in her ROM from evaluation. She did feel better today as well after treatment.   Patient is benefitting from skilled physical therapy and is making steady progress toward functional goals.  Patient is appropriate to continue with skilled physical therapy intervention, as indicated by initial plan of care.    Goal Status:  Pt. will demonstrate/verbalize independence in self-management of condition in : 12 weeks  Pt. will report decreased intensity, frequency of : Pain;Headache;in 12 weeks;Comment  Comment:: decrease pain from 2-5/10 to 0-3/10 with ADL and HA frequency to <2x/week  Patient Turn Head: for driving;for watching traffic;with full ROM;with less difficulty;in 12 weeks;Comment  Comment: increase C-rot to >60 deg B  Patient will reach / maintain arm movement: forward;overhead;behind;for home chores;for dressing;for other activity;with less difficulty;in 12 weeks  for other activity: curling her hair    Patient will decrease : NDI score;by _ points;for improved quality of function;for improved quality of life;in 12 weeks  by ___ points: 10      Plan / Patient Education:     Per POC    Subjective:     Pain Ratin  She is still able to move her arm quite a bit better.   The exercises are going good. She doesn't have any questions     Objective:     Patient Active Problem List   Diagnosis     Irritable bowel syndrome with diarrhea     Benign essential hypertension     Insomnia     Small fiber neuropathy (H); diagnosed by  neurology     Abdominal pain, chronic, bilateral lower quadrant     Controlled substance agreement signed 5/9/19- Ativan 1mg for Insomnia     Hormone replacement therapy, postmenopausal     Ischemia of finger     LV, art fascial tensions.     R shoulder abduction: 162 deg  R shoulder flexion: 164 deg    Treatment Today     TREATMENT MINUTES COMMENTS   Evaluation     Self-care/ Home management     Manual therapy 25 Fascial release using Strain-Counterstrain of B cervcial/scap/sternal/R shoulder-LV, B lower cervical-Art,    Neuromuscular Re-education     Therapeutic Activity     Therapeutic Exercises 15 AA/PROM to BUE, C_T spine ribs   Gait training     Modality__________________                Total 30    Blank areas are intentional and mean the treatment did not include these items.       Bennie Hernandez, PT  2/5/2020

## 2021-06-05 NOTE — TELEPHONE ENCOUNTER
----- Message from Louis Lang sent at 1/27/2020  5:54 PM CST -----  Regarding: Medicare/Medicaid Certification Request  Good evening Dr. Gordon,    One of our Physical Therapists sent you a Certification that we need signed about a week ago and we haven't gotten it back from you yet.  If you could please take care of that ASAP, that'd be greatly appreciated.  Please let me know should you have any questions or concerns.    Thank you,    Louis Lang  Patient    [facility]

## 2021-06-05 NOTE — TELEPHONE ENCOUNTER
Contact patient about her cholesterol.  A lab result letter will be mailed to her, but the message about the cholesterol was as follows:    Your total cholesterol is moderately high, but you have a very high HDL, or good cholesterol, which can be protective.  I would not recommend a cholesterol medication for you at this time.  But, if you wish to screen for evidence of coronary artery disease, you could consider a cardiac CT calcium score scan.  If your coronary arteries showed evidence of calcium, which can be a marker for cholesterol plaque, you may benefit from a cholesterol medication.  Let me know if you wish to discuss cholesterol or consider a cardiac CT scan this year.  Otherwise, follow-up in 1 year for recheck.  A Mediterranean, heart-healthy diet is recommended.    Ask patient if she wishes to proceed with a cardiac CT scan for calcium score.

## 2021-06-05 NOTE — TELEPHONE ENCOUNTER
Per last note, a cardiac CT scan was not planned. Order was already in for a DXA and patient should have been called already. Please advise regarding the cardiac CT scan request. Will relay what you have to say along with giving the patient the radiology scheduling # ([phone]).      Marcella Mcguire CMA  2:21 PM  2/3/2020

## 2021-06-05 NOTE — PROGRESS NOTES
[facility] Certification Request    January 22, 2020      Patient: Anais Kelly  MR Number: [id number]  YOB: 1950  Date of Visit: 1/22/2020      Dear Dr. Gordon, Lopez BARRAGAN MD:    Thank you for this referral.   We are seeing Anais Kelly for Physical Therapy of thoracic/shoulder/neck pain.    Medicare and/or Medicaid requires physician review and approval of the treatment plan. Please review the plan of care and verify that you agree with the therapy plan of care by co-signing this note.      Plan of Care  Authorization / Certification Start Date: 01/22/20  Authorization / Certification End Date: 04/22/20  Communication with: Referral Source  Patient Related Instruction: Nature of Condition;Self Care instruction;Treatment plan and rationale;Basis of treatment;Body mechanics;Posture;Precautions;Next steps;Expected outcome  Times per Week: 1  Number of Weeks: 12  Number of Visits: 12  Discharge Planning: discharge with [facility] once goals are met  Therapeutic Exercise: ROM;Stretching;Strengthening  Neuromuscular Reeducation: kinesio tape;posture;balance/proprioception;TNE;core  Manual Therapy: soft tissue mobilization;myofascial release;muscle energy;strain counterstrain;joint mobilization      Goals:  Pt. will demonstrate/verbalize independence in self-management of condition in : 12 weeks  Pt. will report decreased intensity, frequency of : Pain;Headache;in 12 weeks;Comment  Comment:: decrease pain from 2-5/10 to 0-3/10 with ADL and HA frequency to <2x/week  Patient Turn Head: for driving;for watching traffic;with full ROM;with less difficulty;in 12 weeks;Comment  Comment: increase C-rot to >60 deg B  Patient will reach / maintain arm movement: forward;overhead;behind;for home chores;for dressing;for other activity;with less difficulty;in 12 weeks  for other activity: curling her hair    Patient will decrease : NDI score;by _ points;for improved quality of function;for improved quality  of life;in 12 weeks  by ___ points: 10        If you have any questions or concerns, please don't hesitate to call.    Sincerely,      Bennie Hernandez PT, ATC        Physician recommendation:     ___ Follow therapist's recommendation        ___ Modify therapy      *Physician co-signature indicates they certify the need for these services furnished within this plan and while under their care.       [facility]   Shoulder Initial Evaluation    Patient Name: Anais Kelly  Date of evaluation: 1/22/2020  Referral Diagnosis: thoracic/shoulder/neck pain  Referring provider: Lopez Gordon MD  Visit Diagnosis:     ICD-10-CM    1. Pain in joint of right shoulder M25.511    2. Thoracic spine pain M54.6    3. Cervicalgia M54.2    4. Tension headache G44.209    5. Myofascial pain M79.18        Assessment:      Anais Kelly is a 69 y.o. female who presents to PT today with thoracic/shoulder/neck pain which can cause some headaches. She is limited with IADL function, reaching, dressing, lifting due to her pain and immobility. She would benefit from scapular stabilization and therapies to address her fascial hypomobilities. She is appropriate for skilled PT to allow her to reach all stated goals.     Shoulder abduction: 160 deg B after treatment.     Goals:  Pt. will demonstrate/verbalize independence in self-management of condition in : 12 weeks  Pt. will report decreased intensity, frequency of : Pain;Headache;in 12 weeks;Comment  Comment:: decrease pain from 2-5/10 to 0-3/10 with ADL and HA frequency to <2x/week  Patient Turn Head: for driving;for watching traffic;with full ROM;with less difficulty;in 12 weeks;Comment  Comment: increase C-rot to >60 deg B  Patient will reach / maintain arm movement: forward;overhead;behind;for home chores;for dressing;for other activity;with less difficulty;in 12 weeks  for other activity: curling her hair    Patient will decrease : NDI score;by _ points;for improved  "quality of function;for improved quality of life;in 12 weeks  by ___ points: 10      Patient's expectations/goals are realistic    Barriers to Learning or Achieving Goals:  Chronicity of the problem.       Plan / Patient Instructions:        Plan of Care:   Authorization / Certification Start Date: 01/22/20  Authorization / Certification End Date: 04/22/20  Communication with: Referral Source  Patient Related Instruction: Nature of Condition;Self Care instruction;Treatment plan and rationale;Basis of treatment;Body mechanics;Posture;Precautions;Next steps;Expected outcome  Times per Week: 1  Number of Weeks: 12  Number of Visits: 12  Discharge Planning: discharge with [facility] once goals are met  Therapeutic Exercise: ROM;Stretching;Strengthening  Neuromuscular Reeducation: kinesio tape;posture;balance/proprioception;TNE;core  Manual Therapy: soft tissue mobilization;myofascial release;muscle energy;strain counterstrain;joint mobilization        Plan for next visit: Plan to con't with manual therapy to decrease fascial tension/tone to normalize ROM/decrease inflammation/decrease mm tone and improve proprioception.  Progress HEP as tolerated.   .   Subjective:       Social information:   Occupation:retired   Work Status:NA      History of Present Illness:    Anais is a 69 y.o. female who presents to therapy today with complaints of posture issues, shoulder pains/thoracic pain and neck pain on the L.  This has been an ongoing issue but recently saw her MD for this referral (1/6/2020).   There is also some pain up in to her neck that has been there for a logn time. There are occasional HA's (couple times a week) that are more on the front and L side of her head.   Function: difficulty reaching up with the R shoulder, sleeping on her shoulders is difficult, difficulty turning her head L will give L neck pain, sitting can irritate her upper back/neck, cannot curl her hair.   She does have some tingling and \"going to " "sleep\" in her fingers after doing too much with her hands. She is wondering if it might be neuropathy.   Symptoms are getting worse.  She describes their previous level of function as not limited  It seems to feel the best after a massage which is probably every 6 weeks.   She has had rotator cuff repair on her R shoulder in ~- (?).     Pain Ratin  Pain rating at best: 2  Pain rating at worst: 5  Pain description: aching, dull and pain    Patient reports benefit from:  massage       Objective:      Note: Items left blank indicates the item was not performed or not indicated at the time of the evaluation.    Patient Outcome Measures :    Neck Disability Score in %: 22     Scores range from 0-100%, where a score of 0% represents minimal pain and maximal function. The minmal clinically important difference is a score reduction of 10%.    Shoulder Examination  1. Pain in joint of right shoulder     2. Thoracic spine pain     3. Cervicalgia     4. Tension headache     5. Myofascial pain       Precautions/Restrictions: None  Involved side: Bilateral  Posture Observation:    Standing: FHP, rounded shoulders, increased kyphosis.     Cervical Clearing: C-rot: 52/37 (Pain L)   Thoracic rotation: 37/42    Shoulder/Elbow ROM  2020   Date:      Shoulder and Elbow ROM ( )   AROM in degrees AROM in degrees AROM in degrees    Right Left Right Left Right Left   Shoulder Flexion (0-180 ) 143 155       Shoulder Abduction (0-180 ) 101 130       Shoulder Extension (0-60 )         Shoulder ER (0-90 ) T1 T4       Shoulder IR (0-70 ) T12 T6       Shoulder IR/Ext         Elbow Flexion (150 )         Elbow Extension (0 )          PROM in degrees PROM in degrees PROM in degrees    Right Left Right Left Right Left   Shoulder Flexion (0-180 )         Shoulder Abduction (0-180 )         Shoulder Extension (0-60 )         Shoulder ER (0-90 ) 60 50       Shoulder IR (0-70 )         Elbow Flexion (150 )         Elbow Extension (0 )    "        Shoulder/Elbow Strength 1/22/2020   Date:      Shoulder/Elbow Strength (/5)  Manual Muscle Test (MMT) MMT MMT MMT    Right Left Right Left Right Left   Shoulder Flexion 4- 4-       Supraspinatus 3+ 3+       Shoulder Abduction 4 4       Shoulder Extension         Shoulder External Rotation 4- 4-       Shoulder Internal Rotation 4- 4-       Elbow Flexion 4+ 4+       Elbow Extension 4 4       Other:         Other:           Flexibility:     Palpation: Hypomobile fascia of UE's, spine, thorax. Increased tone of upper traps. Crepitus noted in B GHJ with active motion.     Shoulder Special Tests  1/22/2020   Impingement Cluster Right (+/-) Left (+/-) Rotator Cuff Tests Right (+/-) Left (+/-)   Eliane-Duy neg  Drop Arm Sign     Painful Arc neg  Hornblowers     Neer's  neg  ERLS     AC Tests Right (+/-) Left (+/-) IRLS     Active Compression   Labral Tests Right (+/-) Left (+/-)   Crossbody Adduction   Biceps Load Test II     AC Resisted Extension   Jerk Test     GH Instability Tests Right (+/-) Left (+/-) Nell Test     Sulcus Sign   Biceps Tests Right (+/-) Left (+/-)   Apprehension   Speed     Relocation   Jolly irvin     Other:   Other:     Other:   Other:         Treatment Today  1/22/2020   TREATMENT MINUTES COMMENTS   Evaluation 20 low   Self-care/ Home management     Manual therapy 30 Fascial release using Strain-Counterstrain of B CT LF-MS, B UEC6-7-MS   Neuromuscular Re-education     Therapeutic Activity     Therapeutic Exercises     Gait training     Modality__________________                Total 50    Blank areas are intentional and mean the treatment did not include these items.       PT Evaluation Code: (Please list factors)  Patient History/Comorbidities: 1-2  Examination: 1-2  Clinical Presentation: stable  Clinical Decision Making: low    Patient History/  Comorbidities Examination  (body structures and functions, activity limitations, and/or participation restrictions) Clinical Presentation  Clinical Decision Making (Complexity)   No documented Comorbidities or personal factors 1-2 Elements Stable and/or uncomplicated Low   1-2 documented comorbidities or personal factor 3 Elements Evolving clinical presentation with changing characteristics Moderate   3-4 documented comorbidities or personal factors 4 or more Unstable and unpredictable High              Bennie Hernandez PT, ATC  1/22/2020  3:09 PM

## 2021-06-05 NOTE — TELEPHONE ENCOUNTER
Can you please fax this again to us? [phone].  Angelika Merlos CMA ............... 7:54 AM, 01/28/20

## 2021-06-05 NOTE — PATIENT INSTRUCTIONS - HE
Increase regular walking.  Goal for 20 minutes of walking at least 3 times a week.    Try not to take any more lorazepam.  You can keep it on hand in case you have a bad insomnia at night and wish to take a half a pill in the middle of the night.  You can continue on the trazodone.  You can adjust the trazodone, if you feel it is too much.    Send us your healthcare directive to scan into the chart.    You will be contacted to set up your DEXA bone density test.  I will mail you the results.    You are due for a tetanus shot, Td, you can get that at local pharmacy.  You can request to have that here at our clinic, if insurance covers.    Get your next B12 shot, but after that you can change to every 3 months.  I would recommend over-the-counter 1000 mcg oral B12 tablet daily.    Check blood pressure occasionally.  If blood pressure is running above 135/85, make appointment to see me to discuss.    Otherwise, I can see you in 1 year for an adult wellness visit physical.        Advance Directive  Patient s advance directive was discussed and I am comfortable with the patient s wishes.  Patient Education   Personalized Prevention Plan  You are due for the preventive services outlined below.  Your care team is available to assist you in scheduling these services.  If you have already completed any of these items, please share that information with your care team to update in your medical record.  Health Maintenance   Topic Date Due     HEPATITIS C SCREENING  1950     ADVANCE CARE PLANNING  02/20/1966     MEDICARE ANNUAL WELLNESS VISIT  02/20/2013     TD 18+ HE  01/26/2019     DXA SCAN  12/22/2019     FALL RISK ASSESSMENT  05/24/2020     MAMMOGRAM  01/14/2022     LIPID  12/01/2022     COLONOSCOPY  10/31/2024     PNEUMOCOCCAL IMMUNIZATION 65+ LOW/MEDIUM RISK  Completed     INFLUENZA VACCINE RULE BASED  Completed     ZOSTER VACCINES  Completed

## 2021-06-05 NOTE — PROGRESS NOTES
"[facility] Rehabilitation Daily Progress     Patient Name: Anais Kelly  Date: 2020  Visit #:   Date of POC: 20-20  Referral Diagnosis: Chronic bilateral thoracic back pain  Referring provider: Lopez Gordon MD  Visit Diagnosis:     ICD-10-CM    1. Pain in joint of right shoulder M25.511    2. Thoracic spine pain M54.6    3. Cervicalgia M54.2    4. Tension headache G44.209    5. Myofascial pain M79.18          Assessment:   Patient felt the stretches were helpful and liked the new strengthening exercises.  The \"wall dionna\" will help re-train the shoulder and scapular muscles to work in the correct plane as motion continues to improve  Patient is benefitting from skilled physical therapy and is making steady progress toward functional goals.  Patient is appropriate to continue with skilled physical therapy intervention, as indicated by initial plan of care.    Goal Status:  Pt. will demonstrate/verbalize independence in self-management of condition in : 12 weeks  Pt. will report decreased intensity, frequency of : Pain;Headache;in 12 weeks;Comment  Comment:: decrease pain from 2-5/10 to 0-3/10 with ADL and HA frequency to <2x/week  Patient Turn Head: for driving;for watching traffic;with full ROM;with less difficulty;in 12 weeks;Comment  Comment: increase C-rot to >60 deg B  Patient will reach / maintain arm movement: forward;overhead;behind;for home chores;for dressing;for other activity;with less difficulty;in 12 weeks  for other activity: curling her hair    Patient will decrease : NDI score;by _ points;for improved quality of function;for improved quality of life;in 12 weeks  by ___ points: 10      Plan / Patient Education:     Per POC    Subjective:     Pain Ratin    My right shoulder feels much improved after the last treatment  I am able to move my shoulder much easier but my neck makes cracking noises      Objective:     Patient Active Problem List   Diagnosis     Irritable " "bowel syndrome with diarrhea     Benign essential hypertension     Insomnia     Small fiber neuropathy (H); diagnosed by neurology     Abdominal pain, chronic, bilateral lower quadrant     Controlled substance agreement signed 5/9/19- Ativan 1mg for Insomnia     Hormone replacement therapy, postmenopausal     Ischemia of finger     Exercises:  Exercise #1: stretches:  wall or standing shoulder flexion and abduction  Comment #1:     hold 30 seconds X 1-3 reps  Exercise #2: supine:  \"wall dionna\"  arms of snow dionna.  After 1-2 weeks progress to standing  X 20  Comment #2: reverse wall push-up   hold 10 seconds X 6-12 reps      Treatment Today     TREATMENT MINUTES COMMENTS   Evaluation     Self-care/ Home management     Manual therapy     Neuromuscular Re-education     Therapeutic Activity     Therapeutic Exercises 30 See above or flow sheet, many questions   Gait training     Modality__________________                Total 30    Blank areas are intentional and mean the treatment did not include these items.       Ariadne Koenig, PT  1/27/2020    "

## 2021-06-05 NOTE — TELEPHONE ENCOUNTER
Orders being requested: Cardiac CT calcium score scan  Reason service is needed/diagnosis:  Coronary Artery Disease   When are orders needed by:   [facility] 2/5/19   Where to send Orders: Epic & call patient to schedule   Okay to leave detailed message?  Yes          Orders being requested:  Bone density   Reason service is needed/diagnosis:  3 year follow up    When are orders needed by:  [facility] 2/5/20  Where to send Orders: EPIC & call patient to help schedule   Okay to leave detailed message?  Yes

## 2021-06-05 NOTE — PROGRESS NOTES
Assessment and Plan:   Patient instructions:  Increase regular walking.  Goal for 20 minutes of walking at least 3 times a week.    Try not to take any more lorazepam.  You can keep it on hand in case you have a bad insomnia at night and wish to take a half a pill in the middle of the night.  You can continue on the trazodone.  You can adjust the trazodone, if you feel it is too much.    Send us your healthcare directive to scan into the chart.    You will be contacted to set up your DEXA bone density test.  I will mail you the results.    You are due for a tetanus shot, Td, you can get that at local pharmacy.  You can request to have that here at our clinic, if insurance covers.    Get your next B12 shot, but after that you can change to every 3 months.  I would recommend over-the-counter 1000 mcg oral B12 tablet daily.    Check blood pressure occasionally.  If blood pressure is running above 135/85, make appointment to see me to discuss.    Otherwise, I can see you in 1 year for an adult wellness visit physical.      1. Healthcare maintenance  Relatively low cardiovascular risk profile.  History of high HDL, not planning statin or cardiac CT scan.    I will have her monitor blood pressure to see if it is elevated but it was normal today.    I asked her to increase regularity of walking.  She can walk with a friend at the [employer] sports center.    Routine eye exams.    She is due for a tetanus shot and was reminded to get that at local pharmacy.    Status post hysterectomy, on Premarin for hot flash control.  I did review the January 2020 mammogram which was negative today.  Continue yearly mammograms.    Full code, she does have a health care directive and she has discussed that with her family.  Full code.    Colonoscopy October 2014-.  No family history of colon cancer.  10-year follow-up plan.  Bowels are regular without blood in stool currently.    2. Hypercholesterolemia  Relatively low cardiovascular  risk.  Mediterranean diet and increase regular walking.  Not anticipating statin.  - Lipid Cascade    She did have a CT scan of her abdomen April 2018.  There was no AAA.  There was some aortic plaque noted.  She has never smoked.  She denies family history of coronary disease.    3. Vitamin B12 deficiency (non anemic)  She has had some mild small fiber neuropathy.  She was started on B12 shots in 2018 but just borderline MMA level, not clearly full vitamin B12 deficiency.    She is been getting monthly B12 shots, she will get her shot in February but after that she can change to every 3-month injections and she can start a daily oral 1000 mcg B12 supplement.    4. Small fiber neuropathy (H); diagnosed by neurology  Mild.  No history of sleep apnea or alcohol.  No foot sores.  No falls.    5. Insomnia, unspecified type  History of chronic anxiety.  She is weaned down from a milligram of lorazepam at night down to 0.25 mg in the evening.  Gets to sleep okay.  Usually wakes up once at night but able to fall back asleep.  The medication feeling from the trazodone does wear off by the morning.    At this time she will stop lorazepam, but I did give her 15 more tablets to use as an emergency as needed basis over the next year in case she does have a severe insomnia spell.  If she does end up using these more regularly or has more sustained anxiety exacerbation, I will asked patient to follow-up in clinic to discuss.    If she does wish to reduce the trazodone down to 25 mg in the evening, she can do that.    6. History of hysterectomy  Just slight stress incontinence when getting up at night.  No change in symptoms.  She does wish to continue on the Premarin daily.  No history of DVT.    Yearly mammogram earlier this month negative.  Breast exam today negative.    7. Primary osteoarthritis of both knees  Chronic DJD.  Had recent cortisone shots.  Minimal pain and not taking NSAIDs.  Continue regular walking.    8.  Encounter for therapeutic drug monitoring    - Comprehensive Metabolic Panel  - HM2(CBC w/o Differential)    9. Postmenopausal status  Return for discussion, if full osteoporosis.  Otherwise continue calcium and vitamin D and regular walking.  - DXA Bone Density Scan; Future    10. Other insomnia  As above  - LORazepam (ATIVAN) 0.5 MG tablet; 1/2 tablet at night, only if needed for significant insomnia.  Dispense: 15 tablet; Refill: 0    11. Routine general medical examination at a health care facility  No flare of her right carpal tunnel symptoms post carpal tunnel release surgery.  Just occasional paresthesias when writing [place] cards.    She has had abdominal tightness ever since an abdominal plasty over 5 years ago.  Chronic irritable bowel managed with [employer].  Problems with pelvic floor relaxation but stable.  She does not like yogurt or probiotic.    Right shoulder DJD significantly better after physical therapy.  Now is full range of motion.    The patient's current medical problems were reviewed.    I have had an Advance Directives discussion with the patient.  The following health maintenance schedule was reviewed with the patient and provided in printed form in the after visit summary:   Health Maintenance   Topic Date Due     HEPATITIS C SCREENING  1950     ADVANCE CARE PLANNING  02/20/1966     MEDICARE ANNUAL WELLNESS VISIT  02/20/2013     TD 18+ HE  01/26/2019     DXA SCAN  12/22/2019     FALL RISK ASSESSMENT  05/24/2020     MAMMOGRAM  01/14/2022     LIPID  12/01/2022     COLONOSCOPY  10/31/2024     PNEUMOCOCCAL IMMUNIZATION 65+ LOW/MEDIUM RISK  Completed     INFLUENZA VACCINE RULE BASED  Completed     ZOSTER VACCINES  Completed        Subjective:   Chief Complaint: Anais Kelly is an 69 y.o. female here for an Annual Wellness visit.   HPI: This independently with .  Goes by [place].    Still good exercise tolerance but does not walk on a fairly regular basis.  No chest pain or  cardiac symptoms.  No palpitations or history of arrhythmia.  No daytime sleepiness or suspected sleep apnea.    She has not had significant lower extremity edema.    Her abdominal symptoms are stable.  No leg claudication.    Been over 5 years and she is had a DEXA scan.  No fracture history.    She has successfully weaned down on her lorazepam to just 0.25 mg in the evening with trazodone 50 mg, see above for discussion.    Diet is fairly good, eats vegetables regularly.    No flare of anxiety or dysthymia.  Memory is been good.    Review of Systems: No falls.  No skin changes or history of skin cancer.  Please see above.  The rest of the review of systems are negative for all systems.    Patient Care Team:  Lopez Gordon MD as PCP - General (Internal Medicine)  Eve Contreras MD as Assigned PCP  Lissy Ortega, PharmD as Pharmacist (Pharmacist)  Lopez Gordon MD as Assigned PCP     Patient Active Problem List   Diagnosis     Irritable bowel syndrome with diarrhea     Benign essential hypertension     Insomnia     Small fiber neuropathy (H); diagnosed by neurology     Abdominal pain, chronic, bilateral lower quadrant     Controlled substance agreement signed 5/9/19- Ativan 1mg for Insomnia     Hormone replacement therapy, postmenopausal     Ischemia of finger     No past medical history on file.   Past Surgical History:   Procedure Laterality Date     HYSTERECTOMY  1984     IR EXTREMITY ANGIOGRAM LEFT  5/17/2019     OOPHORECTOMY Bilateral 1984      Family History   Problem Relation Age of Onset     Cancer Father 72        Bladder Cancer     Multiple sclerosis Mother      No Medical Problems Brother       Social History     Socioeconomic History     Marital status:      Spouse name: Not on file     Number of children: Not on file     Years of education: Not on file     Highest education level: Not on file   Occupational History     Not on file   Social Needs     Financial resource strain: Not on  file     Food insecurity:     Worry: Not on file     Inability: Not on file     Transportation needs:     Medical: Not on file     Non-medical: Not on file   Tobacco Use     Smoking status: Never Smoker     Smokeless tobacco: Never Used   Substance and Sexual Activity     Alcohol use: Yes     Comment: 1 a month     Drug use: No     Sexual activity: Not on file   Lifestyle     Physical activity:     Days per week: Not on file     Minutes per session: Not on file     Stress: Not on file   Relationships     Social connections:     Talks on phone: Not on file     Gets together: Not on file     Attends Oriental orthodox service: Not on file     Active member of club or organization: Not on file     Attends meetings of clubs or organizations: Not on file     Relationship status: Not on file     Intimate partner violence:     Fear of current or ex partner: Not on file     Emotionally abused: Not on file     Physically abused: Not on file     Forced sexual activity: Not on file   Other Topics Concern     Not on file   Social History Narrative    Retired.      for 52 years and has 3 boys.      Current Outpatient Medications   Medication Sig Dispense Refill     cholecalciferol, vitamin D3, (VITAMIN D3) 5,000 unit Tab Take 5,000 Units by mouth daily.              dicyclomine (BENTYL) 10 MG capsule Take 10 mg by mouth 2 (two) times a day.              erythromycin ophthalmic ointment Administer 1 application to both eyes at bedtime.       fluticasone propionate (FLONASE ALLERGY RELIEF) 50 mcg/actuation nasal spray 2 sprays into each nostril.       LORazepam (ATIVAN) 0.5 MG tablet 1/2 tablet at night, only if needed for significant insomnia. 15 tablet 0     methylcellulose (CITRUCEL SUGAR FREE) Powd Mix 2 grams (1 heaping tablespoon) in 8 oz of cold water daily.       multivitamin therapeutic tablet Take 1 tablet by mouth daily.       omeprazole (PRILOSEC) 20 MG capsule Take 20 mg by mouth daily before breakfast.       PREMARIN  "0.625 mg tablet TAKE 1 TABLET BY MOUTH ONCE DAILY 90 tablet 0     RESTASIS 0.05 % ophthalmic emulsion TAKE 1 DROP(S) IN BOTH EYES daily  3     traZODone (DESYREL) 50 MG tablet Take 1 tablet or 2 tablets 1 hour before bedtime. 60 tablet 4     loperamide (IMODIUM) 2 mg capsule Take 2 tablets (4 mg) by mouth after 1st loose stool and 1 tablet (2 mg) after each next bowel movement. Do not exceed 16 mg in 24 hours.  6     Current Facility-Administered Medications   Medication Dose Route Frequency Provider Last Rate Last Dose     cyanocobalamin injection 1,000 mcg  1,000 mcg Intramuscular Q30 Days Lan Perkins MD   1,000 mcg at 01/06/20 0929      Objective:   Vital Signs:   Visit Vitals  /80   Pulse 72   Ht 5' 7\" (1.702 m)   Wt 154 lb (69.9 kg)   LMP 07/10/1984   BMI 24.12 kg/m         VisionScreening:  No exam data present     PHYSICAL EXAM  Alert and oriented x3 with good mood and affect.  Clock face drawing and word recall is normal.  Mobility is within normal limits.  Pupils and irises equal and reactive.  Extraocular muscles intact.  No jaundice or conjunctivitis.  External ears and nose exam is normal and tympanic membranes are normal.  Pharynx is not crowded, normal pharynx.  Teeth in good condition.  No oral lesions.  No cervical or supraclavicular or axillary adenopathy.  No JVD and no carotid bruits.  No thyromegaly or nodularity.  Lungs are clear to auscultation with good respiratory excursion.  Spine is straight.  Heart is regular without murmur.  +1 pedal pulses and no ankle edema.  Some mild toe degenerative changes but no pre-ulcerative calluses.  Mild osteoarthritis changes of the hand.  Abdomen is tight with scar tissue from her abdominal plasty with mild diffuse tenderness unchanged from previous.  No guarding.  No hepatosplenomegaly or pulsatile mass.  Skin exam without suspicious lesions.  Breast exam normal to inspection and palpation bilaterally, assistant in room for breast " exam.    Assessment Results 1/28/2020   Activities of Daily Living No help needed   Instrumental Activities of Daily Living No help needed   Get Up and Go Score Less than 12 seconds   Mini Cog Total Score 5   Some recent data might be hidden     A Mini-Cog score of 0-2 suggests the possibility of dementia, score of 3-5 suggests no dementia    Identified Health Risks:     Patient's advanced directive was discussed and I am comfortable with the patient's wishes.

## 2021-06-05 NOTE — TELEPHONE ENCOUNTER
Patient Returning Call  Reason for call:  Returning call  Information relayed to patient:    Relayed below information to patient.  Patient has additional questions:  Yes  If YES, what are your questions/concerns:    Patient states she will think about the Cardiac CT Scan and reach out to the Provider if she wants to move forward with scan.  Thank you.  Okay to leave a detailed message?: No call back needed

## 2021-06-05 NOTE — TELEPHONE ENCOUNTER
I did order a cardiac CT scan for calcium score.  Tell patient that insurance does not often cover that, there may be self-pay cost.  I believe it is $100 but she may wish to check on that.

## 2021-06-05 NOTE — TELEPHONE ENCOUNTER
Left voicemail for patient to return call to clinic. When patient returns call, please give them below message.    Angelika Merlos CMA ............... 11:41 AM, 01/29/20

## 2021-06-06 NOTE — TELEPHONE ENCOUNTER
Patient is questioning the vitamin B12 dose: is she to do the B12 injections every 3 months AND take the oral 1000 mcg B12 supplement daily?

## 2021-06-06 NOTE — TELEPHONE ENCOUNTER
I called patient and we reviewed her cardiac CT calcium score test that was just done.  Calcium score of 255.    She does have moderate elevation of LDL, but also has a higher HDL.  She does not have strong risk factors.    She had been on blood pressure medication in the past but is not on it now and has a normal blood pressure.    She is not having any chest pain or exertional type symptoms.    She has some chronic musculoskeletal neck pain in the morning from sleep position, but she states that stable and not exertional.    I did review signs and symptoms to watch out for and seek medical attention for, including chest pain, new or different type of neck pain or shoulder pain or shortness of breath with exertion, or other new significant symptom.    Patient denies sleep apnea symptoms.  Her blood pressures been normal.  Questionable mild to moderate ascending aortic dilatation seen on the CT scan.  Patient states she is claustrophobic and could not tolerate an MRI.    I would plan a CT angiogram of her thoracic aorta this summer.    I did discuss starting atorvastatin with patient for her positive cardiac CT scan and moderate hypercholesterolemia.  I did discuss risk of muscle achiness and weakness, liver toxicity risk, fatigue and mental side effect risk and other risks.  She accepts these risks and would want to proceed with atorvastatin, plan to start at 10 mg a day and titrate up.    Patient will delay her follow-up until July, with the current coronavirus outbreak.  She can contact me before her appointment if she does wish to start on the atorvastatin 1 month prior to her appointment.    Contact patient to reschedule her follow-up visit to July, fasting if able but she does not have to fast.

## 2021-06-06 NOTE — TELEPHONE ENCOUNTER
Appointment rescheduled as requested.      Marcella Mcguire, [facility]  1:11 PM  3/17/2020

## 2021-06-06 NOTE — TELEPHONE ENCOUNTER
Patient referred by [facility] recruitment    Outcome: GUTIERREZ    Thank you,    Yojana Knutson MTM coordinator

## 2021-06-06 NOTE — TELEPHONE ENCOUNTER
Upcoming Appointment Question  When is the appointment: 03/19/20  What is your appointment for?: Follow up cardiac CT scan; Dexa results  Who is your appointment scheduled with?: PCP only  What is your question/concern?: Pt wanting to know if she should keep her appointment? Is there anything sooner, or can she do a telephone visit? Please advise  Okay to leave a detailed message?: Yes [phone]

## 2021-06-06 NOTE — TELEPHONE ENCOUNTER
Called and spoke to patient. Dr. Gordon's advise was given to patient.    Dr. Gordon, pt state she has a few questions for you (she did not specify what questions she had). Pt also said she can not wait 3 months to come in and will like to discuss with you over the phone. [phone].

## 2021-06-06 NOTE — TELEPHONE ENCOUNTER
If she is doing the every 3-month regimen for B12, I would recommend the oral daily B12.  If she wants to skip the oral B12 daily supplement, she can go back to monthly injections.

## 2021-06-06 NOTE — TELEPHONE ENCOUNTER
Patient informed and expressed understanding. Appointment scheduled with PCP for 03/19.      Marcella Mcguire CMA  9:37 AM  3/2/2020

## 2021-06-06 NOTE — PROGRESS NOTES
"[facility] Daily Progress     Patient Name: Anais Kelly  Date: 3/13/2020  Visit #: 3/12  Date of POC: 20-20  Referral Diagnosis: Chronic bilateral thoracic back pain  Referring provider: Lopez Gordon MD  Visit Diagnosis:     ICD-10-CM    1. Pain in joint of right shoulder  M25.511    2. Thoracic spine pain  M54.6    3. Cervicalgia  M54.2    4. Tension headache  G44.209    5. Myofascial pain  M79.18          Assessment:     There has been an excellent increase in her ROM from evaluation. She did feel better today as well after treatment.   Patient is benefitting from skilled physical therapy and is making steady progress toward functional goals.  Patient is appropriate to continue with skilled physical therapy intervention, as indicated by initial plan of care.    Goal Status:  Pt. will demonstrate/verbalize independence in self-management of condition in : 12 weeks  Pt. will report decreased intensity, frequency of : Pain;Headache;in 12 weeks;Comment  Comment:: decrease pain from 2-5/10 to 0-3/10 with ADL and HA frequency to <2x/week  Patient Turn Head: for driving;for watching traffic;with full ROM;with less difficulty;in 12 weeks;Comment  Comment: increase C-rot to >60 deg B  Patient will reach / maintain arm movement: forward;overhead;behind;for home chores;for dressing;for other activity;with less difficulty;in 12 weeks  for other activity: curling her hair    Patient will decrease : NDI score;by _ points;for improved quality of function;for improved quality of life;in 12 weeks  by ___ points: 10      Plan / Patient Education:     Per POC    Subjective:     Pain Ratin  The abdominal pain is still going on. She does fel like has better ROM in her shoulders. She does feel like she is \"crunched up\" in her shoulders.      Objective:     Patient Active Problem List   Diagnosis     Irritable bowel syndrome with diarrhea     Benign essential hypertension     Insomnia     " Small fiber neuropathy (H); diagnosed by neurology     Abdominal pain, chronic, bilateral lower quadrant     Controlled substance agreement signed 5/9/19- Ativan 1mg for Insomnia     Hormone replacement therapy, postmenopausal     Ischemia of finger     Neural, visc, art fascial tensions.       Treatment Today     TREATMENT MINUTES COMMENTS   Evaluation     Self-care/ Home management     Manual therapy 40 Fascial release using Strain-Counterstrain of B AINT-N, B lower thor/lumbar MES-V, B lower cervical/upper thoracic-Art   Neuromuscular Re-education     Therapeutic Activity     Therapeutic Exercises 13 AA/PROM to BUE, C-T-L spine, ribs   Gait training     Modality__________________                Total 53    Blank areas are intentional and mean the treatment did not include these items.       Bennie Hernandez, PT  3/13/2020

## 2021-06-06 NOTE — PROGRESS NOTES
MTM Initial Encounter  Assessment & Plan                                                     1. Hyperlipidemia/HTN  Recent elevated calcium CT score of 233, placing patient in 75th percentile implying a high risk of cardiac events in the next ten years. Consider initiation of statin, which she will discuss with PCP at upcoming visit. I would recommend rosuvastatin specificially as it tends to have lower incidence of diarrhea compared to atorvastatin given her IBS. Medication education provided today.   Blood pressure is well controlled and meeting goal of <140/90 mm Hg per JNC-8 hypertension guidelines. No longer on lisinopril and BP remains well controlled.   Plan   1. Consider initiation of rosuvastatin.       2. Primary insomnia  Adequately controlled with trazodone. She has successfully weaned off lorazepam as recommended at our previous visit.     3. Irritable bowel syndrome/GERD  Stable, with intermittent diarrhea at times. Continue to follow with GI. Now on famotidine in place of ranitidine due to recall. Answered questions regarding dosing and administration.     4. Takes dietary supplements  Reviewed supplement use. Of note, recent DEXA scan indicates osteoporosis. Has upcoming appointment with PCP to discuss results and treatment. Given her IBS/GERD history and hiatal hernia, may want to consider IV bisphosphonate or Prolia. Recommend evaluation with osteoporosis specialist.     5. Hormone replacement therapy  Agree with using lowest effective dose of hormone replacement therapy for symptom relief. Discussed risks vs benefit of ongoing HRT use and patient has opted to continue as it has such a positive effect on her quality of life. Could attempt further dose reduction in the future.     Follow Up  PRN with MTM    Subjective & Objective                                                     Anais Kelly is a 70 y.o. female called for an initial visit for Medication Therapy Management. She was referred to  "me from  [facility] program.     Chief Complaint: Medication review   Medication Adherence/Access: Good overall medication adherence. She has hesitancy with starting new medication and possible risk of diarrhea given her sensitive GI system and IBS.     1. Hyperlipidemia/HTN  Anais recently had a CT calcium test with result of 233, indicating high risk of cardiac event. She is not on a statin.  She is concerned with any new medications and possibility of causing diarrhea.   Anais is no longer on medication for BP. Previously on lisinopril 10 mg daily, but this was stopped when she lost weight and BP has remained controlled off the medication.     The 10-year ASCVD risk score (Kanaluis COLEMAN Jr., et al., 2013) is: 9.1%    Values used to calculate the score:      Age: 70 years      Sex: Female      Is Non- : No      Diabetic: No      Tobacco smoker: No      Systolic Blood Pressure: 110 mmHg      Is BP treated: No      HDL Cholesterol: 82 mg/dL      Total Cholesterol: 273 mg/dL    2. Primary insomnia  Anais is taking trazodone 50 mg at bedtime. She has generally found this effective to help her sleep. She has been able to reduce dose and completely eliminate use of lorazepam, which she was using nightly last year for insomnia. She has a small supply just in case, but hasn't been using at all.   She has struggled with insomnia for years. She's tried Ambien CR and she had a panic attack with palpitations that sent her to the ER. Diphenhydramine also caused anxiety.     3. Irritable bowel syndrome/GERD  Anais is taking dicyclomine 10 mg two times a day and omeprazole 20 mg every morning before food, which helps with nausea and heartburn. She says she doesn't have good bowel regularity with occasional diarrhea. She will take famotidine as needed later in the day for indigestion.   She has seen gastroenterologist. She has an \"onion family\" allergy, which she is very sensitive too and causes GI " issues. She has history of abdominoplasty and pancolectomy in 2015 and says since then she has frequent abdominal pain from adhesions. She has a history of hiatal hernia with longstanding history of heartburn.     4. Takes dietary supplements  Anais is taking several supplements including vitamin D 5,000 IU daily, MVI, and vitamin B12 injection 1,000 mcg monthly. Previously on calcium supplement, but this was stopped due to elevated calcium level. She did have a recent DEXA scan indicating osteoporosis, which shocked her as her previous DEXA scans at her old clinic did not show even osteopenia.     Vitamin D, Total (25-Hydroxy)   Date Value Ref Range Status   02/05/2016 64.1 30.0 - 80.0 ng/mL Final     Vitamin B-12   Date Value Ref Range Status   07/17/2018 420 213 - 816 pg/mL Final     DEXA 2/24/20:  1. The spine bone density L1-L4 with T-score -0.6.  2. Femoral bone densities show left total hip T- score -2.1 and right total hip T-score -2.6.  3. Trabecular bone score indicates poor trabecular bone architecture.    5. Hormone replacement therapy  Anais is taking Premarin 0.625 mg daily. She had a total hysterectomy at age of 34. She has been on HRT since then. Over the years, she's tried decreasing dose and/or weaning off, but then feels bothersome menopausal symptoms. She tried going off for 1 month, but symptoms were unbearable with severe hot flashes. She has gone down from a dose of 1.25 mg.     PMH: reviewed in EPIC   Allergies/ADRs: reviewed in EPIC   Alcohol: yes, once per month  Tobacco:   Social History     Tobacco Use   Smoking Status Never Smoker   Smokeless Tobacco Never Used     Vitals:   BP Readings from Last 3 Encounters:   01/28/20 110/80   01/06/20 118/80   11/12/19 128/80     Pulse Readings from Last 3 Encounters:   01/28/20 72   01/06/20 76   11/12/19 80     Wt Readings from Last 3 Encounters:   01/28/20 154 lb (69.9 kg)   01/06/20 153 lb (69.4 kg)   11/12/19 157 lb (71.2 kg)      ----------------    The patient was given a CMS standardized format medication action plan    I spent 45 minutes with this patient today. An extra 15 minutes was spent creating the Medication Action Plan. I offer these suggestions for consideration by the PCP. A copy of the visit note was provided to the patient's provider.     Lissy Ortega, PharmD, BCACP  Medication Management (MTM) Pharmacist  [facility]        Current Outpatient Medications   Medication Sig Dispense Refill     cholecalciferol, vitamin D3, (VITAMIN D3) 5,000 unit Tab Take 5,000 Units by mouth daily.              dicyclomine (BENTYL) 10 MG capsule Take 10 mg by mouth 2 (two) times a day.              erythromycin ophthalmic ointment Administer 1 application to both eyes at bedtime.       fluticasone propionate (FLONASE ALLERGY RELIEF) 50 mcg/actuation nasal spray 2 sprays into each nostril.       loperamide (IMODIUM) 2 mg capsule Take 2 tablets (4 mg) by mouth after 1st loose stool and 1 tablet (2 mg) after each next bowel movement. Do not exceed 16 mg in 24 hours.  6     LORazepam (ATIVAN) 0.5 MG tablet 1/2 tablet at night, only if needed for significant insomnia. 15 tablet 0     methylcellulose (CITRUCEL SUGAR FREE) Powd Mix 2 grams (1 heaping tablespoon) in 8 oz of cold water daily.       multivitamin therapeutic tablet Take 1 tablet by mouth daily.       omeprazole (PRILOSEC) 20 MG capsule Take 20 mg by mouth daily before breakfast.       PREMARIN 0.625 mg tablet TAKE 1 TABLET BY MOUTH ONCE DAILY 90 tablet 0     RESTASIS 0.05 % ophthalmic emulsion TAKE 1 DROP(S) IN BOTH EYES daily  3     traZODone (DESYREL) 50 MG tablet Take 1 tablet or 2 tablets 1 hour before bedtime. 60 tablet 4     Current Facility-Administered Medications   Medication Dose Route Frequency Provider Last Rate Last Dose     cyanocobalamin injection 1,000 mcg  1,000 mcg Intramuscular Q30 Days Lan Perkins MD   1,000 mcg at 02/24/20  1006     cyanocobalamin injection 1,000 mcg  1,000 mcg Intramuscular Q30 Days Lopez Gordon MD

## 2021-06-06 NOTE — TELEPHONE ENCOUNTER
Discussion about starting a cholesterol medication and reimaging her thoracic aorta can wait for 3 months.  Okay for patient to reschedule to July.

## 2021-06-06 NOTE — TELEPHONE ENCOUNTER
Patient would like to do monthly injections instead. New order pended.  Angelika Merlos CMA ............... 11:23 AM, 02/24/20

## 2021-06-06 NOTE — TELEPHONE ENCOUNTER
Tell patient that her cardiac CT scan did show significant coronary artery calcification.  There was also possibly some suggestion of increased size of her thoracic aorta.  I would like to discuss starting cholesterol medication, as well as discussing further imaging of her thoracic aorta.    Have her make an appointment to see me this spring to discuss these issues.  Nonfasting appointment.

## 2021-06-08 NOTE — PROGRESS NOTES
B-12 and Td injection.     Patient consents to receive outdoor care: Yes    Upon arrival, patient instructed to proceed to designated location, place vehicle in park, turn off, and remove keys  and Patient receiving an immunization or injection. Instructed patient to notify healthcare personnel if they are having an adverse reaction.    If we are unable to safely and ergonomically able to provide care- is the patient able to safely able to get out of car and transfer to a chair? Yes    Patient would like to receive their AVS not needed. .

## 2021-06-09 NOTE — PATIENT INSTRUCTIONS - HE
See  to set up a CT angiogram today of your chest and abdomen.    I suspect you are having diverticulosis of your left lower abdomen.  Monitor for signs of diverticulitis, which would be increasing pain and fever.  Seek medical attention right away/ER if increasing abdominal pain and fever with possible diverticulitis.    Start Citrucel 2 scoops with water nightly.    Plan to repeat bone density scan in 1 to 2 years.  Walk on a daily basis.  Continue to eat dairy and calcium rich foods on a daily basis and continue current vitamin D supplement.    Start atorvastatin 10 mg every evening for cholesterol.  If you experience significant increasing muscle aches or upper abdominal pain, hold atorvastatin and contact clinic right away.    Set up a a virtual follow-up visit in 4 to 6 weeks to follow-up on starting atorvastatin.  Set up a nonfasting lab appointment 1 to 2 days before that virtual appointment.

## 2021-06-09 NOTE — PROGRESS NOTES
[facility] clinic Follow Up Note    Anais Kelly   70 y.o. female    Date of Visit: 7/8/2020    Chief Complaint   Patient presents with     Follow-up     Routine checkup. Discuss cardiac CT calcium score, cholesterol med, and DXA scan results.      Derrick Arguello was scheduled for a follow-up appointment to discuss cholesterol as well as her recent bone density scan.    And she had a new complaint of increasing intermittent left lower quadrant abdominal pain.    This started approximately 2 weeks ago with cramping left lower quadrant abdominal pain that woke her up in the middle the night.    She got up and had a number of bowel movements and it steadily did improve after about 3 hours.  She did have 1 emesis spell during the pain.    No blood in stool.  She denies significant constipation or diarrhea.  Her stools tend to be loose.    No fever.    Patient had a relapse of the left lower quadrant abdominal pain with similar symptoms and emesis 1 week later and then a few days later which was 2 days ago.    She has been having some mild intermittent left lower quadrant pain, does have some now.  No fever.  No blood in stool.  No urinary symptoms.  No back tearing type pain or chest pain.  No increasing shortness of breath or edema.    She is taken Citrucel in the past but has not taken that for a number of years.    Her last colonoscopy she reports approximately 8 to 9 years ago and negative.  No family history of colon cancer.    I did review the CT scan of the chest from February 2020.  She had a cardiac CT scan.  There was some mention of mild to moderate ascending aortic enlargement.    She has not suspected sleep apnea, she has not had previous work-up for aneurysm.    She does not have hypertension and has never smoked.    A CT scan in 2018 did not show an AAA but there was some aortic plaque noted.    No family history of heart disease.    Her cardiac CT scan did show a calcium score of 233  mostly in the LAD.  She has not had chest pain or cardiac event.    I did review the labs from January 2020 with an LDL of 169 and HDL 82.  Normal kidney and liver tests.    I also reviewed the bone density scan from February of this year.  Spine score -0.6.  Femur score -2.1/-2.6.  Poor trabecular bone.  No fracture history.    No reflux or heartburn issues or swallowing problems.    She has not been taking calcium but does eat a dairy rich diet daily and does take a vitamin D supplement daily.  She is not always walking daily.    She has some mild small fiber neuropathy but no worsening symptoms.    She does have B12 deficiency, takes oral B12 and regular B12 shots.    Chronic anxiety insomnia stable.  Uses trazodone.    She is on Premarin but I did review the mammogram from January 2020, negative.    PMHx:  No past medical history on file.  PSHx:    Past Surgical History:   Procedure Laterality Date     HYSTERECTOMY  1984     IR EXTREMITY ANGIOGRAM LEFT  5/17/2019     OOPHORECTOMY Bilateral 1984     Immunizations:   Immunization History   Administered Date(s) Administered     Influenza high dose,seasonal,PF, 65+ yrs 10/22/2019     Influenza, Seasonal, Inj PF IIV3 11/15/1996, 09/29/1997, 07/27/1998, 12/12/2000, 10/01/2002, 10/01/2003, 11/02/2007, 09/09/2009     Influenza, inj, historic,unspecified 10/01/2014     Influenza,seasonal, Inj IIV3 11/10/2006, 10/22/2010, 10/11/2011, 10/09/2012     Pneumo Conj 13-V (2010&after) 06/29/2015     Pneumo Polysac 23-V 04/15/2013     Td, adult adsorbed, PF 03/11/1999, 05/18/2020     Tdap 01/26/2009     ZOSTER, LIVE 10/16/2009     ZOSTER, RECOMBINANT, IM 07/24/2018, 10/20/2018       ROS A comprehensive review of systems was performed and was otherwise negative    Medications, allergies, and problem list were reviewed and updated    Exam  /80 (Patient Site: Right Arm, Patient Position: Sitting, Cuff Size: Adult Large)   Pulse 84   Wt 152 lb (68.9 kg)   LMP 07/10/1984    BMI 23.81 kg/m    Lungs are clear to auscultation.  Heart is regular with no murmur rub or gallop.  Abdomen is moderately overweight.  There is moderate bilateral lower abdominal tenderness to palpation.  Some mild upper abdominal tenderness to palpation but belly soft and no guarding.  No mass.  I did not feel a pulsatile mass.  Skin appears normal.  No ankle edema.    Assessment/Plan  1. Abdominal pain, left lower quadrant, new increasing problem requiring further evaluation.  New left lower quadrant abdominal pain increasing with relapsing spells.    I suspect this is consistent with diverticulosis and possibly a sigmoid stricture.    Recent escalating pain and some pain today.    She did have questionable changes to her aorta on CT scan in February.    I will have her proceed with a CT angiogram at this time to rule out aneurysmal association with the pain.    More likely a diverticulosis scenario and I will have her start taking Citrucel 2 scoops every evening.    I did discuss diverticulitis warnings with increasing pain and fever and to seek medical attention immediately if that occurs.      - CTA Chest Abdomen Pelvis; Future  - HM2(CBC w/o Differential)    2. Ascending aorta enlargement (H)  As above  - CTA Chest Abdomen Pelvis; Future    Patient does not believe she has sleep apnea and does not wish to have a sleep apnea evaluation.  3. Agatston coronary artery calcium score between 200 and 399  Lower cardiovascular risk factors but there is evidence of vascular disease on her CT scan.    She does have moderate hypercholesterolemia despite her high HDL.    Patient does wish to proceed with initiation of atorvastatin.    No chest pain good exercise tolerance.    4. Hypercholesterolemia  I did discuss toxicity risk with atorvastatin including muscle and liver toxicity.  See patient instructions.    She will follow-up in approximately 4 to 6 weeks with nonfasting labs ahead of time, and virtual follow-up.  -  atorvastatin (LIPITOR) 10 MG tablet; Take 1 tablet (10 mg total) by mouth daily.  Dispense: 90 tablet; Refill: 3  - Lipid Cascade    5. Senile osteoporosis  I did discuss DEXA scan with patient.  I did discuss Fosamax option.  Patient wishes to hold off on starting Fosamax.  I emphasized the importance of daily weightbearing walking exercise.  Continue calcium rich diet and vitamin D supplement.    Repeat DEXA scan in 1 to 2 years.    6. Vitamin B12 deficiency (non anemic)  B12 shot given today.  Continue daily oral B12.    7. Encounter for therapeutic drug monitoring    - Comprehensive Metabolic Panel  - CK Total  - Comprehensive Metabolic Panel; Future  - CK Total; Future    No follow-ups on file.   Patient Instructions   See  to set up a CT angiogram today of your chest and abdomen.    I suspect you are having diverticulosis of your left lower abdomen.  Monitor for signs of diverticulitis, which would be increasing pain and fever.  Seek medical attention right away/ER if increasing abdominal pain and fever with possible diverticulitis.    Start Citrucel 2 scoops with water nightly.    Plan to repeat bone density scan in 1 to 2 years.  Walk on a daily basis.  Continue to eat dairy and calcium rich foods on a daily basis and continue current vitamin D supplement.    Start atorvastatin 10 mg every evening for cholesterol.  If you experience significant increasing muscle aches or upper abdominal pain, hold atorvastatin and contact clinic right away.    Set up a a virtual follow-up visit in 4 to 6 weeks to follow-up on starting atorvastatin.  Set up a nonfasting lab appointment 1 to 2 days before that virtual appointment.    oLpez Gordon MD        Current Outpatient Medications   Medication Sig Dispense Refill     cholecalciferol, vitamin D3, (VITAMIN D3) 5,000 unit Tab Take 5,000 Units by mouth daily.              dicyclomine (BENTYL) 10 MG capsule Take 10 mg by mouth 2 (two) times a day.               "erythromycin ophthalmic ointment Administer 1 application to both eyes at bedtime.       famotidine (PEPCID) 20 MG tablet Take 20 mg by mouth daily as needed.       fluticasone propionate (FLONASE ALLERGY RELIEF) 50 mcg/actuation nasal spray 2 sprays into each nostril daily as needed.       loperamide (IMODIUM) 2 mg capsule Take 2 tablets (4 mg) by mouth after 1st loose stool and 1 tablet (2 mg) after each next bowel movement. Do not exceed 16 mg in 24 hours.  6     multivitamin therapeutic tablet Take 1 tablet by mouth daily.       omeprazole (PRILOSEC) 20 MG capsule Take 20 mg by mouth daily before breakfast.       PREMARIN 0.625 mg tablet Take 1 tablet by mouth once daily 90 tablet 3     RESTASIS 0.05 % ophthalmic emulsion Administer 1 drop to both eyes daily.  3     traZODone (DESYREL) 50 MG tablet Take 1 tablet or 2 tablets 1 hour before bedtime. 60 tablet 4     atorvastatin (LIPITOR) 10 MG tablet Take 1 tablet (10 mg total) by mouth daily. 90 tablet 3     Current Facility-Administered Medications   Medication Dose Route Frequency Provider Last Rate Last Dose     cyanocobalamin injection 1,000 mcg  1,000 mcg Intramuscular Q30 Days Lopez Gordon MD   1,000 mcg at 07/08/20 0841     Allergies   Allergen Reactions     Acetaminophen Nausea Only     Amoxicillin Diarrhea     Celebrex [Celecoxib] Other (See Comments)     \"stomach upset\"     Onion      Vicodin [Hydrocodone-Acetaminophen] Nausea Only     Zolpidem Tartrate      Codeine Rash     Diphenhydramine Anxiety     hyper     Zolpidem Anxiety and Palpitations     Social History     Tobacco Use     Smoking status: Never Smoker     Smokeless tobacco: Never Used   Substance Use Topics     Alcohol use: Yes     Comment: 1 a month     Drug use: No           "

## 2021-06-10 NOTE — TELEPHONE ENCOUNTER
Set up a nonfasting visit 1 to 2 days before the phone visit.  CK and comprehensive metabolic panel orders already in.  Okay for phone visit, if she cannot do video.

## 2021-06-10 NOTE — TELEPHONE ENCOUNTER
Three VM's left for pt to call back and schedule. Encounter closed. If pt calls back, please assist with scheduling

## 2021-06-10 NOTE — TELEPHONE ENCOUNTER
New Appointment Needed  What is the reason for the visit:    F/U appointment - This can be via telephone appointment.  could not book a VV   Provider Preference: PCP only  How soon do you need to be seen?: 8/17/2020  Waitlist offered?: No  Okay to leave a detailed message:  Yes

## 2021-06-10 NOTE — TELEPHONE ENCOUNTER
The patient picked up the phone and hung up after she apparently could not hear me. I called again and left a VM stating that we will give her another call tomorrow to schedule a lab appt next week with a video visit 1 to 2 days after that.

## 2021-06-11 NOTE — TELEPHONE ENCOUNTER
Can this be done?   Angelika Merlos [facility] ............... 4:55 PM, 09/09/20

## 2021-06-11 NOTE — PROGRESS NOTES
[facility] clinic Follow Up Note    Anais Kelly   70 y.o. female    Date of Visit: 9/2/2020    Chief Complaint   Patient presents with     Follow-up     Subjective  Saundra is here for follow-up of hypercholesterolemia with evidence of vascular disease.    She also had some lower abdominal pain on July 8 consistent with diverticulosis.    I did review the CT scan of the chest abdomen and pelvis from July 15, 2020 today.  There is diverticulosis but not diverticulitis.  She continues to have the chronic lower abdominal pain.  She had more bloating with the Citrucel and an early bowel movement in the night when she took it in the evening.  No blood in stool but occasional bright red blood per rectum with hemorrhoids.    Her colonoscopy was about 9 years ago and otherwise negative.  No family history of colon cancer.    Patient did have evidence of vascular disease on her CT scan.  She had a 4.0 x 4.1 cm AAA ascending aortic dilatation.  There was some atherosclerosis noted.  No AAA.    Her blood pressure is always been normal.  She is never smoked.    She does have a family history of coronary disease with her son at age 36 having a heart attack.    Cardiac CT scan in February of this year showed a calcium score of 233.  No history of cardiac event.    July 2020 labs reviewed with an LDL of 175 and HDL 72.  I did have her start on atorvastatin 10 mg a day.    She has some generalized myalgias symptoms of mild severity.  She has been having some increased lower back stiffness, she is moderately overweight and feels her core strength has lessened and is interested in some physical therapy.  She is also having some right knee lateral fascial or tendinitis type pain.  No knee swelling.    Sleep apnea has not been suspected but not evaluated.  She does not want evaluation for sleep apnea.    She does have a history of a small fiber neuropathy diagnosed by neurology in the past and a B12 deficiency.  She was not  good about taking B12 shots monthly earlier this year.  Did get a B12 shot on July 8.  She is not been taking the oral B12.    No foot numbness.  She has a mild sensation of paresthesias on her hands and feet intermittently.    Status post hysterectomy on Premarin.  Negative mammogram in January.    February 2020 DEXA scan with a femur score -2.1/-2.6 with a spine score of -0.6.  Poor trabecular bone.  No fracture history.  She does not want to start Fosamax at this time but may consider next year.  She is on a vitamin D supplement.    No exertional type chest pain.  No increasing shortness of breath or palpitations.    PMHx:  No past medical history on file.  PSHx:    Past Surgical History:   Procedure Laterality Date     HYSTERECTOMY  1984     IR EXTREMITY ANGIOGRAM LEFT  5/17/2019     OOPHORECTOMY Bilateral 1984     Immunizations:   Immunization History   Administered Date(s) Administered     Influenza high dose,seasonal,PF, 65+ yrs 10/22/2019     Influenza, Seasonal, Inj PF IIV3 11/15/1996, 09/29/1997, 07/27/1998, 12/12/2000, 10/01/2002, 10/01/2003, 11/02/2007, 09/09/2009     Influenza, inj, historic,unspecified 10/01/2014     Influenza,seasonal, Inj IIV3 11/10/2006, 10/22/2010, 10/11/2011, 10/09/2012     Pneumo Conj 13-V (2010&after) 06/29/2015     Pneumo Polysac 23-V 04/15/2013     Td, adult adsorbed, PF 03/11/1999, 05/18/2020     Tdap 01/26/2009     ZOSTER, LIVE 10/16/2009     ZOSTER, RECOMBINANT, IM 07/24/2018, 10/20/2018       ROS A comprehensive review of systems was performed and was otherwise negative    Medications, allergies, and problem list were reviewed and updated    Exam  /82 (Patient Site: Right Arm, Patient Position: Sitting, Cuff Size: Adult Large)   Pulse 68   Temp 96.8  F (36  C) (Other) Comment (Src): forehead  Wt 156 lb (70.8 kg)   LMP 07/10/1984   BMI 24.43 kg/m    Declined exam table without difficulty.  Lungs are clear.  Heart is regular without murmur.  Abdomen shows diffuse  mild tenderness consistent with her chronic abdominal discomfort/diverticulosis.  No guarding.  No pulsatile mass.  No ankle edema.    Assessment/Plan  1. Hypercholesterolemia  Appears to be tolerating atorvastatin 10 mg a day well.  She has some lower back pain and some right knee pain but not a severe generalized myalgia condition.  We will be checking CK today.    Continue atorvastatin 10 mg a day.  Reevaluate at her physical exam next February.  Could consider further increase in atorvastatin.  Goal LDL less than 100.    Not on a daily aspirin  - Lipid [place]    2. Agatston coronary artery calcium score between 200 and 399  No exertional type chest pain.  Initiating atorvastatin.  Not on aspirin.    3. Ascending aorta enlargement (H)  Borderline ascending aortic dilatation.  She does not believe she has sleep apnea and did not want evaluation.  Blood pressures been normal.    Plan recheck next year.    4. Senile osteoporosis  Consider Fosamax next year.    5. Vitamin B12 deficiency (non anemic)  I will have her continue the monthly B12 shots.  I encouraged her to take the oral B12, especially if she is delayed getting IM B12 shot with a coronavirus outbreak.      - cyanocobalamin 1000 MCG tablet; Take 1 tablet (1,000 mcg total) by mouth daily.  Dispense: 100 tablet; Refill: 2    6. Small fiber neuropathy (H); diagnosed by neurology  She was offered referral back to neurology and EMG evaluation but she declined at this time.  Continue B12 shots.    Possibility for sleep apnea but she did not want evaluation at this time.    7. Diverticulosis of large intestine without hemorrhage  Seen on CT scan last July.  She was given warnings about diverticulitis signs and symptoms and to seek medical attention immediately if that occurs.    She is having some difficulty getting on a fiber supplement.  I encouraged her to use the 1 scoop twice a day and titrate up and adjust as needed.  Could use stool softeners and/or  MiraLAX as well.    8. Encounter for therapeutic drug monitoring    - Comprehensive Metabolic Panel  - CK Total    9. Chronic bilateral low back pain without sciatica  She complained of some chronic lower back pain she has moderately obese.  She could benefit from weight loss and core muscle strengthening.  Refer to physical therapy.    I encouraged her to make an appoint with her orthopedic doctor about the knee pain  - Ambulatory referral to Adult PT- Internal    Yearly mammogram in January    Return in about 5 months (around 2/2/2021) for Annual physical.   Patient Instructions   B12 shot today.  Continue B12 injections monthly.  I would recommend oral B12 1000 mcg sublingual tablet daily as an extra supplement to your B12 shots, especially if you are late with your monthly B12 shots.    You have been given a referral to physical therapy to help you with your core muscle strengthening and back pain.  See your orthopedic clinic regarding knee pain.    Contact me if you have significant worsening generalized muscle achiness.    Follow-up for a fasting physical in February.    Adjust your fiber supplement to get regular soft bowel movements on a daily basis.  Try Citrucel or psyllium powder 1 scoop with glass of water twice a day.    I will plan to have you repeat imaging of your ascending aortic dilatation next July.    Lopez Gordon MD        Current Outpatient Medications   Medication Sig Dispense Refill     atorvastatin (LIPITOR) 10 MG tablet Take 1 tablet (10 mg total) by mouth daily. 90 tablet 3     cholecalciferol, vitamin D3, (VITAMIN D3) 5,000 unit Tab Take 5,000 Units by mouth daily.              dicyclomine (BENTYL) 10 MG capsule Take 10 mg by mouth 2 (two) times a day.              erythromycin ophthalmic ointment Administer 1 application to both eyes at bedtime.       famotidine (PEPCID) 20 MG tablet Take 20 mg by mouth daily as needed.       fluticasone propionate (FLONASE ALLERGY RELIEF) 50  "mcg/actuation nasal spray 2 sprays into each nostril daily as needed.       loperamide (IMODIUM) 2 mg capsule Take 2 tablets (4 mg) by mouth after 1st loose stool and 1 tablet (2 mg) after each next bowel movement. Do not exceed 16 mg in 24 hours.  6     multivitamin therapeutic tablet Take 1 tablet by mouth daily.       omeprazole (PRILOSEC) 20 MG capsule Take 20 mg by mouth daily before breakfast.       PREMARIN 0.625 mg tablet Take 1 tablet by mouth once daily 90 tablet 3     RESTASIS 0.05 % ophthalmic emulsion Administer 1 drop to both eyes daily.  3     traZODone (DESYREL) 50 MG tablet Take 1 tablet or 2 tablets 1 hour before bedtime. (Patient taking differently: Take 50 mg by mouth at bedtime. Take 1 tablet or 2 tablets 1 hour before bedtime.) 60 tablet 4     cyanocobalamin 1000 MCG tablet Take 1 tablet (1,000 mcg total) by mouth daily. 100 tablet 2     Current Facility-Administered Medications   Medication Dose Route Frequency Provider Last Rate Last Dose     cyanocobalamin injection 1,000 mcg  1,000 mcg Intramuscular Q30 Days Lopez Gordon MD   1,000 mcg at 07/08/20 0841     Allergies   Allergen Reactions     Acetaminophen Nausea Only     Amoxicillin Diarrhea     Celebrex [Celecoxib] Other (See Comments)     \"stomach upset\"     Onion      Vicodin [Hydrocodone-Acetaminophen] Nausea Only     Zolpidem Tartrate      Codeine Rash     Diphenhydramine Anxiety     hyper     Zolpidem Anxiety and Palpitations     Social History     Tobacco Use     Smoking status: Never Smoker     Smokeless tobacco: Never Used   Substance Use Topics     Alcohol use: Yes     Comment: 1 a month     Drug use: No           "

## 2021-06-11 NOTE — TELEPHONE ENCOUNTER
Who is calling:  Patient   Reason for Call:  Patient received a letter stating that she was a no show for a lab appointment on 9/2/20 at 915am.  Patient had an office visit with Dr. Gordon on the same day at 8:40am and the nurse walked the patient to the lab but never checked her in.  Patient had labs drawn and she is wanting to have the no show removed from her chart.  Date of last appointment with primary care: 9/2/20  Okay to leave a detailed message: Yes

## 2021-06-11 NOTE — PATIENT INSTRUCTIONS - HE
B12 shot today.  Continue B12 injections monthly.  I would recommend oral B12 1000 mcg sublingual tablet daily as an extra supplement to your B12 shots, especially if you are late with your monthly B12 shots.    You have been given a referral to physical therapy to help you with your core muscle strengthening and back pain.  See your orthopedic clinic regarding knee pain.    Contact me if you have significant worsening generalized muscle achiness.    Follow-up for a fasting physical in February.    Adjust your fiber supplement to get regular soft bowel movements on a daily basis.  Try Citrucel or psyllium powder 1 scoop with glass of water twice a day.    I will plan to have you repeat imaging of your ascending aortic dilatation next July.

## 2021-06-11 NOTE — TELEPHONE ENCOUNTER
Refill Approved    Rx renewed per Medication Renewal Policy. Medication was last renewed on 1/6/20.    Penny Finch, [facility] [place] Connection Triage/Med Refill 9/6/2020     Requested Prescriptions   Pending Prescriptions Disp Refills     traZODone (DESYREL) 50 MG tablet [Pharmacy Med Name: TRAZODONE 50 MG TABLET] 60 tablet 4     Sig: TAKE 1 TO 2 TABLETS BY MOUTH 1 HOUR BEFORE BEDTIME       Tricyclics/Misc Antidepressant/Antianxiety Meds Refill Protocol Passed - 9/3/2020 12:33 AM        Passed - PCP or prescribing provider visit in last year     Last office visit with prescriber/PCP: 9/2/2020 Lopez Gordon MD OR same dept: 9/2/2020 Lopez Gordon MD OR same specialty: 9/2/2020 Lopez Gordon MD  Last physical: 1/28/2020 Last MTM visit: Visit date not found   Next visit within 3 mo: Visit date not found  Next physical within 3 mo: Visit date not found  Prescriber OR PCP: Lopez Gordno MD  Last diagnosis associated with med order: 1. Other insomnia  - traZODone (DESYREL) 50 MG tablet [Pharmacy Med Name: TRAZODONE 50 MG TABLET]; TAKE 1 TO 2 TABLETS BY MOUTH 1 HOUR BEFORE BEDTIME  Dispense: 60 tablet; Refill: 4    If protocol passes may refill for 12 months if within 3 months of last provider visit (or a total of 15 months).

## 2021-06-14 NOTE — PATIENT INSTRUCTIONS - HE
Establishing primary care for this 70-year-old woman, who used to work as a  for the [place] [employer], whose  is also my patient.  Issues are as follows: Insomnia with early morning awakenings, for which anxiety may be contributing, but there is some opportunity to improve her nighttime habits, okay to continue trazodone.  Anxiety symptoms worse the last 6 months, her irritable bowel syndrome symptoms contribute, could consider trying an SSRI such as Lexapro.  Generalized myalgias, which could be fibromyalgia, related to sleep disturbance.  Enlargement of the ascending aorta, 4.0 cm by CT scan July 15, 2020 with some atheromatous calcifications of the descending and abdominal aorta.  Coronary artery calcifications, asymptomatic.  Hyperlipidemia, has been on atorvastatin 10 mg a day but LDL cholesterol has been above 100 so therefore we will increase to 20 mg a day.  Small fiber neuropathy diagnosed by neurology, with paresthesias in her hands and feet, could consider trying gabapentin.  History of low vitamin B12 for which she has been getting B12 shots, but not consistently, I think she would absorb vitamin B12 just fine as an oral 1 mg tablet.  Osteoporosis with thoracic kyphosis noted on physical examination, I think she should consider going on antiresorptive medication, and that would probably be Prolia instead of alendronate because of her GI issues.  Irritable bowel syndrome with fecal urgency, gas, bloating, and history of abdominal wall pain related to complications from abdominal plasty surgery.  Diverticulosis seen on CT scan, recommend staying on fiber supplement, she recalls colonoscopy done in approximately 2011.  Chronic low back pain without sciatica, with degenerative osteophytes and facet arthropathy and sacroiliac joint osteoarthrosis and bilateral hip osteoarthrosis noted on CT scan July 2020.  Bilateral knee osteoarthritis, had corticosteroid  injection January 3, 2020.  History of right rotator cuff surgery approximately 2010, with bilateral glenohumeral osteoarthrosis involving both rotator cuff seen on CT scan July 15, 2020.  Post menopause status post hysterectomy on daily Premarin estrogen replacement.  Dry eye syndrome, takes Restasis eyedrops.    She does not need any laboratory work today.  I increased her atorvastatin 20 mg a day from previous dose of 10.  Osteoporosis program consultation requested.    Going issue issue:    Insomnia with early morning awakenings, for which anxiety may be contributing, but there is some opportunity to improve her nighttime habits, okay to continue trazodone.  She goes to bed around 11:00, but tends to wake up around 1 AM.  She may lie there in bed for an hour an hour and a half, and think anxious thoughts.  I suggested a different routine where if she wakes up, and is not able to fall back to sleep within about 10 to 15 minutes, then get out of bed.  Put on blue blocking glasses, leave the bedroom and watch the living room, read quietly for 8015 or 20 minutes, maybe have a little bit of warm milk, and when feeling drowsy then go back to the bedroom and try to go back to sleep.  She takes trazodone at bedtime, and I think it is fine to continue that.    Anxiety symptoms worse the last 6 months, her irritable bowel syndrome symptoms contribute, could consider trying an SSRI such as Lexapro.  She is to think about whether she wants to try medication to relieve anxiety symptoms.  Some of the anxious thoughts are topic focused, such as needing to stay close to bathrooms because of her IBS.    Generalized myalgias, which could be fibromyalgia, related to sleep disturbance.  If we can get her sleeping better, perhaps some of the aches and pains might improve.    Enlargement of the ascending aorta, 4.0 cm by CT scan July 15, 2020 with some atheromatous calcifications of the descending and abdominal aorta.  Recommend  rechecking the aorta in 2022.    Coronary artery calcifications, asymptomatic.  Not experiencing any symptoms of angina or heart failure.    Hyperlipidemia, has been on atorvastatin 10 mg a day but LDL cholesterol has been above 100 so therefore we will increase to 20 mg a day.  Because she has coronary artery calcifications as well as atherosclerosis involving her aorta, I think it makes sense to intensify her atorvastatin or to try the LDL cholesterol down to about 70.    Small fiber neuropathy diagnosed by neurology, with paresthesias in her hands and feet, could consider trying gabapentin.  For the neuropathy, she could consider using gabapentin for symptomatic relief.  Dose could start at 100 mg 3 times a day, and could escalate to 300 mg 3 times a day.  She will think about this, research gabapentin online, and get back to me.    History of low vitamin B12 for which she has been getting B12 shots, but not consistently, I think she would absorb vitamin B12 just fine as an oral 1 mg tablet.  By my review of her history, I do not see any reason why she would malabsorb vitamin B12.  I would like her to try taking oral vitamin B12 1 mg tablet, and then we can check a vitamin B12 level in a few months to see if she is maintaining a good level.    Osteoporosis with thoracic kyphosis noted on physical examination, I think she should consider going on antiresorptive medication, and that would probably be Prolia instead of alendronate because of her GI issues.      Irritable bowel syndrome with fecal urgency, gas, bloating, and history of abdominal wall pain related to complications from abdominal plasty surgery.  She is learn which foods to avoid such as onions and spicy food.  She has worked with Dr. Nick at [facility].  He Has Her Using Imodium, Dicyclomine, Daily Omeprazole, Supplemented by Famotidine.    Diverticulosis seen on CT scan, recommend staying on fiber supplement, she recalls colonoscopy done  in approximately 2011.      Chronic low back pain without sciatica, with degenerative osteophytes and facet arthropathy and sacroiliac joint osteoarthrosis and bilateral hip osteoarthrosis noted on CT scan July 2020.      Bilateral knee osteoarthritis, had corticosteroid injection January 3, 2020.      History of right rotator cuff surgery approximately 2010, with bilateral glenohumeral osteoarthrosis involving both rotator cuff seen on CT scan July 15, 2020.      Post menopause status post hysterectomy on daily Premarin estrogen replacement.      Dry eye syndrome, takes Restasis eyedrops.

## 2021-06-14 NOTE — PROGRESS NOTES
[facility] Discharge Summary  Patient Name: Anais Kelly  Date: 1/14/2021  Referral Diagnosis: shoulder pain  Referring provider: Lopez Gordon MD  Visit Diagnosis:   1. Pain in joint of right shoulder     2. Thoracic spine pain     3. Cervicalgia     4. Tension headache     5. Myofascial pain           Patient was seen for 3 visits from 1/22/2020 to 3/13/2020 with 0 missed appointments.  The patient attended therapy initially, but did not finish the therapy sessions prescribed.  Goals were not fully achieved. Explanation for goals not achieved: unable to make futher follow ups due to Covid-19 pandemic.     Therapy will be discontinued at this time.  The patient will need a new referral to resume.    Thank you for your referral.  Bennie Hernandez  1/14/2021  9:36 PM

## 2021-06-14 NOTE — PROGRESS NOTES
[facility] Discharge Summary  Patient Name: Anais Kelly  Date: 1/14/2021  Referral Diagnosis: [unfilled]  Referring provider: Nimesh Lind DO  Visit Diagnosis:   1. Generalized abdominal pain     2. Myofascial pain           Patient was seen for 21 visits from 9/25/18 to 6/18/19 with 0 missed appointments.  The patient was progressing slowly goals and has demonstrated understanding of and independence in the home program for self-care, and progression to next steps.  She will initiate contact if questions or concerns arise.    Therapy will be discontinued at this time.  The patient will need a new referral to resume.    Thank you for your referral.  Bennie Hernandez  1/14/2021  9:37 PM

## 2021-06-14 NOTE — PROGRESS NOTES
[facility] Discharge Summary  Patient Name: Anais Kelly  Date: 1/14/2021  Referral Diagnosis: LBP  Referring provider: Lopez Gordon MD  Visit Diagnosis:   1. Chronic bilateral low back pain without sciatica     2. Myofascial pain         Patient was seen for initial evaluation only on 10/2/2020  The patient attended therapy initially, but did not finish the therapy sessions prescribed.  Goals were not fully achieved. Explanation for goals not achieved: did not make further follow ups.     Therapy will be discontinued at this time.  The patient will need a new referral to resume.    Thank you for your referral.  Bennie Hernandez  1/14/2021  9:34 PM

## 2021-06-14 NOTE — PROGRESS NOTES
Office Visit - Follow Up   Anais Kelly   70 y.o. female    Date of Visit: 1/22/2021    Chief Complaint   Patient presents with     Peripheral Neuropathy     Anxiety     Pain     Rib cage        -------------------------------------------------------------------------------------------------------------------------  Assessment and Plan    Establishing primary care for this 70-year-old woman, who used to work as a  for the [place] [employer], whose  is also my patient.  Issues are as follows: Insomnia with early morning awakenings, for which anxiety may be contributing, but there is some opportunity to improve her nighttime habits, okay to continue trazodone.  Anxiety symptoms worse the last 6 months, her irritable bowel syndrome symptoms contribute, could consider trying an SSRI such as Lexapro.  Generalized myalgias, which could be fibromyalgia, related to sleep disturbance.  Enlargement of the ascending aorta, 4.0 cm by CT scan July 15, 2020 with some atheromatous calcifications of the descending and abdominal aorta.  Coronary artery calcifications, asymptomatic.  Hyperlipidemia, has been on atorvastatin 10 mg a day but LDL cholesterol has been above 100 so therefore we will increase to 20 mg a day.  Small fiber neuropathy diagnosed by neurology, with paresthesias in her hands and feet, could consider trying gabapentin.  History of low vitamin B12 for which she has been getting B12 shots, but not consistently, I think she would absorb vitamin B12 just fine as an oral 1 mg tablet.  Osteoporosis with thoracic kyphosis noted on physical examination, I think she should consider going on antiresorptive medication, and that would probably be Prolia instead of alendronate because of her GI issues.  Irritable bowel syndrome with fecal urgency, gas, bloating, and history of abdominal wall pain related to complications from abdominal plasty surgery.  Diverticulosis seen on CT  scan, recommend staying on fiber supplement, she recalls colonoscopy done in approximately 2011.  Chronic low back pain without sciatica, with degenerative osteophytes and facet arthropathy and sacroiliac joint osteoarthrosis and bilateral hip osteoarthrosis noted on CT scan July 2020.  Bilateral knee osteoarthritis, had corticosteroid injection January 3, 2020.  History of right rotator cuff surgery approximately 2010, with bilateral glenohumeral osteoarthrosis involving both rotator cuff seen on CT scan July 15, 2020.  Post menopause status post hysterectomy on daily Premarin estrogen replacement.  Dry eye syndrome, takes Restasis eyedrops.    She does not need any laboratory work today.  I increased her atorvastatin 20 mg a day from previous dose of 10.  Osteoporosis program consultation requested.    Going issue issue:    Insomnia with early morning awakenings, for which anxiety may be contributing, but there is some opportunity to improve her nighttime habits, okay to continue trazodone.  She goes to bed around 11:00, but tends to wake up around 1 AM.  She may lie there in bed for an hour an hour and a half, and think anxious thoughts.  I suggested a different routine where if she wakes up, and is not able to fall back to sleep within about 10 to 15 minutes, then get out of bed.  Put on blue blocking glasses, leave the bedroom and watch the living room, read quietly for 8015 or 20 minutes, maybe have a little bit of warm milk, and when feeling drowsy then go back to the bedroom and try to go back to sleep.  She takes trazodone at bedtime, and I think it is fine to continue that.    Anxiety symptoms worse the last 6 months, her irritable bowel syndrome symptoms contribute, could consider trying an SSRI such as Lexapro.  She is to think about whether she wants to try medication to relieve anxiety symptoms.  Some of the anxious thoughts are topic focused, such as needing to stay close to bathrooms because of her  IBS.    Generalized myalgias, which could be fibromyalgia, related to sleep disturbance.  If we can get her sleeping better, perhaps some of the aches and pains might improve.    Enlargement of the ascending aorta, 4.0 cm by CT scan July 15, 2020 with some atheromatous calcifications of the descending and abdominal aorta.  Recommend rechecking the aorta in 2022.    Coronary artery calcifications, asymptomatic.  Not experiencing any symptoms of angina or heart failure.    Hyperlipidemia, has been on atorvastatin 10 mg a day but LDL cholesterol has been above 100 so therefore we will increase to 20 mg a day.  Because she has coronary artery calcifications as well as atherosclerosis involving her aorta, I think it makes sense to intensify her atorvastatin or to try the LDL cholesterol down to about 70.    Small fiber neuropathy diagnosed by neurology, with paresthesias in her hands and feet, could consider trying gabapentin.  For the neuropathy, she could consider using gabapentin for symptomatic relief.  Dose could start at 100 mg 3 times a day, and could escalate to 300 mg 3 times a day.  She will think about this, research gabapentin online, and get back to me.    History of low vitamin B12 for which she has been getting B12 shots, but not consistently, I think she would absorb vitamin B12 just fine as an oral 1 mg tablet.  By my review of her history, I do not see any reason why she would malabsorb vitamin B12.  I would like her to try taking oral vitamin B12 1 mg tablet, and then we can check a vitamin B12 level in a few months to see if she is maintaining a good level.    Osteoporosis with thoracic kyphosis noted on physical examination, I think she should consider going on antiresorptive medication, and that would probably be Prolia instead of alendronate because of her GI issues.      Irritable bowel syndrome with fecal urgency, gas, bloating, and history of abdominal wall pain related to complications from  abdominal plasty surgery.  She is learn which foods to avoid such as onions and spicy food.  She has worked with Dr. Nick at [facility].  He Has Her Using Imodium, Dicyclomine, Daily Omeprazole, Supplemented by Famotidine.    Diverticulosis seen on CT scan, recommend staying on fiber supplement, she recalls colonoscopy done in approximately 2011.      Chronic low back pain without sciatica, with degenerative osteophytes and facet arthropathy and sacroiliac joint osteoarthrosis and bilateral hip osteoarthrosis noted on CT scan July 2020.      Bilateral knee osteoarthritis, had corticosteroid injection January 3, 2020.      History of right rotator cuff surgery approximately 2010, with bilateral glenohumeral osteoarthrosis involving both rotator cuff seen on CT scan July 15, 2020.      Post menopause status post hysterectomy on daily Premarin estrogen replacement.      Dry eye syndrome, takes Restasis eyedrops.  --------------------------------------------------------------------------------------------------------------------------  History of Present Illness  This 70 y.o. old     Establishing primary care for this 70-year-old woman, who used to work as a  for the [place] [employer], whose  is also my patient.  Issues are as follows: Insomnia with early morning awakenings, for which anxiety may be contributing, but there is some opportunity to improve her nighttime habits, okay to continue trazodone.  Anxiety symptoms worse the last 6 months, her irritable bowel syndrome symptoms contribute, could consider trying an SSRI such as Lexapro.  Generalized myalgias, which could be fibromyalgia, related to sleep disturbance.  Enlargement of the ascending aorta, 4.0 cm by CT scan July 15, 2020 with some atheromatous calcifications of the descending and abdominal aorta.  Coronary artery calcifications, asymptomatic.  Hyperlipidemia, has been on atorvastatin 10 mg a day  but LDL cholesterol has been above 100 so therefore we will increase to 20 mg a day.  Small fiber neuropathy diagnosed by neurology, with paresthesias in her hands and feet, could consider trying gabapentin.  History of low vitamin B12 for which she has been getting B12 shots, but not consistently, I think she would absorb vitamin B12 just fine as an oral 1 mg tablet.  Osteoporosis with thoracic kyphosis noted on physical examination, I think she should consider going on antiresorptive medication, and that would probably be Prolia instead of alendronate because of her GI issues.  Irritable bowel syndrome with fecal urgency, gas, bloating, and history of abdominal wall pain related to complications from abdominal plasty surgery.  Diverticulosis seen on CT scan, recommend staying on fiber supplement, she recalls colonoscopy done in approximately 2011.  Chronic low back pain without sciatica, with degenerative osteophytes and facet arthropathy and sacroiliac joint osteoarthrosis and bilateral hip osteoarthrosis noted on CT scan July 2020.  Bilateral knee osteoarthritis, had corticosteroid injection January 3, 2020.  History of right rotator cuff surgery approximately 2010, with bilateral glenohumeral osteoarthrosis involving both rotator cuff seen on CT scan July 15, 2020.  Post menopause status post hysterectomy on daily Premarin estrogen replacement.  Dry eye syndrome, takes Restasis eyedrops.      Trouble staying asleep  Goes to bed 11 PM  Ed up awakening around 1 AM  Might be up 1.5 to 2 hours  Lying there    Anxiety   Last 6 months (latter half 2020  Anxious thoughts: no being able to access a bathroom, she has fecal urgemcy  Also anxiety since COVID    Generalized myalgias symptoms of mild severity.   She has been having some increased lower back stiffness, she is moderately overweight and feels her core strength has lessened and is interested in some physical therapy.  She is also having some right knee lateral  fascial or tendinitis type pain.  No knee swelling.      Ascending aorta enlargement (H)  7-15-20 CT  1.  Mild fusiform dilation of the ascending aorta measuring 4.0 x 4.1 cm. Mild descending and abdominal aorta atheromatous calcifications but no abdominal aortic or iliac artery aneurysm.  Mild atheromatous calcifications of the infrarenal abdominal aorta and common iliac arteries.    Agatston coronary artery calcium score between 200 and 399  No exertional type chest pain.   family history of coronary disease with her son at age 36 having a heart attack.  Cardiac CT scan in February 2020     total Agatston calcium score is 233. A calcium score in this range places the individual in the 75th percentile when compared to an age and gender matched control group and implies a high risk of cardiac events in the next ten years.    Hyperlipidemia  atorvastatin 10 mg a day.  Lab Results   Component Value Date    CHOL 209 (H) 09/02/2020    CHOL 269 (H) 07/08/2020    CHOL 273 (H) 01/28/2020     Lab Results   Component Value Date    HDL 64 09/02/2020    HDL 72 07/08/2020    HDL 82 01/28/2020     Lab Results   Component Value Date    LDLCALC 112 09/02/2020    LDLCALC 175 (H) 07/08/2020    LDLCALC 169 (H) 01/28/2020     Lab Results   Component Value Date    TRIG 164 (H) 09/02/2020    TRIG 112 07/08/2020    TRIG 110 01/28/2020     No components found for: CHOLHDL    Small fiber neuropathy diagnosed by neurology  She has a mild sensation of paresthesias on her hands and feet intermittently.    B12 deficiency.  She was not good about taking B12 shots monthly earlier this year.  Did get a B12 shot on July 8.  She is not been taking the oral B12.  Lab Results   Component Value Date    AVAGVQTV59 420 07/17/2018     Senile osteoporosis, thoracic kyphosis noted on physical exam  February 2020 DEXA scan with a L/R femur score -2.1/-2.6 with a spine score of -0.6.  Poor trabecular bone.  No fracture history.  She does not want to start  Fosamax at this time but may consider next year.  She is on a vitamin D supplement.  Takes vit d    Vitamin B12 deficiency (non anemic)  I will have her continue the monthly B12 shots.  I encouraged her to take the oral B12, especially if she is delayed getting IM B12 shot with a coronavirus outbreak.  Lab Results   Component Value Date    [id number] 420 07/17/2018     - cyanocobalamin 1000 MCG tablet; Take 1 tablet (1,000 mcg total) by mouth daily.  Dispense: 100 tablet; Refill: 2     Small fiber neuropathy (H); diagnosed by neurology  She was offered referral back to neurology and EMG evaluation but she declined at this time.  Continue B12 shots.     Possibility for sleep apnea but she did not want evaluation at this time.    Irritable bowel and adhesions, feal urge, gas bloating  Had tummy tuck with complications  Abdominoplasty  Complicated by seroma  Expensive surgery on abdominal wall    dicyclomine (BENTYL) 10 MG capsule  famotidine (PEPCID) 20 MG tablet as needed  omeprazole (PRILOSEC) 20 MG capsule  loperamide (IMODIUM) 2 mg capsule    Diverticulosis of large intestine without hemorrhage  Seen on CT scan last July.  She was given warnings about diverticulitis signs and symptoms and to seek medical attention immediately if that occurs.  Her colonoscopy was about 9 years ago and otherwise negative.  No family history of colon cance     She is having some difficulty getting on a fiber supplement.  I encouraged her to use the 1 scoop twice a day and titrate up and adjust as needed.  Could use stool softeners and/or MiraLAX as well.    Chronic bilateral low back pain without sciatica  She complained of some chronic lower back pain she has moderately obese.  She could benefit from weight loss and core muscle strengthening.  Refer to physical therapy.  Thoracolumbar degenerative osteophytes are present. Lower lumbar facet arthropathy.   Right greater than left SI joint   osteoarthrosis. Bilateral hip  osteoarthrosis    Bilateral knee osteoarthritis, lateral corticosteroid injections January 3, 2020     Bilateral glenohumeral osteoarthrosis with fat atrophy of rotator cuff musculature, slightly worse on the right.   CT 7-  Has right shoudler surgery approx 2010    Status post hysterectomy on Premarin.    PREMARIN 0.625 mg tablet    Negative mammogram in January.  Yearly mammogram in January    Dry eyes  RESTASIS 0.05 % ophthalmic emulsion    Immunization History   Administered Date(s) Administered     Influenza high dose,seasonal,PF, 65+ yrs 10/22/2019     Influenza, Seasonal, Inj PF IIV3 11/15/1996, 09/29/1997, 07/27/1998, 12/12/2000, 10/01/2002, 10/01/2003, 11/02/2007, 09/09/2009     Influenza, inj, historic,unspecified 10/01/2014     Influenza,seasonal, Inj IIV3 11/10/2006, 10/22/2010, 10/11/2011, 10/09/2012     Pneumo Conj 13-V (2010&after) 06/29/2015     Pneumo Polysac 23-V 04/15/2013     Td, adult adsorbed, PF 03/11/1999, 05/18/2020     Tdap 01/26/2009     ZOSTER, LIVE 10/16/2009     ZOSTER, RECOMBINANT, IM 07/24/2018, 10/20/2018         Wt Readings from Last 3 Encounters:   09/02/20 156 lb (70.8 kg)   07/08/20 152 lb (68.9 kg)   01/28/20 154 lb (69.9 kg)     BP Readings from Last 3 Encounters:   01/22/21 110/84   09/02/20 128/82   07/08/20 124/80       Lab Results   Component Value Date    WBC 6.0 07/08/2020    HGB 14.3 07/08/2020    HCT 42.3 07/08/2020     07/08/2020    CHOL 209 (H) 09/02/2020    TRIG 164 (H) 09/02/2020    HDL 64 09/02/2020    ALT 13 09/02/2020    AST 17 09/02/2020     09/02/2020    K 4.3 09/02/2020     09/02/2020    CREATININE 0.77 09/02/2020    BUN 16 09/02/2020    CO2 27 09/02/2020    TSH 3.02 07/17/2018    INR 1.02 05/16/2019        Review of Systems: A comprehensive review of systems was negative except as noted.  ---------------------------------------------------------------------------------------------------------------------------    Medications,  "Allergies, Social, and Problem List   Current Outpatient Medications   Medication Sig Dispense Refill     atorvastatin (LIPITOR) 10 MG tablet Take 1 tablet (10 mg total) by mouth daily. 90 tablet 3     cholecalciferol, vitamin D3, (VITAMIN D3) 5,000 unit Tab Take 5,000 Units by mouth daily.              dicyclomine (BENTYL) 10 MG capsule Take 10 mg by mouth 2 (two) times a day.              erythromycin ophthalmic ointment Administer 1 application to both eyes at bedtime.       famotidine (PEPCID) 20 MG tablet Take 20 mg by mouth daily as needed.       fluticasone propionate (FLONASE ALLERGY RELIEF) 50 mcg/actuation nasal spray 2 sprays into each nostril daily as needed.       loperamide (IMODIUM) 2 mg capsule Take 2 tablets (4 mg) by mouth after 1st loose stool and 1 tablet (2 mg) after each next bowel movement. Do not exceed 16 mg in 24 hours.  6     multivitamin therapeutic tablet Take 1 tablet by mouth daily.       omeprazole (PRILOSEC) 20 MG capsule Take 20 mg by mouth daily before breakfast.       PREMARIN 0.625 mg tablet Take 1 tablet by mouth once daily 90 tablet 3     RESTASIS 0.05 % ophthalmic emulsion Administer 1 drop to both eyes daily.  3     traZODone (DESYREL) 50 MG tablet TAKE 1 TO 2 TABLETS BY MOUTH 1 HOUR BEFORE BEDTIME 180 tablet 3     cyanocobalamin 1000 MCG tablet Take 1 tablet (1,000 mcg total) by mouth daily. 100 tablet 2     Current Facility-Administered Medications   Medication Dose Route Frequency Provider Last Rate Last Admin     cyanocobalamin injection 1,000 mcg  1,000 mcg Intramuscular Q30 Days Lopez Gordon MD   1,000 mcg at 12/15/20 1336     Allergies   Allergen Reactions     Acetaminophen Nausea Only     Amoxicillin Diarrhea     Celebrex [Celecoxib] Other (See Comments)     \"stomach upset\"     Onion      Vicodin [Hydrocodone-Acetaminophen] Nausea Only     Zolpidem Tartrate      Codeine Rash     Diphenhydramine Anxiety     hyper     Zolpidem Anxiety and Palpitations     Social " History     Tobacco Use     Smoking status: Never Smoker     Smokeless tobacco: Never Used   Substance Use Topics     Alcohol use: Yes     Comment: 1 a month     Drug use: No     Patient Active Problem List   Diagnosis     Irritable bowel syndrome with diarrhea     Benign essential hypertension     Insomnia     Small fiber neuropathy (H); diagnosed by neurology     Abdominal pain, chronic, bilateral lower quadrant     Controlled substance agreement signed 5/9/19- Ativan 1mg for Insomnia     Hormone replacement therapy, postmenopausal     Ischemia of finger        Reviewed, reconciled and updated       Physical Exam   General Appearance: Very pleasant, normal mental status, recovery, bit overweight, good blood pressure.    /84 (Patient Site: Right Arm, Patient Position: Sitting, Cuff Size: Adult Regular)   Pulse 84   Temp 98.3  F (36.8  C) (Oral)   LMP 07/10/1984   SpO2 96%     Lungs clear  Heart regular rate rhythm  Abdomen nontender, well-healed old scars  + thoracic kyphosis.  Extremities no edema in the legs.     Additional Information   I spent 45 minutes face time with the patient, with > 50% counseling, explaining and discussing with the patient the issues enumerated in the Assessment and Plan section of this note and answering questions. Afterwards, the patient was given a printout of the AVS, with attention to the content in the Patient Instruction section.       Tesfaye Jimenez MD

## 2021-06-15 NOTE — PROGRESS NOTES
Assessment and Plan:   Patient instructions:  Start a regular exercise routine with 20 minutes of walking aerobic type exercise daily.  Consider a Ryzing, Sarata video for exercise.    Consider a sleep study in the future to evaluate for sleep apnea.  Sleep apnea can be associated with neuropathy.    Try capsaicin cream on your feet in the evening to help reduce neuropathy symptoms.    You may be due for your screening colonoscopy this year.  You may be contacted by [facility] to set that up.    I would like you to have a repeat ultrasound of your abdominal aorta this summer, prior to considering a colonoscopy.  You could also consider Cologuard test for colon cancer screening.    Calcium citrate 500 mg twice a day is recommended.    We will discuss osteoporosis treatment options in May with the osteoporosis specialist here in clinic.    Proceed with yearly mammogram in April, as scheduled.    Follow-up with me in July.  Nonfasting follow-up appointment.  I will plan to have you repeat your ultrasound for your aortic aneurysm in July.    Check your [facility] portal to get your lab results from today.    1. Encounter for wellness examination in adult  Plan emphasis for patient today was to get on a regular exercise routine, we did discuss some options.  She eventually plans to get back to the gym when able.    Moderate suspicion for sleep apnea but she denies snoring or daytime somnolence and does not want to go through with a sleep apnea evaluation.    She did confirm full CODE STATUS wishes.  - Full code    She saw ophthalmology December 3, no problems identified.    Status post hysterectomy on Premarin supplement.    She has her yearly mammogram scheduled April 9.    She had a negative colonoscopy in 2011.  I do not have the actual report.  No family history of colon cancer.  Patient is hesitant to do another colonoscopy at this time.  I did discuss Cologuard as an option, but also discussed  possible false negatives and false positives with Cologuard.    I will see if [facility] contact her later this year, otherwise we can discuss colon cancer screening plan in July.  I will plan to have her repeat the abdominal ultrasound for AAA measurement prior to any colonoscopy.    Up-to-date on immunizations and got her first COVID-19 shot yesterday.    2. Coronary artery calcification seen on CAT scan  Son had a heart attack.  February 2020 cardiac CT scan calcium score 233.    No chest pain or cardiac event.    She is never smoked.    She has had some borderline blood pressures in clinic but overall blood pressure has been normal not started on medication.    Started on Lipitor in July    3. Hypercholesterolemia  Tolerating Lipitor without generalized myalgias or right upper quadrant pain.    July 2020  before starting Lipitor.    September 2020  and HDL 64 with normal liver test and CK.    Goal LDL less than 130.  - Lipid Cascade    4. Abdominal aortic aneurysm (AAA) without rupture (H)  Incidental finding on CT scan July 2020.  She has been having some chronic lower abdominal pain consistent with irritable bowel, but she still does not wish to take Citrucel.    CT scan showed a 4.0 x 4.1 AAA.    Patient does not wish to get sleep apnea evaluation.  Blood pressure has been okay in clinic.    Is never smoked.    Repeat abdominal ultrasound in July of this year.    5. Small fiber neuropathy (H); diagnosed by neurology  Unclear etiology.  She denies significant alcohol.  I suspect sleep apnea but she does not feel that the case does not want to have a sleep apnea evaluation.    She does take trazodone at night.    She does have intact fine filament sensation and vibratory sensation on her feet.  Gait within normal limits.    We discussed capsaicin cream in the evening.  I also discussed gabapentin option but she does not wish to consider that at this time.    She was diagnosed with B12 deficiency  based on a high MMA level back in 2018 but her B12 level was normal.  She is no longer getting IM B12.  She is taking 1000 mcg of oral B12 daily.    We will check levels to see if that adequate today.    She declined referral back to neurology for repeat EMG, but did discuss that is an option.    No foot sores or preulcerative calluses.  - Vitamin B12  - Methylmalonic Acid (MMA), Quantitative  - capsaicin (ZOSTRIX) 0.025 % cream; Apply to feet in the evening to help reduce neuropathy discomfort  Dispense: 60 g; Refill: 6    6. Age-related osteoporosis without current pathological fracture  February 2020 DEXA scan reviewed by me today with a femur score of -2.1/-2.6.  Spine score -0.6.  Poor trabecular bone.  No fracture history.    She does have some dental issues and loose teeth and was just seen by her dentist.  I did discuss Fosamax option but she is not interested after I talked about the risk of osteonecrosis of the jaw.  She denies significant swallowing problems.    She does want to investigate option for Prolia and does have an appointment with Dr. ROD of osteoporosis clinic in May.    I stressed the importance of daily weightbearing exercise.    I recommended calcium citrate 500 mg twice daily in addition to her vitamin D    7. Vitamin D deficiency, with a low level in 2014  5000 IU a day.  - Vitamin D, Total (25-Hydroxy)    8. Encounter for therapeutic drug monitoring    - Comprehensive Metabolic Panel  - HM2(CBC w/o Differential)    9. Routine general medical examination at a health care facility  Some mild right carpal tunnel symptoms.  I did discuss using a brace at night if needed.  Follow-up if worsening symptoms.    The patient's current medical problems were reviewed.    I have had an Advance Directives discussion with the patient.  The following health maintenance schedule was reviewed with the patient and provided in printed form in the after visit summary:   Health Maintenance   Topic Date Due      HEPATITIS C SCREENING  1950     COVID-19 Vaccine (2 of 2 - Moderna series) 03/09/2021     MAMMOGRAM  01/14/2022     MEDICARE ANNUAL WELLNESS VISIT  02/10/2022     FALL RISK ASSESSMENT  02/10/2022     COLORECTAL CANCER SCREENING  10/31/2024     ADVANCE CARE PLANNING  01/28/2025     LIPID  09/02/2025     TD 18+ HE  05/18/2030     DEXA SCAN  02/24/2035     Pneumococcal Vaccine: 65+ Years  Completed     INFLUENZA VACCINE RULE BASED  Completed     ZOSTER VACCINES  Completed     Pneumococcal Vaccine: Pediatrics (0 to 5 Years) and At-Risk Patients (6 to 64 Years)  Aged Out     HEPATITIS B VACCINES  Aged Out        Subjective:   Chief Complaint: Anais Kelly is an 70 y.o. female here for an Annual Wellness visit.   HPI: Visit dependently with .  No recent falls.    He does complain of worsening burning sensation on her feet at night consistent with her small fiber neuropathy.  She changed to an oral B12 supplement, see above.    She falls asleep quickly at night, but denies daytime sleepiness during the day.  She states she is never been a snorer.    No palpitations or history of arrhythmia.  No chest pain or increasing shortness of breath with activity, but she has not been as active this winter with the weather change.    Chronic left lower abdominal pain consistent with irritable bowel.  Was seen by GI last year.  Fiber supplement was recommended but she again states she does not want to take fiber supplement.  Apparently in the past has had some loose stools after starting fiber supplement.    Last colonoscopy 2011 - and reports no family history of colon cancer.    No blood in stool.    Mild urinary frequency symptoms but no new incontinence or recent UTI.  Status post hysterectomy.    Occasional tension type headaches and does clench her jaw intermittently.  She was talking with her dentist recently about getting a oral appliance.    Review of Systems: Otherwise negative please see above.  The rest of  the review of systems are negative for all systems.    Patient Care Team:  Lopez Gordon MD as PCP - General (Internal Medicine)  Lissy Ortega PharmD as Pharmacist (Pharmacist)  Lopez Gordon MD as Assigned PCP     Patient Active Problem List   Diagnosis     Irritable bowel syndrome with diarrhea     Benign essential hypertension     Insomnia     Small fiber neuropathy (H); diagnosed by neurology     Abdominal pain, chronic, bilateral lower quadrant     Hormone replacement therapy, postmenopausal     Ischemia of finger     Age-related osteoporosis without current pathological fracture     Ascending aorta dilatation (H)     Coronary artery calcification seen on CAT scan     Hyperlipidemia LDL goal <70     No past medical history on file.   Past Surgical History:   Procedure Laterality Date     HYSTERECTOMY  1984     IR EXTREMITY ANGIOGRAM LEFT  5/17/2019     OOPHORECTOMY Bilateral 1984      Family History   Problem Relation Age of Onset     Cancer Father 72        Bladder Cancer     Multiple sclerosis Mother      No Medical Problems Brother       Social History     Socioeconomic History     Marital status:      Spouse name: Not on file     Number of children: Not on file     Years of education: Not on file     Highest education level: Not on file   Occupational History     Not on file   Social Needs     Financial resource strain: Not on file     Food insecurity     Worry: Not on file     Inability: Not on file     Transportation needs     Medical: Not on file     Non-medical: Not on file   Tobacco Use     Smoking status: Never Smoker     Smokeless tobacco: Never Used   Substance and Sexual Activity     Alcohol use: Yes     Comment: 1 a month     Drug use: No     Sexual activity: Not on file   Lifestyle     Physical activity     Days per week: Not on file     Minutes per session: Not on file     Stress: Not on file   Relationships     Social connections     Talks on phone: Not on file     Gets together:  Not on file     Attends Druze service: Not on file     Active member of club or organization: Not on file     Attends meetings of clubs or organizations: Not on file     Relationship status: Not on file     Intimate partner violence     Fear of current or ex partner: Not on file     Emotionally abused: Not on file     Physically abused: Not on file     Forced sexual activity: Not on file   Other Topics Concern     Not on file   Social History Narrative    Retired.      for 52 years and has 3 boys.      Current Outpatient Medications   Medication Sig Dispense Refill     atorvastatin (LIPITOR) 20 MG tablet Take 1 tablet (20 mg total) by mouth daily. 90 tablet 3     cholecalciferol, vitamin D3, (VITAMIN D3) 5,000 unit Tab Take 5,000 Units by mouth daily.              cyanocobalamin 1000 MCG tablet Take 1 tablet (1,000 mcg total) by mouth daily. 100 tablet 2     dicyclomine (BENTYL) 10 MG capsule Take 10 mg by mouth 2 (two) times a day.              erythromycin ophthalmic ointment Administer 1 application to both eyes at bedtime.       famotidine (PEPCID) 20 MG tablet Take 20 mg by mouth daily as needed.       fluticasone propionate (FLONASE ALLERGY RELIEF) 50 mcg/actuation nasal spray 2 sprays into each nostril daily as needed.       loperamide (IMODIUM) 2 mg capsule Take 2 tablets (4 mg) by mouth after 1st loose stool and 1 tablet (2 mg) after each next bowel movement. Do not exceed 16 mg in 24 hours.  6     multivitamin therapeutic tablet Take 1 tablet by mouth daily.       omeprazole (PRILOSEC) 20 MG capsule Take 20 mg by mouth daily before breakfast.       PREMARIN 0.625 mg tablet Take 1 tablet by mouth once daily 90 tablet 3     RESTASIS 0.05 % ophthalmic emulsion Administer 1 drop to both eyes daily.  3     traZODone (DESYREL) 50 MG tablet TAKE 1 TO 2 TABLETS BY MOUTH 1 HOUR BEFORE BEDTIME 180 tablet 3     capsaicin (ZOSTRIX) 0.025 % cream Apply to feet in the evening to help reduce neuropathy  "discomfort 60 g 6     No current facility-administered medications for this visit.       Objective:   Vital Signs:   Visit Vitals  /86 (Patient Site: Right Arm, Patient Position: Sitting, Cuff Size: Adult Large)   Pulse 72   Temp (!) 96.1  F (35.6  C) (Other) Comment (Src): forehead   Ht 5' 6.34\" (1.685 m)   Wt 159 lb 14.4 oz (72.5 kg)   LMP 07/10/1984   BMI 25.55 kg/m           VisionScreening:  No exam data present     PHYSICAL EXAM  Alert oriented x3 with good mood and affect.  Clock face drawing normal and word recall normal.  Mobility exam within normal limits.  Pupils irises equal and reactive.  Extraocular muscles intact.  External ears and nose exam is normal.  Tympanic membranes are normal.  No cervical or supraclavicular or axillary adenopathy.  No JVD and no carotid bruits.  No thyromegaly or nodularity.  Lungs are clear to auscultation with good respiratory excursion.  Mild kyphosis.  Heart is regular without murmur rub or gallop.  +1 pedal pulses bilaterally.  No ankle edema.  Abdomen is mildly overweight, nontender except for some mild left lower quadrant tenderness, but belly is soft and otherwise feels normal.  No pulsatile abdominal mass was felt on my mid abdomen palpation.  No hepatosplenomegaly.  Fine filament sensation was intact in both feet bilaterally and no preulcerative calluses.  Vibratory sensation was intact on both feet.  She does not have DTRs in her ankles bilaterally, but does have +1 DTR on her knee bilaterally.  Gait within normal limits.  Skin exam without suspicious skin lesions.  Breast exam done with assistant in room shows bilateral fibronodular tissue that is mobile and rubbery and symmetric.  No abnormal nodule palpated.  No axillary adenopathy.  Breasts are normal to visual inspection.    Assessment Results 2/10/2021   Activities of Daily Living No help needed   Instrumental Activities of Daily Living No help needed   Get Up and Go Score -   Mini Cog Total Score 5 "   Some recent data might be hidden     A Mini-Cog score of 0-2 suggests the possibility of dementia, score of 3-5 suggests no dementia    Identified Health Risks:     She is at risk for lack of exercise and has been provided with information to increase physical activity for the benefit of her well-being.  Information on urinary incontinence and treatment options given to patient.  The patient's PHQ-9 score is consistent with mild depression. She was provided with information regarding depression and was advised to schedule a follow up appointment in  weeks to further address this issue.  Patient's advanced directive was discussed and I am comfortable with the patient's wishes.

## 2021-06-15 NOTE — TELEPHONE ENCOUNTER
Reason for Call: Request for an order or referral:    Order or referral being requested: referral    Date needed: at your convenience    Has the patient been seen by the PCP for this problem? YES    Additional comments: needs new order Physical Therapy went once or twice but now would like to return to [facility] for low back pain    Phone number Patient can be reached at:  Home number on file [phone] (home)    Best Time:  any    Can we leave a detailed message on this number?  Yes    Call taken on 3/9/2021 at 2:43 PM by Laura L Goldberg

## 2021-06-16 PROBLEM — K58.0 IRRITABLE BOWEL SYNDROME WITH DIARRHEA: Status: ACTIVE | Noted: 2018-07-17

## 2021-06-16 PROBLEM — I10 BENIGN ESSENTIAL HYPERTENSION: Status: ACTIVE | Noted: 2018-07-17

## 2021-06-16 PROBLEM — E78.5 HYPERLIPIDEMIA LDL GOAL <70: Status: ACTIVE | Noted: 2021-01-22

## 2021-06-16 PROBLEM — I25.10 CORONARY ARTERY CALCIFICATION SEEN ON CAT SCAN: Status: ACTIVE | Noted: 2021-01-22

## 2021-06-16 PROBLEM — R10.32 ABDOMINAL PAIN, CHRONIC, BILATERAL LOWER QUADRANT: Status: ACTIVE | Noted: 2018-07-17

## 2021-06-16 PROBLEM — I99.8 ISCHEMIA OF FINGER: Status: ACTIVE | Noted: 2019-05-16

## 2021-06-16 PROBLEM — R10.31 ABDOMINAL PAIN, CHRONIC, BILATERAL LOWER QUADRANT: Status: ACTIVE | Noted: 2018-07-17

## 2021-06-16 PROBLEM — G47.00 INSOMNIA: Status: ACTIVE | Noted: 2018-07-17

## 2021-06-16 PROBLEM — G62.9 SMALL FIBER NEUROPATHY: Status: ACTIVE | Noted: 2018-07-17

## 2021-06-16 PROBLEM — I77.810 ASCENDING AORTA DILATATION (H): Status: ACTIVE | Noted: 2021-01-22

## 2021-06-16 PROBLEM — Z79.890 HORMONE REPLACEMENT THERAPY, POSTMENOPAUSAL: Status: ACTIVE | Noted: 2019-05-13

## 2021-06-16 PROBLEM — M81.0 AGE-RELATED OSTEOPOROSIS WITHOUT CURRENT PATHOLOGICAL FRACTURE: Status: ACTIVE | Noted: 2021-01-22

## 2021-06-16 PROBLEM — G89.29 ABDOMINAL PAIN, CHRONIC, BILATERAL LOWER QUADRANT: Status: ACTIVE | Noted: 2018-07-17

## 2021-06-16 NOTE — PROGRESS NOTES
[facility] Daily Progress     Patient Name: Anais Kelly  Date: 3/31/2021  Visit #: 2  PTA visit #:     Referral Diagnosis: LBP   Referring provider: Lopez Gordon MD  Visit Diagnosis:     ICD-10-CM    1. Chronic bilateral low back pain without sciatica  M54.5     G89.29    2. Pain in joint involving right ankle and foot  M25.571    3. Chronic pain of both knees  M25.561     M25.562     G89.29    4. Myofascial pain  M79.18           Assessment:     Patient is benefitting from skilled physical therapy and is making steady progress toward functional goals.  Patient is appropriate to continue with skilled physical therapy intervention, as indicated by initial plan of care.   There was very good release of fascial tensions today. This should help to not only improve her LBP but also her RLE pain as well.     Goal Status:  Pt. will demonstrate/verbalize independence in self-management of condition in : 12 weeks  Pt. will report decreased intensity, frequency of : Pain;in 12 weeks  Pt. will be able to walk : 30 minutes;on even surfaces;with less difficulty;for household mobility;for community mobility;for exercise/recreation;in 12 weeks    Patient will decrease : TREVER score;by _ points;for improved quality of function;for improved quality of life;in 12 weeks  by ___ points: 10      Plan / Patient Education:     Continue with initial plan of care.      Subjective:     Pain Ratin  The back has not been so good. She is having a hard time getting out of sitting in a sofa. She does feel like it is the back/knees and strength.   She felt a little better after the last treatment but with her knees being so bad last week it caused her back to be more painful as well.   The knees are doing better after the injections but are still very sore.   The R hip/bursa area is more painful lately too.     Objective:     Visc, art fascial tensions.     Treatment Today   3/31/2021   TREATMENT MINUTES COMMENTS   Evaluation      Self-care/ Home management     Manual therapy .25 Fascial release using Strain-Counterstrain of B lumbar/lower thor MES-V, RLE-A   Neuromuscular Re-education 15 Recip inhib of visc, art fascia   Therapeutic Activity     Therapeutic Exercises 15 AA/PROM to BLE, LT spine, ribs   Gait training     Modality__________________                Total 55    Blank areas are intentional and mean the treatment did not include these items.       Bennie Hernandez  3/31/2021

## 2021-06-16 NOTE — PROGRESS NOTES
[facility] Daily Progress     Patient Name: Anais Kelly  Date: 2021  Visit #: 2  PTA visit #:     Referral Diagnosis: LBP   Referring provider: Lopez Gordon MD  Visit Diagnosis:     ICD-10-CM    1. Chronic bilateral low back pain without sciatica  M54.5     G89.29    2. Pain in joint involving right ankle and foot  M25.571    3. Chronic pain of both knees  M25.561     M25.562     G89.29    4. Myofascial pain  M79.18           Assessment:     Patient is benefitting from skilled physical therapy and is making steady progress toward functional goals.  Patient is appropriate to continue with skilled physical therapy intervention, as indicated by initial plan of care.   There was very good release of fascial tensions today. This should help to improve both LE and LBP.    Goal Status:  Pt. will demonstrate/verbalize independence in self-management of condition in : 12 weeks  Pt. will report decreased intensity, frequency of : Pain;in 12 weeks  Pt. will be able to walk : 30 minutes;on even surfaces;with less difficulty;for household mobility;for community mobility;for exercise/recreation;in 12 weeks    Patient will decrease : TREVER score;by _ points;for improved quality of function;for improved quality of life;in 12 weeks  by ___ points: 10      Plan / Patient Education:     Continue with initial plan of care.      Subjective:     Pain Ratin  She did feel better after the last treatment.      Objective:     MS, LV, visc fascial tensions.     Treatment Today   2021   TREATMENT MINUTES COMMENTS   Evaluation     Self-care/ Home management     Manual therapy .25 Fascial release using Strain-Counterstrain of BLE LF-MS, B pelvis/femur (P)-MS, R hip-LV, B lower abd-V   Neuromuscular Re-education 15 Recip inhib of visc, MS, LV fascia   Therapeutic Activity     Therapeutic Exercises 15 AA/PROM to BLE, LT spine, ribs   Gait training     Modality__________________                Total 55    Blank areas  are intentional and mean the treatment did not include these items.       Bennie Hernandez  4/12/2021

## 2021-06-16 NOTE — PROGRESS NOTES
[facility] Daily Progress     Patient Name: Anais Kelly  Date: 4/21/2021  Visit #: 4  PTA visit #:     Referral Diagnosis: LBP   Referring provider: Lopez Gordon MD  Visit Diagnosis:     ICD-10-CM    1. Chronic bilateral low back pain without sciatica  M54.5     G89.29    2. Pain in joint involving right ankle and foot  M25.571    3. Chronic pain of both knees  M25.561     M25.562     G89.29    4. Myofascial pain  M79.18           Assessment:     Patient is benefitting from skilled physical therapy and is making steady progress toward functional goals.  Patient is appropriate to continue with skilled physical therapy intervention, as indicated by initial plan of care.   She had good understanding of HEP given today and importance of them.      Goal Status:  Pt. will demonstrate/verbalize independence in self-management of condition in : 12 weeks  Pt. will report decreased intensity, frequency of : Pain;in 12 weeks  Pt. will be able to walk : 30 minutes;on even surfaces;with less difficulty;for household mobility;for community mobility;for exercise/recreation;in 12 weeks    Patient will decrease : TREVER score;by _ points;for improved quality of function;for improved quality of life;in 12 weeks  by ___ points: 10      Plan / Patient Education:     Continue with initial plan of care.      Subjective:     Pain Rating: 3 in lower back, 4 in the abdomen.   She did feel much better after the last treatment. She is looking at trying to get started on some HEP as well.   The lower back does feel ok during the day but when waking up in the morning or after sitting for too long it will increase.      Objective:     MS fascial tensions.   .    Exercises:  Exercise #1: pelvic rocking with posture education  Comment #1: lower trunk rotation  Exercise #2: single knee to chest  Comment #2: figure 4 piriformis stretch        Treatment Today   4/21/2021   TREATMENT MINUTES COMMENTS   Evaluation     Self-care/ Home  management     Manual therapy 15 Fascial release using Strain-Counterstrain of R lower leg (P)-MS    Neuromuscular Re-education     Therapeutic Activity     Therapeutic Exercises 40 AA/PROM to BLE, LT spine, ribs   Gait training     Modality__________________                Total 55    Blank areas are intentional and mean the treatment did not include these items.       Bennie Hernandez  4/21/2021

## 2021-06-17 NOTE — PROGRESS NOTES
[facility] Daily Progress     Patient Name: Anais Kelly  Date: 2021  Visit #: 6    PTA visit #:     Referral Diagnosis: LBP   Referring provider: Lopez Gordon MD  Visit Diagnosis:     ICD-10-CM    1. Chronic bilateral low back pain without sciatica  M54.5     G89.29    2. Pain in joint involving right ankle and foot  M25.571    3. Chronic pain of both knees  M25.561     M25.562     G89.29    4. Myofascial pain  M79.18           Assessment:     Patient is benefitting from skilled physical therapy and is making steady progress toward functional goals.  Patient is appropriate to continue with skilled physical therapy intervention, as indicated by initial plan of care.   She was very tender in her RLE today and did still feel pain post treatment. There was good release of fascial tensions today which should help with drainage and improve her overall sx. She was also instructed to          Goal Status:  Pt. will demonstrate/verbalize independence in self-management of condition in : 12 weeks  Pt. will report decreased intensity, frequency of : Pain;in 12 weeks  Pt. will be able to walk : 30 minutes;on even surfaces;with less difficulty;for household mobility;for community mobility;for exercise/recreation;in 12 weeks    Patient will decrease : TREVER score;by _ points;for improved quality of function;for improved quality of life;in 12 weeks  by ___ points: 10      Plan / Patient Education:     Continue with initial plan of care.      Subjective:     Pain Ratin-6 in the heel/knee .   The R heel is still really troublesome. It was better for a little while after the last treatment.  She does feel like this will affect her all the way up the leg.     Objective:     MS, LV, art fascial tensions.       Treatment Today   2021   TREATMENT MINUTES COMMENTS   Evaluation     Self-care/ Home management     Manual therapy 25 Fascial release using Strain-Counterstrain of RLE-Art, R lower leg-LV,  calcaneus (P)-MS    Neuromuscular Re-education 15 Recip inhib of MS, LV, art  fascia   Therapeutic Activity     Therapeutic Exercises 15 AA/PROM to RLE, pelvis   Gait training     Modality__________________                Total 55    Blank areas are intentional and mean the treatment did not include these items.       Bennie Hernandez  5/11/2021

## 2021-06-17 NOTE — PROGRESS NOTES
[facility] Daily Progress     Patient Name: Anais Kelly  Date: 2021  Visit #: 6    PTA visit #:     Referral Diagnosis: LBP   Referring provider: Lopez Gordon MD  Visit Diagnosis:     ICD-10-CM    1. Chronic bilateral low back pain without sciatica  M54.5     G89.29    2. Pain in joint involving right ankle and foot  M25.571    3. Chronic pain of both knees  M25.561     M25.562     G89.29    4. Myofascial pain  M79.18           Assessment:     Patient is benefitting from skilled physical therapy and is making steady progress toward functional goals.  Patient is appropriate to continue with skilled physical therapy intervention, as indicated by initial plan of care.   There was good release of fascial tensions with treatment.  She did still have some pain in the knee but her tensions did improve.         Goal Status:  Pt. will demonstrate/verbalize independence in self-management of condition in : 12 weeks  Pt. will report decreased intensity, frequency of : Pain;in 12 weeks  Pt. will be able to walk : 30 minutes;on even surfaces;with less difficulty;for household mobility;for community mobility;for exercise/recreation;in 12 weeks    Patient will decrease : TREVER score;by _ points;for improved quality of function;for improved quality of life;in 12 weeks  by ___ points: 10      Plan / Patient Education:     Continue with initial plan of care.      Subjective:     Pain Ratin-6 in the heel/knee .   She is just as bad as she has been in a long time today. The neuropathy has been really bad and that hasn't stopped lately. The R knee is more swollen and the R foot is very tender. Yesterday it was 3/4 of what it is today.  She hasn't been taking Ibuprofen due to stomach issues which does add to her pain as well.   She did get a new pair of shoes a week ago.   She has been exercising more. She is sore in her hamstring.   She did feel much better for a few days after the last treatment.      Objective:     MS, LV fascial tensions.       Treatment Today   5/18/2021   TREATMENT MINUTES COMMENTS   Evaluation     Self-care/ Home management     Manual therapy 25 Fascial release using Strain-Counterstrain of BLE/lumbopelvic-LV, R lower leg-SF   Neuromuscular Re-education 15 Recip inhib of MS, LV fascia   Therapeutic Activity     Therapeutic Exercises 15 AA/PROM to BLE, pelvis, L-spine   Gait training     Modality__________________                Total 55    Blank areas are intentional and mean the treatment did not include these items.       Bennie Hernandez  5/18/2021

## 2021-06-17 NOTE — PROGRESS NOTES
"[facility] Daily Progress     Patient Name: Anais Kelly  Date: 2021  Visit #: 8    PTA visit #:     Referral Diagnosis: LBP   Referring provider: Lopez Gordon MD  Visit Diagnosis:     ICD-10-CM    1. Chronic bilateral low back pain without sciatica  M54.5     G89.29    2. Pain in joint involving right ankle and foot  M25.571    3. Chronic pain of both knees  M25.561     M25.562     G89.29    4. Myofascial pain  M79.18           Assessment:     Patient is benefitting from skilled physical therapy and is making steady progress toward functional goals.  Patient is appropriate to continue with skilled physical therapy intervention, as indicated by initial plan of care.   She did have stiffness from lying during treatment but did have good release of fascial tensions and point tenderness with treatment. This should hopefully allow her to improve her function futher.          Goal Status:  Pt. will demonstrate/verbalize independence in self-management of condition in : 12 weeks  Pt. will report decreased intensity, frequency of : Pain;in 12 weeks  Pt. will be able to walk : 30 minutes;on even surfaces;with less difficulty;for household mobility;for community mobility;for exercise/recreation;in 12 weeks    Patient will decrease : TREVER score;by _ points;for improved quality of function;for improved quality of life;in 12 weeks  by ___ points: 10      Plan / Patient Education:     Continue with initial plan of care.      Subjective:     Pain Ratin-6 in the heel/knee .   She has been getting some cramps in her lower hamstring area.   She was really good for a day or so after the last visit. The pain did return but not to the point it was before which is good.   It is really sore on the outside of the R knee and into the foot.   The R heel is \"burning'.     Objective:     MS, art, neural fascial tensions.       Treatment Today   2021   TREATMENT MINUTES COMMENTS   Evaluation     Self-care/ Home " management     Manual therapy 25 Fascial release using Strain-Counterstrain of RLE-N, R femur/knee (P)-MS, R knee-A   Neuromuscular Re-education 15 Recip inhib of MS, art, neural fascia   Therapeutic Activity     Therapeutic Exercises 15 AA/PROM to RLE, pelvis, L-spine   Gait training     Modality__________________                Total 55    Blank areas are intentional and mean the treatment did not include these items.       Bennie Hernandez  5/25/2021

## 2021-06-17 NOTE — PROGRESS NOTES
[facility] Daily Progress     Patient Name: Anais Kelly  Date: 5/4/2021  Visit #: 5  PTA visit #:     Referral Diagnosis: LBP   Referring provider: Lopez Gordon MD  Visit Diagnosis:     ICD-10-CM    1. Chronic bilateral low back pain without sciatica  M54.5     G89.29    2. Pain in joint involving right ankle and foot  M25.571    3. Chronic pain of both knees  M25.561     M25.562     G89.29    4. Myofascial pain  M79.18           Assessment:     Patient is benefitting from skilled physical therapy and is making steady progress toward functional goals.  Patient is appropriate to continue with skilled physical therapy intervention, as indicated by initial plan of care.   She did have good release of fascial tensions today which should help to improve her LE pains. There has also been a lot of stress in her life which we did discuss as a possible contributor to her pain levels.        Goal Status:  Pt. will demonstrate/verbalize independence in self-management of condition in : 12 weeks  Pt. will report decreased intensity, frequency of : Pain;in 12 weeks  Pt. will be able to walk : 30 minutes;on even surfaces;with less difficulty;for household mobility;for community mobility;for exercise/recreation;in 12 weeks    Patient will decrease : TREVER score;by _ points;for improved quality of function;for improved quality of life;in 12 weeks  by ___ points: 10      Plan / Patient Education:     Continue with initial plan of care.      Subjective:     Pain Rating: 3 in lower back, 4 in the abdomen.   Her knees are R foot are horrible today. She did put a call in to the orthopedic MD.   She started doing exercises and has been getting cramps in her legs. She hasn't done them for the last week or so.   She has also been having more cramps in her legs with sleeping as well.       Objective:     MS fascial tensions.       Treatment Today   5/4/2021   TREATMENT MINUTES COMMENTS   Evaluation     Self-care/ Home  management     Manual therapy 25 Fascial release using Strain-Counterstrain of BLE (P)-MS, B pelvis (P)-MS, B knee (C)-MS     Neuromuscular Re-education 15 Recip inhib of MS fascia   Therapeutic Activity     Therapeutic Exercises 15 AA/PROM to BLE, pelvis   Gait training     Modality__________________                Total 55    Blank areas are intentional and mean the treatment did not include these items.       Bennie Hernandez  5/4/2021

## 2021-06-18 NOTE — LETTER
Letter by Mell Doss at      Author: Mell Doss Service: -- Author Type: --    Filed:  Encounter Date: 1/3/2019 Status: (Other)        [facility] PATIENT ACCESS  [address] [facility] [address]  [phone]         Anais Kelly  [address] [facility] [address]        01/03/19    Dear Anais Kelly,     At [facility], we care about your health and well-being. Your primary care provider is committed to ensuring you receive high quality care and has chosen a network of specialists to assist in providing that care. Recently Dr. Barker referred you for mental health services.     It is important to your overall health to follow through with the recommendation from your provider. Please contact your insurance carrier to determine what facility/provider is within your behavorial/mental health network and contact the facility/provider at your earliest convenience for assistance in scheduling an appointment.     If you have already scheduled this appointment, please disregard this notice.  [facility] Specialty Scheduling

## 2021-06-18 NOTE — PATIENT INSTRUCTIONS - HE
Patient Instructions by Lopez Gordon MD at 2/10/2021 10:00 AM     Author: Lopez Gordon MD Service: -- Author Type: Physician    Filed: 2/10/2021 10:46 AM Encounter Date: 2/10/2021 Status: Addendum    : Lopez Gordon MD (Physician)    Related Notes: Original Note by Lopez Gordon MD (Physician) filed at 2/10/2021 10:46 AM       Start a regular exercise routine with 20 minutes of walking aerobic type exercise daily.  Consider a [employer] video for exercise.    Consider a sleep study in the future to evaluate for sleep apnea.  Sleep apnea can be associated with neuropathy.    Try capsaicin cream on your feet in the evening to help reduce neuropathy symptoms.    You may be due for your screening colonoscopy this year.  You may be contacted by [facility] to set that up.    I would like you to have a repeat ultrasound of your abdominal aorta this summer, prior to considering a colonoscopy.  You could also consider Cologuard test for colon cancer screening.    Calcium citrate 500 mg twice a day is recommended.    We will discuss osteoporosis treatment options in May with the osteoporosis specialist here in clinic.    Proceed with yearly mammogram in April, as scheduled.    Follow-up with me in July.  Nonfasting follow-up appointment.  I will plan to have you repeat your ultrasound for your aortic aneurysm in July.    Check your [facility] portal to get your lab results from today.  Patient Education     Exercise for a Healthier Heart  You may wonder how you can improve the health of your heart. If youre thinking about exercise, youre on the right track. You dont need to become an athlete, but you do need a certain amount of brisk exercise to help strengthen your heart. If you have been diagnosed with a heart condition, your doctor may recommend exercise to help stabilize your condition. To help make exercise a habit, choose safe, fun activities.       Be sure to check with your health  care provider before starting an exercise program.    Why exercise?  Exercising regularly offers many healthy rewards. It can help you do all of the following:    Improve your blood cholesterol levels to help prevent further heart trouble    Lower your blood pressure to help prevent a stroke or heart attack    Control diabetes, or reduce your risk of getting this disease    Improve your heart and lung function    Reach and maintain a healthy weight    Make your muscles stronger and more limber so you can stay active    Prevent falls and fractures by slowing the loss of bone mass (osteoporosis)    Manage stress better  Exercise tips  Ease into your routine. Set small goals. Then build on them.  Exercise on most days. Aim for a total of 150 or more minutes of moderate to  vigorous intensity activity each week. Consider 40 minutes, 3 to 4 times a week. For best results, activity should last for 40 minutes on average. It is OK to work up to the 40 minute period over time. Examples of moderate-intensity activity is walking one mile in 15 minutes or 30 to 45 minutes of yard work.  Step up your daily activity level. Along with your exercise program, try being more active throughout the day. Walk instead of drive. Do more household tasks or yard work.  Choose one or more activities you enjoy. Walking is one of the easiest things you can do. You can also try swimming, riding a bike, or taking an exercise class.  Stop exercising and call your doctor if you:    Have chest pain or feel dizzy or lightheaded    Feel burning, tightness, pressure, or heaviness in your chest, neck, shoulders, back, or arms    Have unusual shortness of breath    Have increased joint or muscle pain    Have palpitations or an irregular heartbeat      9145-2575 [employer]. [address] [place] [address], [place] [address]. All rights reserved. This information is not intended as a substitute for professional medical care. Always follow your  healthcare professional's instructions.         Patient Education   Urinary Incontinence, Female (Adult)  Urinary incontinence means loss of control of the bladder. This problem affects many women, especially as they get older. If you have incontinence, you may be embarrassed to ask for help. But know that this problem can be treated.  Types of Incontinence  There are different types of incontinence. Two of the main types are described here. You can have more than one type.    Stress incontinence. With this type, urine leaks when pressure (stress) is put on the bladder. This may happen when you cough, sneeze, or laugh. Stress incontinence most often occurs because the pelvic floor muscles that support the bladder and urethra are weak. This can happen after pregnancy and vaginal childbirth or a hysterectomy. It can also be due to excess body weight or hormone changes.    Urge incontinence (also called overactive bladder). With this type, a sudden urge to urinate is felt often. This may happen even though there may not be much urine in the bladder. The need to urinate often during the night is common. Urge incontinence most often occurs because of bladder spasms. This may be due to bladder irritation or infection. Damage to bladder nerves or pelvic muscles, constipation, and certain medicines can also lead to urge incontinence.  Treatment of urinary incontinence depends on the cause. Further evaluation is needed to find the type you have. This will likely include an exam and certain tests. Based on the results, you and your healthcare provider can then plan treatment. Until a diagnosis is made, the home care tips below can help relieve symptoms.  Home care    Do pelvic floor muscle exercises, if they are prescribed. The pelvic floor muscles help support the bladder and urethra. Many women find that their symptoms improve when doing special exercises that strengthen these muscles. To do the exercises contract the  muscles you would use to stop your stream of urine, but do this when youre not urinating. Hold for 10 seconds, then relax. Repeat 10 to 20 times in a row, at least 3 times a day. Your provider may give you other instructions for how to do the exercises and how often.    Keep a bladder diary. This helps track how often and how much you urinate over a set period of time. Bring this diary with you to your next visit with the provider. The information can help your provider learn more about your bladder problem.    Lose weight, if advised to by your provider. Excess weight puts pressure on the bladder. Your provider can help you create a weight-loss plan thats right for you. This may include exercising more and making certain diet changes.    Don't consume foods and drinks that may irritate the bladder. These can include alcohol and caffeinated drinks.    Quit smoking. Smoking and other tobacco use can lead to chronic cough that strains the pelvic floor muscles. Smoking may also damage the bladder and urethra. Talk with your provider about treatments or methods you can use to quit smoking.    If drinking large amounts of fluid causes you to have symptoms, you may be advised to limit your fluid intake. You may also be advised to drink most of your fluids during the day and to limit fluids at night.    If youre worried about urine leakage or accidents, you may wear absorbent pads to catch urine. Change the pads often. This helps reduce discomfort. It may also reduce the risk of skin or bladder infections.  Follow-up care  Follow up with your healthcare provider, or as directed. It may take some to find the right treatment for your problem. Your treatment plan may include special therapies or medicines. Certain procedures or surgery may also be options. Be sure to discuss any questions you have with your provider.  When to seek medical advice  Call the healthcare provider right away if any of these occur:    Fever of  100.4 F (38 C) or higher, or as directed by your provider    Bladder pain or fullness    Abdominal swelling    Nausea or vomiting    Back pain    Weakness, dizziness or fainting  Date Last Reviewed: 10/1/2017    6851-4826 [employer]. [address] [place] [address], [place] [address]. All rights reserved. This information is not intended as a substitute for professional medical care. Always follow your healthcare professional's instructions.     Patient Education   Depression and Suicide in Older Adults  Nearly 2 million older Americans have some type of depression. Sadly, some of them even take their own lives. Yet depression among older adults is often ignored. Learn the warning signs. You may help spare a loved one needless pain. You may also save a life.       What Is Depression?  Depression is a mood disorder that affects the way you think and feel. The most common symptom is a feeling of deep sadness. People who are depressed also may seem tired and listless. And nothing seems to give them pleasure. Its normal to grieve or be sad sometimes. But sadness lessens or passes with time. Depression rarely goes away or improves on its own. Other symptoms of depression are:    Sleeping more or less than normal    Eating more or less than normal    Having headaches, stomachaches, or other pains that dont go away    Feeling nervous, empty, or worthless    Crying a great deal    Thinking or talking about suicide or death    Feeling confused or forgetful  What Causes It?  The causes of depression arent fully known. Certain chemicals in the brain play a role. Depression does run in families. And life stresses can also trigger depression in some people. That may be the case with older adults. They often face great burdens, such as the death of friends or a spouse. They may have failing health. And they are more likely to be alone, lonely, or poor.  How You Can Help  Often, depressed people may not want to ask for help.  When they do, they may be ignored. Or, they may receive the wrong treatment. You can help by showing parents and older friends love and support. If they seem depressed, help them find the right treatment. Talk to your doctor. Or contact a local mental health center, social service agency, or hospital. With modern treatment, no one has to suffer from depression.  Resources:    [place] of Mental Health  602.756.3495  www.nimh.nih.gov    National [place] on Mental Illness  699.765.7662  www.guille.org    M[facility]  405.175.8416  www.nmha.org    National Suicide Hotline  462.376.2177 (800-SUICIDE)      8857-7830 [employer]. [address] [place] [address], [place] [address]. All rights reserved. This information is not intended as a substitute for professional medical care. Always follow your healthcare professional's instructions.           Advance Directive  Patients advance directive was discussed and I am comfortable with the patients wishes.  Patient Education   Personalized Prevention Plan  You are due for the preventive services outlined below.  Your care team is available to assist you in scheduling these services.  If you have already completed any of these items, please share that information with your care team to update in your medical record.  Health Maintenance   Topic Date Due   ? HEPATITIS C SCREENING  1950   ? COVID-19 Vaccine (2 of 2 - Moderna series) 03/09/2021   ? MAMMOGRAM  01/14/2022   ? MEDICARE ANNUAL WELLNESS VISIT  02/10/2022   ? FALL RISK ASSESSMENT  02/10/2022   ? COLORECTAL CANCER SCREENING  10/31/2024   ? LIPID  09/02/2025   ? ADVANCE CARE PLANNING  02/10/2026   ? TD 18+ HE  05/18/2030   ? DEXA SCAN  02/24/2035   ? Pneumococcal Vaccine: 65+ Years  Completed   ? INFLUENZA VACCINE RULE BASED  Completed   ? ZOSTER VACCINES  Completed   ? Pneumococcal Vaccine: Pediatrics (0 to 5 Years) and At-Risk Patients (6 to 64 Years)  Aged Out   ? HEPATITIS B VACCINES   Aged Out

## 2021-06-19 NOTE — PROGRESS NOTES
ASSESSMENT and PLAN:  1. Irritable bowel syndrome with diarrhea  I discussed a low fat maps diet with her.  I also recommended I [facility] irritable bowel program.    2. Benign essential hypertension  This is been well controlled on lisinopril.    3. Primary insomnia  We did not specifically address this today but it was updated on her problem list.    4. Small fiber neuropathy (H); diagnosed by neurology  She saw a neurologist and had blood work for this.  She has numbness and tingling in her feet and is now developing into her hands.  She is going to be starting gabapentin for her abdominal pain.  This may help her neuropathy here.  We will follow-up at her next visit.  For the pain in her left arm, if that continues, we would look at an EMG.    5. Abdominal pain, right lower quadrant  This is a newer pain.  This could be pain from adhesions.  It could be referred back pain.  We will get blood work to look for possible infectious cause.  I advised her on a bland diet and if symptoms worsen, then she would need to be evaluated for possible obstruction.  - HM1(CBC and Differential)  - Comprehensive Metabolic Panel  - Erythrocyte Sedimentation Rate  - C-Reactive Protein  - HM1 (CBC with Diff)    7. Numbness and tingling in left arm  This is a newer issue for her as well.  I would like to get lab work.  If it does not improve or worsens, we could look at an EMG to further assist in diagnosis.  - Vitamin B12  - Magnesium  - Thyroid Cascade  - Methylmalonic Acid (MMA), Quantitative    8. Abdominal pain, chronic, bilateral lower quadrant  She has had an extensive evaluation including a GI with procedures and CT.  Nothing has alleviated her pain.  This could be neuropathic pain.  We will give her a trial of gabapentin.  We will start off by getting her up to 300 mg at night.  We certainly have room to increase this.  - gabapentin (NEURONTIN) 100 MG capsule; Take one PO at bedtime X1 week, then 2 PO at bedtime x 1  week, then 3 PO at bedtime  Dispense: 90 capsule; Refill: 0      There are no discontinued medications.    No Follow-up on file.    Patient Instructions   For the newer right-sided pain, we will get blood work today.  Try the BRAT diet for a few days.  If your symptoms are getting worse, you develop nausea or vomiting, fevers or chills, or you become constipated, then you should be reevaluated.  I recommend the emergency room or ER if your symptoms are severe.    For the chronic longer standing pain, I recommend trying gabapentin.  Start off by taking 1 pill at bedtime for 1 week, then 2 pills at bedtime for 1 week then 3 pills at bedtime thereafter.  Please see me for follow-up in about 3-4 weeks.    For your irritable bowel symptoms, I recommend you look at either a low FODMAPS diet or contact our [facility] for their irritable bowel program.  This has been very beneficial for several patients.    Today's labs will be available on [facility].  If you aren't signed up, then we'll mail them out.  If anything needs more immediate follow up, we'll also call.    Welcome to our clinic!  It was very nice meeting you today!  Take care!        CHIEF COMPLAINT:  Chief Complaint   Patient presents with     Abdominal Pain     x 3.5 years, multiple surgeries       HISTORY OF PRESENT ILLNESS:  Anais Kelly is a 68 y.o. female  presenting to the clinic today to establish care.    She brought in a note from Dr. Sosa dated July 6, 2018.  She presented with numbness and swelling of the left forearm to elbow.    It is noted that her past medical history is significant for irritable bowel, paresthesias, insomnia, hypertension, bilateral neuropathy in both feet.  Her mammogram was done in 2017, bone density in December 2017, colonoscopy October 2014 showing diverticulitis with the recommendation to recheck in 10 years.    Today, she wanted to discuss abdominal pain that she has had for at least 3-1/2 years.  She has a  "history of abdominal surgeries.  She had laparoscopic adhesions in 1984 as well as laparotomy for adhesions in 1984, she had a BARBARA/BSO noncancerous with bladder lift in 1984, abdominoplasty in January 2015.  Following the abdominoplasty, she has had pain every day.  It is in both lower quadrants.  She cannot really describe the pain other than to say it just hurts.  It is separate from her digestive issues.  She went to the pain clinic and tried physical therapy but she said that just made it worse.    She has new pain.  That is in her right lower quadrant.  It started on Sunday.  Is gotten stronger since that time.  She denies any urinary symptoms.  She has had 3 bowel movements this morning.  She does not have fevers chills or nausea.  She has had a total abdominal hysterectomy as well as an appendectomy.    She has been diagnosed with irritable bowel syndrome.  She saw GI for this.  She has had an upper endoscopy flexible sigmoidoscopy and CT scan.  She is eliminated lactose from her diet.  She takes Imodium as well as Bentyl.  Nothing is provided her with lasting relief.    She also wanted to discuss numbness and swelling of her left forearm to elbow that she noticed on July 5 of this year.  It continues to feel numb.  This is new since she saw her neurologist and was diagnosed with small fiber neuropathy.  She has never had an EMG.    REVIEW OF SYSTEMS:    All other systems are negative.    PFSH:  Family History   Problem Relation Age of Onset     Cancer Father 72     Bladder Cancer     Multiple sclerosis Mother      No Medical Problems Brother        TOBACCO USE:  History   Smoking Status     Never Smoker   Smokeless Tobacco     Never Used       VITALS:  Vitals:    07/17/18 0919   BP: 112/76   Pulse: 70   Resp: 12   Temp: 98.5  F (36.9  C)   Weight: 152 lb 9.6 oz (69.2 kg)   Height: 5' 6.75\" (1.695 m)     Wt Readings from Last 3 Encounters:   07/17/18 152 lb 9.6 oz (69.2 kg)   07/31/17 148 lb (67.1 kg) "         PHYSICAL EXAM:  Constitutional:  Reveals an alert, pleasant adult female.   Vitals:  Noted.   Lungs: Clear to auscultation bilaterally, respirations unlabored.   Heart: Regular rate and rhythm, S1 and S2 normal, no murmur, rub, or gallop,   Abdomen: Soft, non-tender, bowel sounds active all four quadrants, no masses, no organomegaly   Extremities: Extremities normal, atraumatic, no cyanosis or edema   Skin: Skin color, texture, turgor normal, no rashes or lesions     MEDICATIONS:  Current Outpatient Prescriptions   Medication Sig Dispense Refill     cholecalciferol, vitamin D3, (VITAMIN D3) 5,000 unit Tab Take 1 tablet by mouth daily.       dicyclomine (BENTYL) 10 MG capsule Take 10 mg by mouth 2 (two) times a day.       fluticasone (FLONASE) 50 mcg/actuation nasal spray into each nostril.       lisinopril (PRINIVIL,ZESTRIL) 10 MG tablet Take 10 mg by mouth daily.       LORazepam (ATIVAN) 1 MG tablet Take 1-2 tablets (1-2 mg total) by mouth every 6 (six) hours as needed for anxiety. 20 tablet 0     olopatadine 0.2 % Drop Apply to eye.       omeprazole (PRILOSEC) 20 MG capsule Take 20 mg by mouth daily before breakfast.       PREMARIN 0.9 mg tablet        gabapentin (NEURONTIN) 100 MG capsule Take one PO at bedtime X1 week, then 2 PO at bedtime x 1 week, then 3 PO at bedtime 90 capsule 0     No current facility-administered medications for this visit.

## 2021-06-19 NOTE — LETTER
Letter by Lissy Ortega PharmD at      Author: Lissy Ortega PharmD Service: -- Author Type: --    Filed:  Encounter Date: 2019 Status: (Other)             2019      Anais Kelly  [address]  [facility] [address]            Dear Anais Kelly     Thank you for talking with me on 19 about your health and medications. [facility] MTM (Medication Therapy Management) program helps you understand your medications and use them safely.    Along with this letter are an action plan (Medication Action Plan) and a medication list (Personal Medication List). The action plan has steps you should take to help you get the best results from your medications. The medication list will help you keep track of your medications and how to use them the right way.       Have your action plan and medication list with you when you talk with your doctors, pharmacists, and other health care providers.    Ask your doctors, pharmacists, and other healthcare providers to update them at every visit.     Take your medication list with you if you go to the hospital or emergency room.     Give a copy of the action plan and medication list to your family or caregivers.     If you want to talk about this letter or any of the papers with it, please call [phone]. We look forward to working with you, your doctors, and other healthcare providers to help you stay healthy.    Sincerely,     Lissy Ortega, PharmD, [facility]    _  Medication Action Plan For Anais Kelly, : 1950     This action plan will help you get the best results from your medications if you:     1. Read What we talked about.   2. Take the steps listed in the What I need to do boxes.   3. Fill in What I did and when I did it.   4. Fill in My follow-up plan and Questions I want to ask.     Have this action plan with you when you talk with your doctors, pharmacists, and other healthcare providers in your care  team. Share this with your family or caregivers too.    Date Prepared: 4/9/2019      What we talked about:  High calcium level     What I need to do:  Stop calcium supplement. Make a lab only appointment next month to have your level rechecked. Discuss getting a repeat bone density scan with new doctor.  What I did and when I did it:          What we talked about:  Irritable bowel syndrome     What I need to do:  You can use dicyclomine up to four times daily which may help with gastrointestinal symptoms such as abdominal pain, cramping, bloating, etc. Follow up with GI doctor.  What I did and when I did it:          What we talked about:    Other topics to discuss with new doctor:   1) Possible trial off lisinopril for blood pressure.   2) Alternative sleeping aid instead of lorazepam.    What I need to do:  1) You may no longer need lisinopril for high blood pressure, so ask your new doctor about a trial off of it.   2) To avoid long term side effects, I would recommend trying a different sleeping aid in place of lorazepam, such as trazodone.  What I did and when I did it:         My follow-up plan (add notes about next steps):            Questions I want to ask (include topics about medications or therapy):          If you have any questions about your action plan, call Lissy Ortega at [phone], Monday-Friday 8:00-4:30pm  _  This medication list was made for you after we talked. We also used information from your doctors chart.      Use blank rows to add new medications. Then fill in the dates you started using them.     Cross out medications when you no longer use them. Then write the date and why you stopped using them.     Ask your doctors, pharmacists, and other healthcare providers to update this list at every visit.  Keep this list up-to-date with:  ? prescription medications  ? over the counter drugs  ? herbals  ? vitamins  ? minerals          If you go to the hospital or emergency room, take this  list with you. Share this with your family or caregivers too.    Date Prepared: 4/9/2019      Allergies or side effects: amoxicillin; celebrex [celecoxib]; vicodin [hydrocodone-acetaminophen]; codeine; diphenhydramine; zolpidem      Medication: cholecalciferol, vitamin D3, (VITAMIN D3) 5,000 unit Tab      How I use it: Take 1 tablet by mouth daily.      Why I use it: Vitamin D supplement    Prescriber: Shani Barker MD      Date I started using it:     Date I stopped using it:       Why I stopped using it:          Medication: cyanocobalamin injection 1,000 mcg      How I use it: Inject 1,000 mcg intramuscular every 1 month.       Why I use it: Vitamin B12    Prescriber: Shani Barker MD      Date I started using it:     Date I stopped using it:       Why I stopped using it:          Medication: dicyclomine (BENTYL) 10 MG capsule      How I use it: Take 10 mg by mouth 4 (four) times a day as needed.      Why I use it: Irritable bowel syndrome     Prescriber: Shani Barker MD      Date I started using it:     Date I stopped using it:       Why I stopped using it:          Medication: erythromycin ophthalmic ointment 0.5%      How I use it: Administer 1 application to both eyes at bedtime.      Why I use it: Dry eyes    Prescriber: Shani Barker MD      Date I started using it:     Date I stopped using it:       Why I stopped using it:          Medication: estrogens, conjugated, (PREMARIN) 0.625 MG tablet      How I use it: Take 1 tablet (0.625 mg total) by mouth daily.      Why I use it: Hormone replacement     Prescriber: Shani Barker MD      Date I started using it:     Date I stopped using it:       Why I stopped using it:          Medication: lisinopril (PRINIVIL,ZESTRIL) 10 MG tablet      How I use it: Take 10 mg by mouth daily.      Why I use it: High blood pressure     Prescriber: Shani Barker MD      Date I started using it:     Date I stopped using it:       Why I stopped using it:           Medication: LORazepam (ATIVAN) 1 MG tablet      How I use it: Take 1-2 tablets (1-2 mg total) by mouth at bedtime for sleep      Why I use it: Insomnia     Prescriber: Shani Barker MD      Date I started using it:     Date I stopped using it:       Why I stopped using it:          Medication: multivitamin therapeutic tablet      How I use it: Take 1 tablet by mouth daily.      Why I use it: Vitamin    Prescriber: Shani Barker MD      Date I started using it:     Date I stopped using it:       Why I stopped using it:          Medication: omeprazole (PRILOSEC) 20 MG capsule      How I use it: Take 20 mg by mouth daily before breakfast.      Why I use it: Heartburn    Prescriber: Shani Barker MD      Date I started using it:     Date I stopped using it:       Why I stopped using it:          Medication: ranitidine (ZANTAC) 150 MG tablet      How I use it: Take 150 mg by mouth 2 (two) times a day as needed.      Why I use it: Heartburn/indigestion     Prescriber: Shani Barker MD      Date I started using it:     Date I stopped using it:       Why I stopped using it:          Medication: RESTASIS 0.05 % ophthalmic emulsion      How I use it: TAKE 1 DROP(S) IN BOTH EYES 2 TIMES A DAY      Why I use it: Dry eyes    Prescriber: Shani Barker MD      Date I started using it:     Date I stopped using it:       Why I stopped using it:          Medication:       How I use it:       Why I use it:     Prescriber:       Date I started using it:     Date I stopped using it:       Why I stopped using it:          Medication:       How I use it:       Why I use it:     Prescriber:       Date I started using it:     Date I stopped using it:       Why I stopped using it:          Medication:       How I use it:       Why I use it:     Prescriber:       Date I started using it:     Date I stopped using it:       Why I stopped using it:          Other Information:      If you have any questions about your medication  list, call [phone]    According to the Paperwork Reduction Act of 1995, no persons are required to respond to a collection of information unless it displays a valid OMB control number. The valid OMB number for this information collection is [id number]. The time required to complete this information collection is estimated to average 37.76 minutes per response, including the time to review instructions, searching existing data resources, gather the data needed, and complete and review the information collection. If you have any comments concerning the accuracy of the time estimate(s) or suggestions for improving this form, please write to: CMS, Attn: [employer] Clearance Officer, [address] [place] [phone].

## 2021-06-20 NOTE — LETTER
Letter by Lopez Gordon MD at      Author: Lopez Gordon MD Service: -- Author Type: --    Filed:  Encounter Date: 2/27/2020 Status: (Other)         [place]ais [place]  [address] [facility] [facility] [address]             February 27, 2020         Dear Ms. Kelly,    Below are the results from your recent visit:    Resulted Orders   DXA Bone Density Scan    Narrative    2/24/2020      RE: Anais Kelly  YOB: 1950        Dear Lopez Gordon,    Patient Profile:  70 y.o. female, postmenopausal, is here for the first bone density test.   History of fractures - None. Family history of osteoporosis - None.    Family history of hip fracture: None. Smoking history - No. Osteoporosis   treatment past -  Yes;  HRT. Osteoporosis treatment current - Yes;  HRT.    Chronic medical problems - None. High risk medications -  None.      Assessment:    1. The spine bone density L1-L4 with T-score -0.6.  2. Femoral bone densities show left total hip T- score -2.1 and right   total hip T-score -2.6.  3. Trabecular bone score indicates poor trabecular bone architecture.      70 y.o. female with OSTEOPOROSIS and HIGH fracture risk.    Previous scan was done on a different machine and is not directly   comparable with the current study.    Recommendations:  Appropriate evaluation and treatment recommended with follow up bone   density scan after 1 year of active treatment.      Bone densitometry was performed on your patient using our [employer]   densitometer. The results are summarized and a copy of the actual scans   are included for your review. In conformity with the International Society   of Clinical Densitometry's most recent position statement for DXA   interpretation (2015), the diagnosis will be made on the lowest measured   T-score of the lumbar spine, femoral neck, total proximal femur or 33%   radius. Note the change in terminology for diagnostic classification from   OSTEOPENIA to LOW BONE MASS. All  trending for sequential exams will be   done using multiple vertebrae or the total proximal femur. Fracture risk   is based on the WHO Fracture Risk Assessment Tool (FRAX). If additional   information is needed or if you would like to discuss the results, please   do not hesitate to call me.       Thank you for referring this patient to [facility].   We are happy to be of service in support of you and your practice. If you   have any questions or suggestions to improve our service, please call me   at [phone].     Sincerely,     Emilee Lemus M.D. [email or web address].  Osteoporosis Services, [facility]         Bone density test does show borderline osteoporosis in your hips.  You may consider starting osteoporosis medication, such as Fosamax.  Please make an appointment to see me in clinic to discuss your treatment options.    Continue calcium and vitamin D supplement daily and regular walking exercise.    Please call with questions or contact us using [employer].    Sincerely,        Electronically signed by Lopez Gordon MD

## 2021-06-20 NOTE — LETTER
Letter by Lpoez Gordon MD at      Author: Lopez Gordon MD Service: -- Author Type: --    Filed:  Encounter Date: 3/2/2020 Status: (Other)         Anais Kelly  [address]  [facility] [address]             March 2, 2020         Dear Ms. Leticia,    Below are the results from your recent visit:    Resulted Orders   CT Chest Over Read    Narrative    EXAM: OVERREAD: DETAILED [facility] EXTRACARDIAC OVERREAD OF CARDIAC CT   LOCATION: [facility]  DATE/TIME: 2/28/2020 9:55 AM    INDICATION: CAD risk, intermediate, asymptomatic  TECHNIQUE: Dose reduction techniques were used.  CONTRAST: None.  COMPARISON: Chest x-ray 07/31/2017, abdomen CT 04/06/2018    FINDINGS:    LIMITED CHEST: Unremarkable.  LIMITED MEDIASTINUM: No adenopathy.  LIMITED UPPER ABDOMEN: Calcification of the aorta and origin of the celiac artery.      Impression    1.  No significant incidental extracardiac findings.  2.  Please refer to cardiologist's dictation for the cardiac CT report.         Lung images were clear on chest CT imaging.    Please call with questions or contact us using [employer].    Sincerely,        Electronically signed by Lopez Gordon MD

## 2021-06-20 NOTE — PROGRESS NOTES
ASSESSMENT and PLAN:  1. Lower abdominal pain  Has been present for several years.  It started following a panniculectomy and what sounds like a ventral hernia repair.  She is wondering if adhesions could be causing some of her symptoms.  I think this is certainly a possibility given her history of multiple abdominal surgeries.  However, if this is the cause, I am not sure how much benefit she would get versus risk of having the surgery done.  I have encouraged her to discuss this with the general surgeon.  Her  has encouraged her to go to the [facility] for an evaluation.  - Ambulatory referral to General Surgery    2. Hx of abdominal surgery  With her history of multiple abdominal surgeries in her current pain that started following his surgery, I think it is possible that adhesions could be causing some of her symptoms.  She does not have any evidence for obstruction based on scans.  We will have her discuss her symptoms with the general surgeon.  - Ambulatory referral to General Surgery      There are no discontinued medications.  Administrations This Visit     cyanocobalamin injection 1,000 mcg     Admin Date Action Dose Route Administered By             08/22/2018 Given 1000 mcg Intramuscular Ariadne Perkins MA                        No Follow-up on file.    Patient Instructions   Decrease your neurontin by one tablet each week.  After one week of one tablet, then stop.    Continue once a month B12 shots at least until we have a lab recheck in Oct.    Please see Dr. Lind to discuss your abdominal pain and the possibility of adhesions being the cause; if so, risk/benefit potential of surgical intervention.    Take care!      CHIEF COMPLAINT:  Chief Complaint   Patient presents with     Abdominal Pain     having trouble with the medication that was perscribed, wondering about B12 since she got a few injections and wondering where it is at,      Numbness     left arm. since 7/3/18 constant, swollen from  wrist to elbow       HISTORY OF PRESENT ILLNESS:  Anais Kelly is a 68 y.o. female  presenting to the clinic today for follow up of B12 deficiency.  I saw her on 7/17/18 w/ sx of tingling and numbness.  Previously, she had been diagnosed by a neurosurgeon with small fiber neuropathy.  When I saw her, her MMA level came back elevated and B12 shots were ordered for her.  The plan was to have a B12 shot twice in one month and then every month after that.  We again reviewed that today.    Additionally, she has been having chronic abdominal pain following an panniculectomy with what sounds like repair of a ventral hernia.  She feels it is the worst mistake she ever made.  Prior to that, she had some abdominal surgeries including lysis of adhesions.  She has had a CT scan since the symptoms started and no cause was found.  Her symptoms do not change with food or bowel movements.  She feels better if she hunches down her slouches but sitting upright she does not feel comfortable.  She will wince some time due to the pain.  She tells me that she followed up with her plastic surgeon who told her that a blue BRCA2 fluid is lost during abdominal surgery and cannot be replaced and that that might be the cause for her symptoms.  I am not entirely clear on that process.  She is wondering if adhesions could be causing some of her symptoms given that nothing else was found.  There was some thought that this could be a nerve type pain.  She had no improvement in her symptoms with gabapentin.  In fact, she felt fatigued and cloudy on gabapentin and felt that she was sleeping too much.  She is currently up to 300 mg at bedtime.    REVIEW OF SYSTEMS:   Gen: weight gain since starting metformin   All other systems are negative.    PFSH:  Family History   Problem Relation Age of Onset     Cancer Father 72     Bladder Cancer     Multiple sclerosis Mother      No Medical Problems Brother        TOBACCO USE:  History   Smoking Status      Never Smoker   Smokeless Tobacco     Never Used       VITALS:  Vitals:    08/22/18 1522   BP: 122/78   Pulse: 80   Weight: 156 lb 6.4 oz (70.9 kg)     Wt Readings from Last 3 Encounters:   08/22/18 156 lb 6.4 oz (70.9 kg)   07/17/18 152 lb 9.6 oz (69.2 kg)   07/31/17 148 lb (67.1 kg)     PHYSICAL EXAM:  Constitutional:  Reveals an alert, pleasant adult female.   Vitals:  Noted.     MEDICATIONS:  Current Outpatient Prescriptions   Medication Sig Dispense Refill     cholecalciferol, vitamin D3, (VITAMIN D3) 5,000 unit Tab Take 1 tablet by mouth daily.       dicyclomine (BENTYL) 10 MG capsule Take 10 mg by mouth 2 (two) times a day.       fluticasone (FLONASE) 50 mcg/actuation nasal spray into each nostril.       gabapentin (NEURONTIN) 100 MG capsule Take one PO at bedtime X1 week, then 2 PO at bedtime x 1 week, then 3 PO at bedtime 90 capsule 0     lisinopril (PRINIVIL,ZESTRIL) 10 MG tablet Take 10 mg by mouth daily.       LORazepam (ATIVAN) 1 MG tablet Take 1-2 tablets (1-2 mg total) by mouth every 6 (six) hours as needed for anxiety. 20 tablet 0     olopatadine 0.2 % Drop Apply to eye.       omeprazole (PRILOSEC) 20 MG capsule Take 20 mg by mouth daily before breakfast.       PREMARIN 0.9 mg tablet        Current Facility-Administered Medications   Medication Dose Route Frequency Provider Last Rate Last Dose     cyanocobalamin injection 1,000 mcg  1,000 mcg Intramuscular Q30 Days Shani Barker MD   1,000 mcg at 08/22/18 9967

## 2021-06-20 NOTE — LETTER
Letter by Lissy Ortega PharmD at      Author: Lissy Ortega PharmD Service: -- Author Type: --    Filed:  Encounter Date: 3/4/2020 Status: (Other)             3/4/2020      Anais Kelly  [address]  [facility] [address]            Dear Anais Kelly     Thank you for talking with me on 20 about your health and medications. [facility] MTM (Medication Therapy Management) program helps you understand your medications and use them safely.    Along with this letter are an action plan (Medication Action Plan) and a medication list (Personal Medication List). The action plan has steps you should take to help you get the best results from your medications. The medication list will help you keep track of your medications and how to use them the right way.       Have your action plan and medication list with you when you talk with your doctors, pharmacists, and other health care providers.    Ask your doctors, pharmacists, and other healthcare providers to update them at every visit.     Take your medication list with you if you go to the hospital or emergency room.     Give a copy of the action plan and medication list to your family or caregivers.     If you want to talk about this letter or any of the papers with it, please call [phone]. We look forward to working with you, your doctors, and other healthcare providers to help you stay healthy.    Sincerely,   Lissy Ortega, PharmD    _  Medication Action Plan For Anais Kelly, : 1950     This action plan will help you get the best results from your medications if you:     1. Read What we talked about.   2. Take the steps listed in the What I need to do boxes.   3. Fill in What I did and when I did it.   4. Fill in My follow-up plan and Questions I want to ask.     Have this action plan with you when you talk with your doctors, pharmacists, and other healthcare providers in your care team. Share this with your family or caregivers too.     Date Prepared: 3/4/2020      What we talked about:  Due to your elevated CT calcium score, recommend starting cholesterol medication.      What I need to do:  Discuss starting a statin with Dr. Gordon. Rosuvastatin may be a good first choice as it has less risk of diarrhea.    What I did and when I did it:          What we talked about:  Consider a trial of lower dose of Premarin in the future.      What I need to do:  We could try a lower dose of Premarin, such as 0.45 mg, to see if this is still effective.    What I did and when I did it:          What we talked about:  Treatment for osteoporosis based on DEXA scan results.      What I need to do:  Further discuss treatment options with Dr. Gordon and osteoporosis specialist.    What I did and when I did it:         My follow-up plan (add notes about next steps):            Questions I want to ask (include topics about medications or therapy):          If you have any questions about your action plan, call Lissy Ortega at [phone], Monday-Friday 8:00-4:30pm  _  This medication list was made for you after we talked. We also used information from your doctors chart.      Use blank rows to add new medications. Then fill in the dates you started using them.     Cross out medications when you no longer use them. Then write the date and why you stopped using them.     Ask your doctors, pharmacists, and other healthcare providers to update this list at every visit.  Keep this list up-to-date with:  ? prescription medications  ? over the counter drugs  ? herbals  ? vitamins  ? minerals          If you go to the hospital or emergency room, take this list with you. Share this with your family or caregivers too.    Date Prepared: 3/4/2020      Allergies or side effects: acetaminophen; amoxicillin; celebrex [celecoxib]; onion; vicodin [hydrocodone-acetaminophen]; zolpidem tartrate; codeine; diphenhydramine; zolpidem      Medication: cholecalciferol, vitamin D3,  (VITAMIN D3) 5,000 unit Tab      How I use it: Take 5,000 Units by mouth daily.       Why I use it: Vitamin supplement; bone health    Prescriber: Lopez Gordon MD      Date I started using it:     Date I stopped using it:       Why I stopped using it:          Medication: cyanocobalamin injection 1,000 mcg      How I use it: Inject 1,000 mcg into the muscle once monthly.      Why I use it: Vitamin B12 Deficiency    Prescriber: Lopez Gordon MD      Date I started using it:     Date I stopped using it:       Why I stopped using it:          Medication: dicyclomine (BENTYL) 10 MG capsule      How I use it: Take 10 mg by mouth 2 (two) times a day.       Why I use it: IBS    Prescriber: Lopez Gordon MD      Date I started using it:     Date I stopped using it:       Why I stopped using it:          Medication: erythromycin ophthalmic ointment      How I use it: Administer 1 application to both eyes at bedtime.      Why I use it: Dry eyes    Prescriber: Lopez Gordon MD      Date I started using it:     Date I stopped using it:       Why I stopped using it:          Medication: famotidine (PEPCID) 20 MG tablet      How I use it: Take 20 mg by mouth daily as needed.      Why I use it: GERD/heartburn    Prescriber: Lopez Gordon MD      Date I started using it:     Date I stopped using it:       Why I stopped using it:          Medication: fluticasone propionate (FLONASE ALLERGY RELIEF) 50 mcg/actuation nasal spray      How I use it: 2 sprays into each nostril daily as needed.      Why I use it: Sinus congestion    Prescriber: Lopez Gordon MD      Date I started using it:     Date I stopped using it:       Why I stopped using it:          Medication: loperamide (IMODIUM) 2 mg capsule      How I use it: Take 2 tablets (4 mg) by mouth after 1st loose stool and 1 tablet (2 mg) after each next bowel movement. Do not exceed 16 mg in 24 hours.      Why I use it: Diarrhea    Prescriber: Lopez Gordon MD      Date I  started using it:     Date I stopped using it:       Why I stopped using it:          Medication: LORazepam (ATIVAN) 0.5 MG tablet      How I use it: Take 1/2 tablet by mouth at night, only if needed for significant insomnia.      Why I use it: insomnia    Prescriber: Lopez Gordon MD      Date I started using it:     Date I stopped using it:       Why I stopped using it:          Medication: multivitamin therapeutic tablet      How I use it: Take 1 tablet by mouth daily.      Why I use it: General health    Prescriber: Lopez Grodon MD      Date I started using it:     Date I stopped using it:       Why I stopped using it:          Medication: omeprazole (PRILOSEC) 20 MG capsule      How I use it: Take 20 mg by mouth daily before breakfast.      Why I use it: GERD/heartburn    Prescriber: Lopez Gordon MD      Date I started using it:     Date I stopped using it:       Why I stopped using it:          Medication: PREMARIN 0.625 mg tablet      How I use it: TAKE 1 TABLET BY MOUTH ONCE DAILY      Why I use it: Hormone replacement therapy, postmenopausal    Prescriber: Lopez Gordon MD      Date I started using it:     Date I stopped using it:       Why I stopped using it:          Medication: RESTASIS 0.05 % ophthalmic emulsion      How I use it: Administer 1 drop to both eyes daily.      Why I use it: Dry eyes    Prescriber: Lopez Gordon MD      Date I started using it:     Date I stopped using it:       Why I stopped using it:          Medication: traZODone (DESYREL) 50 MG tablet      How I use it: Take 1 tablet or 2 tablets by mouth 1 hour before bedtime.      Why I use it: Insomnia    Prescriber: Lopez Gordon MD      Date I started using it:     Date I stopped using it:       Why I stopped using it:          Medication:       How I use it:       Why I use it:     Prescriber:       Date I started using it:     Date I stopped using it:       Why I stopped using it:          Medication:       How I use it:        Why I use it:     Prescriber:       Date I started using it:     Date I stopped using it:       Why I stopped using it:          Medication:       How I use it:       Why I use it:     Prescriber:       Date I started using it:     Date I stopped using it:       Why I stopped using it:          Other Information:      If you have any questions about your medication list, call [phone]    According to the Paperwork Reduction Act of 1995, no persons are required to respond to a collection of information unless it displays a valid OMB control number. The valid OMB number for this information collection is [id number]. The time required to complete this information collection is estimated to average 37.76 minutes per response, including the time to review instructions, searching existing data resources, gather the data needed, and complete and review the information collection. If you have any comments concerning the accuracy of the time estimate(s) or suggestions for improving this form, please write to: CMS, Attn: [employer] Reports Clearance Officer, [address] [place] [id number].

## 2021-06-20 NOTE — PROGRESS NOTES
[facility] Daily Progress     Patient Name: Anais Kelly  Date: 10/10/2018  Visit #: 3  Referral Diagnosis: Abdominal pain, epigastric  Referring provider: Nimesh Lind DO  Visit Diagnosis:     ICD-10-CM    1. Generalized abdominal pain R10.84    2. Myofascial pain M79.18          Assessment:     Patient is benefitting from skilled physical therapy and is making steady progress toward functional goals.  Patient is appropriate to continue with skilled physical therapy intervention, as indicated by initial plan of care.  She did have an improvement in her abdominal tightness with treatment. There was good release of fascial tensions treated today. She did have an irritation of her knees with some of the HEP and was told to con't as tolerated with them.     Goal Status:  Pt. will demonstrate/verbalize independence in self-management of condition in : 12 weeks  Pt. will report decreased intensity, frequency of : Pain;in 6 weeks;Comment  Comment:: decrease pain from 0-8/10 to 0-4/10 with ADLs.   Pt. will be able to walk : 20 minutes;on even surfaces;with less pain;with less difficulty;for household mobility;for community mobility;for exercise/recreation;in 12 weeks  Patient will sit: 10 minutes;for driving;for watching TV;with less difficultty;with no pain;in 12 weeks;Comment  Comment: without having to slouch in her chair  No Data Recorded    Plan / Patient Education:      initiate SCS/MT and progress ex as tolerated.   Start her on a basic HEP for general exercise  Continue abdominal and core strengthening    Subjective:     Pain Ratin  She has been doing exercises at home and has found they are hard to do due to her knees.   She does find that the pain is worse when she is stressed. She also notices that she also bloats.   The abdomen is still about the same. She did have a massage and they spent a lot of time across her abdomen and that felt good.   There is pain in the abdomen today and is  thinking maybe she ate something she was not happy with her stomach.     Objective:   Barriers to Learning or Achieving Goals: Chronicity of the problem.    Neural, visc fascial tensions.     Treatment Today     TREATMENT MINUTES COMMENTS   Evaluation     Self-care/ Home management     Manual therapy 30 Fascial release using Strain-Counterstrain of B abdominal pre-gang-N, B lower thor/lumbar post-gang-N, B lower abd-V (posterior)   Neuromuscular Re-education 15 Recip inhib of visc, neural fascia   Therapeutic Activity     Therapeutic Exercises 25 Verbally review HEP - instead of figure 4 doing hip below 90 deg for PIR stretch   Gait training     Modality__________________                Total 70    Blank areas are intentional and mean the treatment did not include these items.       Bennie Hernandez, PT  10/10/2018

## 2021-06-20 NOTE — PROGRESS NOTES
HPI:  Anais Kelly is a 68 y.o. female who was referred to me by Shani Barker MD for right and left lower quadrant abdominal pain.  She complains of a sharp to achy discomfort over the left and lateral abdominal wall regions for the past several years.  She states that this began immediately after her abdominoplasty and pancolectomy she had in 2015.  Since then she has had this chronic pain on both left and right lower quadrants which is worse in the morning.  Additionally, she is states that she has point tenderness in the left lower mid abdominal region from time to time.  She denies any fevers, chills, nausea or vomiting.  She did see her plastic surgeon several years ago who said that he had nothing left to contribute with regards to the pain.    Allergies:Amoxicillin; Celebrex [celecoxib]; Vicodin [hydrocodone-acetaminophen]; Codeine; and Diphenhydramine    No past medical history on file.    Past Surgical History:   Procedure Laterality Date     HYSTERECTOMY  1984     OOPHORECTOMY Bilateral 1984       CURRENT MEDS:  Current Outpatient Prescriptions   Medication Sig Dispense Refill     cholecalciferol, vitamin D3, (VITAMIN D3) 5,000 unit Tab Take 1 tablet by mouth daily.       dicyclomine (BENTYL) 10 MG capsule Take 10 mg by mouth 2 (two) times a day.       fluticasone (FLONASE) 50 mcg/actuation nasal spray into each nostril.       gabapentin (NEURONTIN) 100 MG capsule Take one PO at bedtime X1 week, then 2 PO at bedtime x 1 week, then 3 PO at bedtime 90 capsule 0     lisinopril (PRINIVIL,ZESTRIL) 10 MG tablet Take 10 mg by mouth daily.       LORazepam (ATIVAN) 1 MG tablet Take 1-2 tablets (1-2 mg total) by mouth every 6 (six) hours as needed for anxiety. 20 tablet 0     olopatadine 0.2 % Drop Apply to eye.       omeprazole (PRILOSEC) 20 MG capsule Take 20 mg by mouth daily before breakfast.       PREMARIN 0.9 mg tablet        Current Facility-Administered Medications   Medication Dose Route Frequency  "Provider Last Rate Last Dose     cyanocobalamin injection 1,000 mcg  1,000 mcg Intramuscular Q30 Days Shani Barker MD   1,000 mcg at 08/22/18 1621       Family History   Problem Relation Age of Onset     Cancer Father 72     Bladder Cancer     Multiple sclerosis Mother      No Medical Problems Brother         reports that she has never smoked. She has never used smokeless tobacco. She reports that she drinks alcohol. She reports that she does not use illicit drugs.    Review of Systems -   The 12 point review of systems  is within normal limits except for as mentioned above in the HPI.  General ROS: No complaints or constitutional symptoms  Ophthalmic ROS: No complaints of visual changes  Skin: No complaints or symptoms   Endocrine: No complaints or symptoms  Hematologic/Lymphatic: No symptoms or complaints  Psychiatric: No symptoms or complaints  Respiratory ROS: no cough, shortness of breath, or wheezing  Cardiovascular ROS: no chest pain or dyspnea on exertion  Gastrointestinal ROS: As per HPI  Genito-Urinary ROS: no dysuria, trouble voiding, or hematuria  Musculoskeletal ROS: no joint or muscle pain  Neurological ROS: no TIA or stroke symptoms      /70  Resp 18  Ht 5' 6.75\" (1.695 m)  Wt 154 lb (69.9 kg)  LMP 07/10/1984  SpO2 100%  BMI 24.3 kg/m2  Body mass index is 24.3 kg/(m^2).    EXAM:  GENERAL: Well developed female  HEENT: Extra ocular muscles intact, pupils are round and reactive, sclera is anicteric,   NECK:  No obvious masses or deformities  CARDIAC: RRR w/out murmur   CHEST/LUNG: Clear to auscultation bilaterally  ABDOMEN: Soft, tenderness palpation over the left and right lower quadrants, well-healed incision, small palpable left spigelian hernia noted  NEURO: No obvious defects noted.  EXT: No edema, no obvious deformities or any other abnormalities    IMAGES:   CT ABDOMEN PELVIS W ORAL W IV CONTRAST  4/6/2018 11:58 AM      INDICATION: Generalized abdominal pain.  TECHNIQUE: CT " abdomen and pelvis. Multiplanar reformation images (MPR). Dose reduction techniques were used.   IV CONTRAST: 100 mL Omnipaque 350.  COMPARISON: 10/20/2008.     FINDINGS:  LUNG BASES: Minimal basilar atelectasis or scarring.     ABDOMEN: Unremarkable liver, gallbladder, pancreas, spleen, adrenals and kidneys. Normal caliber aorta with moderate plaque. Incidental circumaortic left renal vein. Tiny fat-containing periumbilical hernia. No adenopathy. No free fluid or air.   Decompressed stomach, limiting evaluation for wall thickening.     PELVIS: Mild colonic diverticulosis without wall thickening or surrounding stranding. Appendix not seen; no right lower quadrant reformation. Decompressed bladder. No free fluid.     MUSCULOSKELETAL: Bony demineralization. Degenerative changes spine. Redemonstrated lipoma lateral to the right iliac bone.     IMPRESSION:   CONCLUSION:     1.  No definitive findings to explain symptoms.      2.  Diverticulosis without diverticulitis. No bowel obstruction.       Assessment/Plan:    Anais Kelly is a 68 y.o. female with a small left-sided spigelian hernia.  The pathophysiology of these hernias was discussed as were the surgical and non-operative management strategies.  Additionally, she unfortunately has chronic pain from her abdominoplasty she had several years ago.  I had an extensive discussion with her regarding postoperative pain and the lack of success with surgical intervention to relieve that pain.  I did discuss that we could fix her fat-containing Spigellian hernia noted on the left.  This was not mentioned on the CAT scan but on my exam I am able to appreciate it and this is corroborated by the findings that I can see on the CAT scan as well.  She would like to hold off on surgery which I think is reasonable.  I have recommended that she begin physical therapy to see if she can stretch out the muscles which are causing her discomfort from her original surgery.  If she  continues with physical therapy but continues to have pain that she can follow-up with me at any time and we can discuss her options regarding the  hernia.        Markie Lind D.O. [facility]  [phone]  [facility] Department of Surgery

## 2021-06-20 NOTE — LETTER
Letter by Lopez Gordon MD at      Author: Lopez Gordon MD Service: -- Author Type: --    Filed:  Encounter Date: 1/28/2020 Status: (Other)         Anais Kelly  [address]  [facility] [address]             January 28, 2020         Dear Ms. Kelly,    Below are the results from your recent visit:    Resulted Orders   Comprehensive Metabolic Panel   Result Value Ref Range    Sodium 141 136 - 145 mmol/L    Potassium 4.1 3.5 - 5.0 mmol/L    Chloride 103 98 - 107 mmol/L    CO2 30 22 - 31 mmol/L    Anion Gap, Calculation 8 5 - 18 mmol/L    Glucose 87 70 - 125 mg/dL    BUN 17 8 - 28 mg/dL    Creatinine 0.78 0.60 - 1.10 mg/dL    GFR MDRD Af Amer >60 >60 mL/min/1.73m2    GFR MDRD Non Af Amer >60 >60 mL/min/1.73m2    Bilirubin, Total 0.5 0.0 - 1.0 mg/dL    Calcium 10.7 (H) 8.5 - 10.5 mg/dL    Protein, Total 6.7 6.0 - 8.0 g/dL    Albumin 3.9 3.5 - 5.0 g/dL    Alkaline Phosphatase 91 45 - 120 U/L    AST 17 0 - 40 U/L    ALT 12 0 - 45 U/L    Narrative    Fasting Glucose reference range is 70-99 mg/dL per  American Diabetes Association (ADA) guidelines.   HM2(CBC w/o Differential)   Result Value Ref Range    WBC 6.0 4.0 - 11.0 thou/uL    RBC 4.60 3.80 - 5.40 mill/uL    Hemoglobin 14.2 12.0 - 16.0 g/dL    Hematocrit 44.2 35.0 - 47.0 %    MCV 96 80 - 100 fL    MCH 30.9 27.0 - 34.0 pg    MCHC 32.1 32.0 - 36.0 g/dL    RDW 12.1 11.0 - 14.5 %    Platelets 249 140 - 440 thou/uL    MPV 7.2 7.0 - 10.0 fL   Lipid Cascade   Result Value Ref Range    Cholesterol 273 (H) <=199 mg/dL    Triglycerides 110 <=149 mg/dL    HDL Cholesterol 82 >=50 mg/dL    LDL Calculated 169 (H) <=129 mg/dL    Patient Fasting > 8hrs? Yes        Kidney and liver tests are normal.  Blood sugar is normal.  Calcium level is okay.    Hemoglobin and blood counts are normal.    Your total cholesterol is moderately high, but you have a very high HDL, or good cholesterol, which can be protective.  I would not recommend a cholesterol medication for you at this  time.  But, if you wish to screen for evidence of coronary artery disease, you could consider a cardiac CT calcium score scan.  If your coronary arteries showed evidence of calcium, which can be a marker for cholesterol plaque, you may benefit from a cholesterol medication.  Let me know if you wish to discuss cholesterol or consider a cardiac CT scan this year.  Otherwise, follow-up in 1 year for recheck.  A Mediterranean, heart-healthy diet is recommended.    Please call with questions or contact us using [employer].    Sincerely,        Electronically signed by Lopez Gordon MD

## 2021-06-20 NOTE — LETTER
Letter by Lopez Gordon MD at      Author: Lopez Gordon MD Service: -- Author Type: --    Filed:  Encounter Date: 3/2/2020 Status: (Other)         Anais Kelly  [address]  [facility] [address]             March 2, 2020         Dear Ms. Kelly,    Below are the results from your recent visit:    Resulted Orders   CT Cardiac Calcium Score   Result Value Ref Range    Agatston Score of Left Main 9     Agatston Score of the Left Anterior Descending 195     Agatston Score of Circumflex 0     Agatston Score of Right Coronary Artery 29     Total Score 233.00     Narrative      The total Agatston calcium score is 233. A calcium score in this range   places the individual in the 75th percentile when compared to an age and   gender matched control group and implies a high risk of cardiac events in   the next ten years.    On limited imaging the ascending aorta appears possibly mild to   moderately enlarged. Consider complete CT of the thoracic aorta or MRI of   the thoracic aorta to further evaluate.    Please see separate report by radiology for additional findings          You do have significant coronary artery calcification.  This does likely represent the start of coronary artery disease.  Please make an appointment to see me in clinic to discuss treatment plan for this, with possibly a cholesterol medication.    Please call with questions or contact us using [employer].    Sincerely,        Electronically signed by Lopez Gordon MD

## 2021-06-20 NOTE — PROGRESS NOTES
[facility]   [facility]-Pelvic Initial Evaluation    Patient Name: Anais Kelly  Date of evaluation: 9/25/2018  Referral Diagnosis: abdominal pain  Referring provider: Nimesh Lind DO  Visit Diagnosis:     ICD-10-CM    1. Generalized abdominal pain R10.84    2. Myofascial pain M79.1        Assessment:        Anais Kelly is a 68 y.o. female who presents to PT today with abdominal pain. She is limited with pain but is able to complete all daily tasks in spite of her pain levels. There is pain immediately with standing/walking and she does get increased pain when sitting as well. She does have signfiicant fascial restriction present which will contribute to her current sx. She is appropriate for skilled PT to allow her to reach all stated goals.   She did have a good understanding of pain today and this should help to decrease her overall CNS drive and decrease her pain levels. We will continue to discuss this in more detail as time allows within treatment sessions.     Goals:  Pt. will demonstrate/verbalize independence in self-management of condition in : 12 weeks  Pt. will report decreased intensity, frequency of : Pain;in 6 weeks;Comment  Comment:: decrease pain from 0-8/10 to 0-4/10 with ADLs.   Pt. will be able to walk : 20 minutes;on even surfaces;with less pain;with less difficulty;for household mobility;for community mobility;for exercise/recreation;in 12 weeks  Patient will sit: 10 minutes;for driving;for watching TV;with less difficultty;with no pain;in 12 weeks;Comment  Comment: without having to slouch in her chair  No Data Recorded    Patient's expectations/goals are realistic    Barriers to Learning or Achieving Goals:  Chronicity of the problem.       Plan / Patient Instructions:        Plan of Care:   Communication with: Referral Source  Patient Related Instruction: Nature of Condition;Treatment plan and rationale;Self Care instruction;Basis of treatment;Body  "mechanics;Posture;Expected outcome  Times per Week: 1  Number of Weeks: 12  Number of Visits: 12  Discharge Planning: discharge to I HEP once all goals are met  Therapeutic Exercise: ROM;Stretching;Strengthening  Neuromuscular Reeducation: posture;balance/proprioception;core;kinesio tape;TNE;postural restoration  Manual Therapy: soft tissue mobilization;myofascial release;joint mobilization;muscle energy;strain counterstrain    Plan for next visit: initiate SCS/MT and progress ex as tolerated.      Start her on a basic HEP for general exercise     Subjective:         Social information:   Occupation:retired   Work Status:NA    History of Present Illness:    Anais is a 68 y.o. female who presents to therapy today with complaints of abdominal pain.  She had an abdominoplasty in Jan of 2015. Prior to that she had 3 previous abdominal surgeries. From the early surgeries she did have a \"hole\" in her abdomen. It was fixed with \"400-500 stiches\". She has followed up with MD but he did not have an anser for her pain.   The pain is above her scar in her mid abdomen.   Function: there is pain at all times except when laying flat or really reclined. Bladder function is fine. Bowel function is \"odd\" - has been told that she has IBS. Bowels are affected with foods. She doesn't stop doing anything despite her pain. Sitting is usually the worst. She cannot tolerate pants/underwear across her lower abdomen.    There is a small hernia as well in the left side of her abdomen.   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 R RC repair ~8 yrs ago. Denies N/T in the saddle distribution.   She describes their previous level of function as not limited.     Pain Rating:3-4  Pain rating at best: 0  Pain rating at worst: 8  Pain description: aching, dull, pain, sharp and shooting    Patient reports benefit from:  rest  , massage          Objective:      Note: Items left blank indicates the item was not performed or not " indicated at the time of the evaluation.      Examination  1. Generalized abdominal pain     2. Myofascial pain       Precautions/Restrictions: None  Involved side: Bilateral  Posture Observation:    Standing:   Seated:    Lumbar ROM:  9/25/2018   Date:      *Indicate scale AROM AROM AROM   Lumbar Flexion      Lumbar Extension       Right Left Right Left Right Left   Lumbar Sidebending         Lumbar Rotation         Thoracic Flexion      Thoracic Extension      Cervical Rotation         Thoracic Rotation 60 61         Palpation: Hypomobile fascia of entire abdomen. It does seem to be more lower/middle and not as much in her upper abdominal cavity. She does have restriction present in her scar as well    MMT: we did not test today due to her irritability with even sitting. We will address this when we are able.     Treatment Today   9/25/2018   TREATMENT MINUTES COMMENTS   Evaluation 25    Self-care/ Home management     Manual therapy     Neuromuscular Re-education 20 PNE Education on neurophysiology of pain and how it relates to her current sytmpoms   Therapeutic Activity     Therapeutic Exercises 10 Ed on POC/Rx/Fascia/Anatomy    Gait training     Modality__________________                Total 55    Blank areas are intentional and mean the treatment did not include these items.       PT Evaluation Code: (Please list factors)  Patient History/Comorbidities: 3+  Examination: 1-2  Clinical Presentation: stable  Clinical Decision Making: low    Patient History/  Comorbidities Examination  (body structures and functions, activity limitations, and/or participation restrictions) Clinical Presentation Clinical Decision Making (Complexity)   No documented Comorbidities or personal factors 1-2 Elements Stable and/or uncomplicated Low   1-2 documented comorbidities or personal factor 3 Elements Evolving clinical presentation with changing characteristics Moderate   3-4 documented comorbidities or personal factors 4 or more  Unstable and unpredictable High               Bennie Hernandez PT, ATC  9/25/2018  4:33 PM

## 2021-06-20 NOTE — PROGRESS NOTES
Chief Complaint   Patient presents with     Injections     b12     b12 injection given today. Pt was early for her injection but per emely Bird to give early today.     Aurelia Miller,[facility] 9/13/2018 9:21 AM.

## 2021-06-20 NOTE — PROGRESS NOTES
[facility] Daily Progress     Patient Name: Anais Kelly  Date: 2018  Visit #: 2  Referral Diagnosis: Abdominal pain, epigastric  Referring provider: Nimesh Lind DO  Visit Diagnosis:     ICD-10-CM    1. Generalized abdominal pain R10.84    2. Myofascial pain M79.1          Assessment:     Patient is benefitting from skilled physical therapy and is making steady progress toward functional goals.  Patient is appropriate to continue with skilled physical therapy intervention, as indicated by initial plan of care.  Challenged with TA engagement but will continue to work on the exercise until contraction of muscles gets stronger to help support    Goal Status:  Pt. will demonstrate/verbalize independence in self-management of condition in : 12 weeks  Pt. will report decreased intensity, frequency of : Pain;in 6 weeks;Comment  Comment:: decrease pain from 0-8/10 to 0-4/10 with ADLs.   Pt. will be able to walk : 20 minutes;on even surfaces;with less pain;with less difficulty;for household mobility;for community mobility;for exercise/recreation;in 12 weeks  Patient will sit: 10 minutes;for driving;for watching TV;with less difficultty;with no pain;in 12 weeks;Comment  Comment: without having to slouch in her chair  No Data Recorded    Plan / Patient Education:      initiate SCS/MT and progress ex as tolerated.   Start her on a basic HEP for general exercise  Continue abdominal and core strengthening    Subjective:     Pain Ratin    No specific changes since last visit.        Objective:   Barriers to Learning or Achieving Goals: Chronicity of the problem.    Exercises:  Exercise #1: Stretches: sitting hamstring, supine hip flexor, supine piriformis, SKC  Comment #1:   hold 30 seconds X 1-3 reps  Exercise #2: TA engagement  hold 5-10 seconds X 5-10   50% effort    Treatment Today     TREATMENT MINUTES COMMENTS   Evaluation     Self-care/ Home management     Manual therapy     Neuromuscular  Re-education 10 Ta engagement   Therapeutic Activity     Therapeutic Exercises 25 See above or flow sheet   Gait training     Modality__________________                Total 35    Blank areas are intentional and mean the treatment did not include these items.       Ariadne Koenig, PT  9/28/2018

## 2021-06-20 NOTE — LETTER
Letter by Lopez Gordon MD at      Author: Lopez Gordon MD Service: -- Author Type: --    Filed:  Encounter Date: 7/20/2020 Status: (Other)         Anais Kelly  [address]  [facility] [address]             July 20, 2020         Dear MsWillow Kelly,    Below are the results from your recent visit:    Resulted Orders   CTA Chest Abdomen Pelvis    Narrative    EXAM: CTA CHEST ABDOMEN PELVIS  LOCATION: [facility]  DATE/TIME: 7/15/2020 1:36 PM    INDICATION: Thoracic aortic aneurysm suspected. New left lower quadrant abdominal pain.  Ascending aortic enlargement noted on CT scan in February.  Evaluate for aneurysm.  Also evaluate for left lower quadrant abdominal pain.  COMPARISON: Limited CT of the chest 4 coronary calcium score 02/28/2020; CT of the abdomen and pelvis without IV contrast 04/06/2018  TECHNIQUE: CT angiogram chest abdomen pelvis during arterial phase of injection of IV contrast. 2D and 3D MIP reconstructions were performed by the CT technologist. Dose reduction techniques were used.   CONTRAST: Iohexol (Omni) 100 mL    FINDINGS:   CT ANGIOGRAM CHEST, ABDOMEN, AND PELVIS: Cardiac pulsation artifact blurs the aortic root. Sinotubular junction is preserved. There is mild fusiform dilation of the ascending aorta which measures 4.0 x 4.1 cm (series 6, image 125) at the level of the   main pulmonary artery. 2 vessel arch anatomy. The imaged proximal great vessels are widely patent. Mild mixed calcified/noncalcified atheroma in the descending thoracic aorta.    The abdominal aorta is normal caliber. Mild atheromatous calcifications of the infrarenal abdominal aorta and common iliac arteries. No aortoiliac aneurysm or stenosis. Both common femoral arteries have mild posterior wall calcification but are normal   caliber.     Celiac axis, SMA, and ALBERT are widely patent. Conventional hepatic arterial anatomy. Single bilateral renal arteries. No vessel wall thickening, abnormal tortuosity or early  branch truncation.    LUNGS AND PLEURA: The lungs are symmetrically inflated and clear. Normal airways. No pleural space abnormality.    MEDIASTINUM: Cardiac chambers are normal in size. No pericardial effusion. Main pulmonary artery is normal in size. No pulmonary artery filling defects. Decompressed esophagus. No thoracic lymphadenopathy. Imaged thyroid gland is normal.    HEPATOBILIARY: Small flash enhancing hemangioma in the anterior left lobe of the liver measuring around a centimeter in size. No other hepatic parenchymal lesions. Gallbladder is normal without gallstones. No bile duct dilation.    PANCREAS: Normal.    SPLEEN: Normal.    ADRENAL GLANDS: Normal.    KIDNEYS/BLADDER: Kidneys are normal in size without nephrolithiasis or obstructive uropathy. Ureters are decompressed as is the urinary bladder.    BOWEL: Stomach is decompressed. Normal caliber small bowel. Sigmoid diverticulosis but no wall thickening or pericolonic inflammation to suggest acute diverticulitis.    LYMPH NODES: Normal.    PELVIC ORGANS: The uterus is absent. No adnexal mass. No pelvic free fluid.    MUSCULOSKELETAL: Bilateral glenohumeral osteoarthrosis with fat atrophy of rotator cuff musculature, slightly worse on the right. Thoracolumbar degenerative osteophytes are present. Lower lumbar facet arthropathy. Right greater than left SI joint   osteoarthrosis. Bilateral hip osteoarthrosis. 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      Impression    1.  Mild fusiform dilation of the ascending aorta measuring 4.0 x 4.1 cm. Mild descending and abdominal aorta atheromatous calcifications but no abdominal aortic or iliac artery aneurysm.  2.  Diverticulosis without findings of diverticulitis.         Just mild dilatation of ascending aorta.  No abdominal aorta.  Plan to repeat CT evaluation of aorta in 1 to 2 years.    Lower abdominal pain consistent  with diverticulosis, but there was no diverticulitis.  Contact clinic if worsening abdominal pain.  Otherwise, no change in treatment plan at this time.    Please call with questions or contact us using [email or web address].    Sincerely,        Electronically signed by Lopez Gordon MD

## 2021-06-20 NOTE — LETTER
Letter by Lopez Gordon MD at      Author: Lopez Gordon MD Service: -- Author Type: --    Filed:  Encounter Date: 7/9/2020 Status: (Other)         Anais Kelly  [address]  [facility] [address]             July 9, 2020         Dear Ms. Kelly,    Below are the results from your recent visit:    Resulted Orders   Comprehensive Metabolic Panel   Result Value Ref Range    Sodium 141 136 - 145 mmol/L    Potassium 4.3 3.5 - 5.0 mmol/L    Chloride 106 98 - 107 mmol/L    CO2 27 22 - 31 mmol/L    Anion Gap, Calculation 8 5 - 18 mmol/L    Glucose 84 70 - 125 mg/dL    BUN 15 8 - 28 mg/dL    Creatinine 0.79 0.60 - 1.10 mg/dL    GFR MDRD Af Amer >60 >60 mL/min/1.73m2    GFR MDRD Non Af Amer >60 >60 mL/min/1.73m2    Bilirubin, Total 0.5 0.0 - 1.0 mg/dL    Calcium 10.6 (H) 8.5 - 10.5 mg/dL    Protein, Total 6.6 6.0 - 8.0 g/dL    Albumin 3.8 3.5 - 5.0 g/dL    Alkaline Phosphatase 90 45 - 120 U/L    AST 14 0 - 40 U/L    ALT 14 0 - 45 U/L    Narrative    Fasting Glucose reference range is 70-99 mg/dL per  American Diabetes Association (ADA) guidelines.   CK Total   Result Value Ref Range    CK, Total 40 30 - 190 U/L   Lipid Cascade   Result Value Ref Range    Cholesterol 269 (H) <=199 mg/dL    Triglycerides 112 <=149 mg/dL    HDL Cholesterol 72 >=50 mg/dL    LDL Calculated 175 (H) <=129 mg/dL    Patient Fasting > 8hrs? Yes    HM2(CBC w/o Differential)   Result Value Ref Range    WBC 6.0 4.0 - 11.0 thou/uL    RBC 4.47 3.80 - 5.40 mill/uL    Hemoglobin 14.3 12.0 - 16.0 g/dL    Hematocrit 42.3 35.0 - 47.0 %    MCV 94 80 - 100 fL    MCH 31.9 27.0 - 34.0 pg    MCHC 33.8 32.0 - 36.0 g/dL    RDW 12.9 11.0 - 14.5 %    Platelets 244 140 - 440 thou/uL    MPV 7.0 7.0 - 10.0 fL       CK, muscle test, is normal.    Kidney and liver tests are normal.  Calcium level is okay and stable.    Significantly high LDL cholesterol level.  Start atorvastatin as planned.  Recheck your CK and liver tests in 4 to 6 weeks, as  planned.    Hemoglobin and blood counts are normal.    Please call with questions or contact us using [employer].    Sincerely,        Electronically signed by Lopez Gordon MD

## 2021-06-21 NOTE — PROGRESS NOTES
[facility] Daily Progress     Patient Name: Anais Kelly  Date: 10/24/2018  Visit #: 3  Referral Diagnosis: Abdominal pain, epigastric  Referring provider: Nimesh Lind DO  Visit Diagnosis:     ICD-10-CM    1. Generalized abdominal pain R10.84    2. Myofascial pain M79.18          Assessment:     Patient is benefitting from skilled physical therapy and is making steady progress toward functional goals.  Patient is appropriate to continue with skilled physical therapy intervention, as indicated by initial plan of care.  SHe did feel a little more pain in her lower abdomen post treatment even with light work done. She did have very good release of other fascial tensions with treatment. .     Goal Status:  Pt. will demonstrate/verbalize independence in self-management of condition in : 12 weeks  Pt. will report decreased intensity, frequency of : Pain;in 6 weeks;Comment  Comment:: decrease pain from 0-8/10 to 0-4/10 with ADLs.   Pt. will be able to walk : 20 minutes;on even surfaces;with less pain;with less difficulty;for household mobility;for community mobility;for exercise/recreation;in 12 weeks  Patient will sit: 10 minutes;for driving;for watching TV;with less difficultty;with no pain;in 12 weeks;Comment  Comment: without having to slouch in her chair  No Data Recorded    Plan / Patient Education:      initiate SCS/MT and progress ex as tolerated.   Start her on a basic HEP for general exercise  Continue abdominal and core strengthening    Subjective:     Pain Ratin   It helped for about 24 hours after the last Rx. It was really pretty good for that time frame.   Today she is really sore across the whole lower abdomen.      Objective:   Barriers to Learning or Achieving Goals: Chronicity of the problem.    Art, MS, visc fascial tensions.     Treatment Today     TREATMENT MINUTES COMMENTS   Evaluation     Self-care/ Home management     Manual therapy 40 Fascial release using  Strain-Counterstrain of B C2-3LF-MS, B abdominal-Art  MFR: median umbilical ligament   Neuromuscular Re-education     Therapeutic Activity     Therapeutic Exercises     Gait training     Modality__________________                Total 40    Blank areas are intentional and mean the treatment did not include these items.       Bennie Hernandez, PT  10/24/2018

## 2021-06-21 NOTE — PROGRESS NOTES
[facility] Daily Progress     Patient Name: Anais Kelly  Date: 2018  Visit #: 7  Referral Diagnosis: Abdominal pain, epigastric  Referring provider: Nimesh Lind DO  Visit Diagnosis:     ICD-10-CM    1. Generalized abdominal pain R10.84    2. Myofascial pain M79.18          Assessment:     Patient is benefitting from skilled physical therapy and is making steady progress toward functional goals.  Patient is appropriate to continue with skilled physical therapy intervention, as indicated by initial plan of care.  The knees did feel better but the lower back was a little sore after getting up from the table. There was very good release of fascial tensions treated today.     Goal Status:  Pt. will demonstrate/verbalize independence in self-management of condition in : 12 weeks  Pt. will report decreased intensity, frequency of : Pain;in 6 weeks;Comment  Comment:: decrease pain from 0-8/10 to 0-4/10 with ADLs.   Pt. will be able to walk : 20 minutes;on even surfaces;with less pain;with less difficulty;for household mobility;for community mobility;for exercise/recreation;in 12 weeks  Patient will sit: 10 minutes;for driving;for watching TV;with less difficultty;with no pain;in 12 weeks;Comment  Comment: without having to slouch in her chair    No Data Recorded    Plan / Patient Education:     initiate SCS/MT and progress ex as tolerated.   Start her on a basic HEP for general exercise   Continue abdominal and core strengthening     Subjective:     Pain Ratin  .    She isn't as sore all the time so she is feeling like it is better.    She felt fine after the last Rx.     Objective:   Barriers to Learning or Achieving Goals: Chronicity of the problem.    Art fascial tensions.     Treatment Today     TREATMENT MINUTES COMMENTS   Evaluation     Self-care/ Home management     Manual therapy 55 Fascial release using Strain-Counterstrain of BLE/lumbopelvic-Art   Neuromuscular Re-education      Therapeutic Activity     Therapeutic Exercises     Gait training     Modality__________________                Total 55    Blank areas are intentional and mean the treatment did not include these items.       Bennie Hernandez, PT  11/23/2018

## 2021-06-21 NOTE — LETTER
Letter by Lopez Gordno MD at      Author: Lopez Gordon MD Service: -- Author Type: --    Filed:  Encounter Date: 2/15/2021 Status: (Other)         Anais Kelly  [address]  [facility] [id number]             February 15, 2021         Dear Ms. Kelly,    Below are the results from your recent visit:    Resulted Orders   Comprehensive Metabolic Panel   Result Value Ref Range    Sodium 142 136 - 145 mmol/L    Potassium 4.2 3.5 - 5.0 mmol/L    Chloride 106 98 - 107 mmol/L    CO2 29 22 - 31 mmol/L    Anion Gap, Calculation 7 5 - 18 mmol/L    Glucose 92 70 - 125 mg/dL    BUN 16 8 - 28 mg/dL    Creatinine 0.75 0.60 - 1.10 mg/dL    GFR MDRD Af Amer >60 >60 mL/min/1.73m2    GFR MDRD Non Af Amer >60 >60 mL/min/1.73m2    Bilirubin, Total 0.6 0.0 - 1.0 mg/dL    Calcium 10.3 8.5 - 10.5 mg/dL    Protein, Total 6.8 6.0 - 8.0 g/dL    Albumin 4.0 3.5 - 5.0 g/dL    Alkaline Phosphatase 105 45 - 120 U/L    AST 15 0 - 40 U/L    ALT 11 0 - 45 U/L    Narrative    Fasting Glucose reference range is 70-99 mg/dL per  American Diabetes Association (ADA) guidelines.   HM2(CBC w/o Differential)   Result Value Ref Range    WBC 6.5 4.0 - 11.0 thou/uL    RBC 4.49 3.80 - 5.40 mill/uL    Hemoglobin 14.1 12.0 - 16.0 g/dL    Hematocrit 43.2 35.0 - 47.0 %    MCV 96 80 - 100 fL    MCH 31.4 27.0 - 34.0 pg    MCHC 32.6 32.0 - 36.0 g/dL    RDW 12.6 11.0 - 14.5 %    Platelets 242 140 - 440 thou/uL    MPV 8.9 7.0 - 10.0 fL   Lipid Cascade   Result Value Ref Range    Cholesterol 195 <=199 mg/dL    Triglycerides 89 <=149 mg/dL    HDL Cholesterol 76 >=50 mg/dL    LDL Calculated 101 <=129 mg/dL    Patient Fasting > 8hrs? Yes    Vitamin D, Total (25-Hydroxy)   Result Value Ref Range    Vitamin D, Total (25-Hydroxy) 50.7 30.0 - 80.0 ng/mL    Narrative    Deficiency <10.0 ng/mL  Insufficiency 10.0-29.9 ng/mL  Sufficiency 30.0-80.0 ng/mL  Toxicity (possible) >100.0 ng/mL   Vitamin B12   Result Value Ref Range    Vitamin B-12 858 (H) 213 - 816 pg/mL    Methylmalonic Acid (MMA), Quantitative   Result Value Ref Range    MMA Serum/Plasma, Vitamin B12 Status 0.17 0.00 - 0.40 umol/L      Comment:      INTERPRETIVE INFORMATION: MMA Serum/Plasma,                             Vitamin B12 Status    This test was developed and its performance characteristics   determined by [facility]. It has not been cleared or   approved by the US Food and Drug Administration. This test was   performed in a CLIA certified laboratory and is intended for   clinical purposes.  Performed By: [facility]  [address] [place] [id number]  : Dariana Washington MD       Vitamin B12 level is okay, you are not deficient.  MMA level is normal.  Continue on same B12 supplement.    Vitamin D level is just right, continue on same vitamin D supplement.    Kidney and liver tests are normal.    Blood sugar is normal.    LDL cholesterol level is well controlled.    Hemoglobin and blood counts are normal.    Labs look good.  No change in treatment plan.    Please call with questions or contact us using [employer].    Sincerely,        Electronically signed by Lopez Gordon MD

## 2021-06-21 NOTE — PROGRESS NOTES
[facility] Daily Progress     Patient Name: Anais Kelly  Date: 2018  Visit #: 5  Referral Diagnosis: Abdominal pain, epigastric  Referring provider: Nimesh Lind DO  Visit Diagnosis:     ICD-10-CM    1. Generalized abdominal pain R10.84    2. Myofascial pain M79.18          Assessment:     Patient is benefitting from skilled physical therapy and is making steady progress toward functional goals.  Patient is appropriate to continue with skilled physical therapy intervention, as indicated by initial plan of care.  Her abdomen did hurt again post treatment with the light work. She is still very tight in her middle abd.      Goal Status:  Pt. will demonstrate/verbalize independence in self-management of condition in : 12 weeks  Pt. will report decreased intensity, frequency of : Pain;in 6 weeks;Comment  Comment:: decrease pain from 0-8/10 to 0-4/10 with ADLs.   Pt. will be able to walk : 20 minutes;on even surfaces;with less pain;with less difficulty;for household mobility;for community mobility;for exercise/recreation;in 12 weeks  Patient will sit: 10 minutes;for driving;for watching TV;with less difficultty;with no pain;in 12 weeks;Comment  Comment: without having to slouch in her chair  No Data Recorded    Plan / Patient Education:     initiate SCS/MT and progress ex as tolerated.   Start her on a basic HEP for general exercise   Continue abdominal and core strengthening     Subjective:     Pain Ratin-9 pain range.    The abdomen is feeling about the same.    She feels like the food moves through her and there are some sharp pains following it through her gut.   Bowel patterns have been unpredictable. She isn't sure if there are changes in that.     Objective:   Barriers to Learning or Achieving Goals: Chronicity of the problem.    Visc, LV fascial tensions.     Treatment Today     TREATMENT MINUTES COMMENTS   Evaluation     Self-care/ Home management     Manual therapy 55 Fascial  release using Strain-Counterstrain of B middle abd-V, B abdominal-LV  MFR: abdominal fascia    Neuromuscular Re-education     Therapeutic Activity     Therapeutic Exercises     Gait training     Modality__________________                Total 55    Blank areas are intentional and mean the treatment did not include these items.       Bennie Hernandez, PT  11/5/2018

## 2021-06-21 NOTE — PROGRESS NOTES
[facility] Daily Progress     Patient Name: Anais Kelly  Date: 2018  Visit #: 6  Referral Diagnosis: Abdominal pain, epigastric  Referring provider: Nimesh Lind DO  Visit Diagnosis:     ICD-10-CM    1. Generalized abdominal pain R10.84    2. Myofascial pain M79.18          Assessment:     Patient is benefitting from skilled physical therapy and is making steady progress toward functional goals.  Patient is appropriate to continue with skilled physical therapy intervention, as indicated by initial plan of care.  She did still feel some of the soreness in her neck post treatment. There was less apprehension/soreness with abdominal palpation today.     Goal Status:  Pt. will demonstrate/verbalize independence in self-management of condition in : 12 weeks  Pt. will report decreased intensity, frequency of : Pain;in 6 weeks;Comment  Comment:: decrease pain from 0-8/10 to 0-4/10 with ADLs.   Pt. will be able to walk : 20 minutes;on even surfaces;with less pain;with less difficulty;for household mobility;for community mobility;for exercise/recreation;in 12 weeks  Patient will sit: 10 minutes;for driving;for watching TV;with less difficultty;with no pain;in 12 weeks;Comment  Comment: without having to slouch in her chair    No Data Recorded    Plan / Patient Education:     initiate SCS/MT and progress ex as tolerated.   Start her on a basic HEP for general exercise   Continue abdominal and core strengthening     Subjective:     Pain Ratin  .    She isn't as sore all the time so she is feeling like it is better.    She felt fine after the last Rx.     Objective:   Barriers to Learning or Achieving Goals: Chronicity of the problem.    Visc, neural fascial tensions.     Treatment Today     TREATMENT MINUTES COMMENTS   Evaluation     Self-care/ Home management     Manual therapy 55 Fascial release using Strain-Counterstrain of B thoracic/abdominal pre-gang-N, B lumbar/lower thor post-gang-N, B  lower abd-V   Neuromuscular Re-education     Therapeutic Activity     Therapeutic Exercises     Gait training     Modality__________________                Total 55    Blank areas are intentional and mean the treatment did not include these items.       Bennie Hernandez, PT  11/16/2018

## 2021-06-22 NOTE — PROGRESS NOTES
"[facility] Daily Progress     Patient Name: Anais Kelly  Date: 2018  Visit #: 10  Referral Diagnosis: Abdominal pain, epigastric  Referring provider: Nimesh Lind DO  Visit Diagnosis:     ICD-10-CM    1. Generalized abdominal pain R10.84    2. Myofascial pain M79.18          Assessment:     Patient is benefitting from skilled physical therapy and is making steady progress toward functional goals.  Patient is appropriate to continue with skilled physical therapy intervention, as indicated by initial plan of care.  The sigmoid colon does seem to be a strong connection to her symptoms. This is still tight but does seem to be improving.      Goal Status:  Pt. will demonstrate/verbalize independence in self-management of condition in : 12 weeks  Pt. will report decreased intensity, frequency of : Pain;in 6 weeks;Comment  Comment:: decrease pain from 0-8/10 to 0-4/10 with ADLs.   Pt. will be able to walk : 20 minutes;on even surfaces;with less pain;with less difficulty;for household mobility;for community mobility;for exercise/recreation;in 12 weeks  Patient will sit: 10 minutes;for driving;for watching TV;with less difficultty;with no pain;in 12 weeks;Comment  Comment: without having to slouch in her chair    No Data Recorded    Plan / Patient Education:     initiate SCS/MT and progress ex as tolerated.   Start her on a basic HEP for general exercise   Continue abdominal and core strengthening     Subjective:     Pain Ratin-8 pain range.     She feels like the belly is \"pretty much it's usual\" now. She does know that eating certain things will really affect her.   Depends on the day on whether her belly is less tender to touch.    Sitting/standing does seem better. She does feel like she would be able to go 30' or so sitting now. Driving the other day was uncomfortable after an hour drive.        Objective:     Barriers to Learning or Achieving Goals: Chronicity of the problem.       Art, " visc fascial tensions.       Treatment Today     TREATMENT MINUTES COMMENTS   Evaluation     Self-care/ Home management     Manual therapy 55 Fascial release using Strain-Counterstrain of RLE/lumbopelvic/abdominal-art, B lower abd-V  MFR: sigmoid, mesenteric root.   Neuromuscular Re-education     Therapeutic Activity     Therapeutic Exercises     Gait training     Modality__________________                Total 55    Blank areas are intentional and mean the treatment did not include these items.       Bennie Hernandez, PT  12/11/2018

## 2021-06-22 NOTE — PROGRESS NOTES
ASSESSMENT and PLAN:  1. Adjustment disorder with anxiety  Her  is now retired and so is she.  MCC has not gone as she has as expected.  She and her  have had some disagreements about vacation plans and how to spend their days.  She would prefer to manage this without medication.  I am giving her a referral to psychology for psychotherapy.  She will follow-up with me on her progress.  - Ambulatory referral to Psychology    2. Breast pain, left  I did not find any physical exam findings to explain her pain.  I will have her get a diagnostic mammogram and ultrasound for additional evaluation.  My suspicion for underlying malignancy is low.  - US Breast Limited (Focal) Left; Future  - Mammo Diagnostic Bilateral; Future    Total time spent was 30 minutes, over half in counseling and coordination of care related to her anxiety    Medications Discontinued During This Encounter   Medication Reason     PREMARIN 0.9 mg tablet      olopatadine 0.2 % Drop Therapy completed     gabapentin (NEURONTIN) 100 MG capsule Therapy completed     Administrations This Visit     cyanocobalamin injection 1,000 mcg     Admin Date  12/12/2018 Action  Given Dose  1000 mcg Route  Intramuscular Administered By  Angelika Merlos CMA              Return in about 3 months (around 3/12/2019) for Recheck anxiety.    Patient Instructions   Flu shot and tetanus are due.    Please get us a copy of your healthcare directive when you can.    To set up a diagnostic mammogram and ultrasound:  [phone]    [facility] or [facility] in [place] would be options.  Both have website and information about the therapists and services available.    Take care!      CHIEF COMPLAINT:  Chief Complaint   Patient presents with     Anxiety     Breast Pain     Left side       HISTORY OF PRESENT ILLNESS:  Anais Kelly is a 68 y.o. female  presenting to the clinic today for a discussion of about anxiety.  She said that a year  ago in July she noticed that Lorazepam that she took for sleep quit working.  She was prescribed controlled release Ambien and took that for 3 doses and on the third night had a panic attack.  At the advice of a triage nurse, she was sent to the emergency room.  She was then prescribed Lorazepam.  She tells me though that she has had a lifelong history of anxiety.  She is a worrier.  It tends to affect her digestive system.  She recently had an incident with her  regarding a planned vacation.  She said they like to go on cruises and he was interested in a 28-day cruise.  There were 3 stretches where there would be 6 days in a row at .  She realized that she was very anxious and nervous about that.  She did not think she could spend 6 days at [employer] times on the same trip.  She said her  was not very understanding of this which she found upsetting because he himself and his family have struggled with anxiety and panic.  Approximately 2 weeks ago she canceled the trip.  She said her  still considers that to be the biggest disappointment in his life.  This is been a source of stress for them.  They have been  51 years and she said this is 1 of the biggest arguments they have ever had.  He is not open to counseling.  She is open to therapy for herself but does not want to take medication due to a history in the past of multiple medication intolerances.    She also has a concern about left-sided breast pain.  A friend of hers who is 68 called her this morning to tell her that she was diagnosed with breast cancer.  Maranda is in a coffee group of 8 women.  Of the 8 women, she is the only one who does not have breast cancer.  She had a 3D mammogram in July that was normal she has had a long-standing history of left-sided breast pain.  It is lateral and wraps around her breast.  If her breast is squeezed in the wrong way, it will be painful.  Her breast feels a little bit more full.  She has not  "had any nipple discharge or drainage.  She has not noticed any skin changes over that breast.    REVIEW OF SYSTEMS:    All other systems are negative.    PFSH:  Family History   Problem Relation Age of Onset     Cancer Father 72        Bladder Cancer     Multiple sclerosis Mother      No Medical Problems Brother      TOBACCO USE:  Social History     Tobacco Use   Smoking Status Never Smoker   Smokeless Tobacco Never Used       VITALS:  Vitals:    12/12/18 1502   BP: 100/66   Patient Site: Right Arm   Patient Position: Sitting   Cuff Size: Adult Regular   Pulse: 88   Weight: 157 lb 1.6 oz (71.3 kg)   Height: 5' 6.75\" (1.695 m)     Wt Readings from Last 3 Encounters:   12/12/18 157 lb 1.6 oz (71.3 kg)   09/12/18 154 lb (69.9 kg)   08/22/18 156 lb 6.4 oz (70.9 kg)         PHYSICAL EXAM:  Constitutional:  Reveals an alert, pleasant adult female.   Vitals:  Noted.   Psych: talkative  Breast: Normal skin overlying her left breast, no discrete mass palpable in the area of her discomfort.  Her breast laterally was slightly tender to palpation, no nipple discharge or retraction    QUALITY MEASURES:  The following high BMI interventions were performed this visit: weight monitoring    MEDICATIONS:  Current Outpatient Medications   Medication Sig Dispense Refill     calcium carbonate-vitamin D3 (CALTRATE 600 PLUS D3) 600 mg(1,500mg) -400 unit per tablet Take 1 tablet by mouth daily.       cholecalciferol, vitamin D3, (VITAMIN D3) 5,000 unit Tab Take 1 tablet by mouth daily.       dicyclomine (BENTYL) 10 MG capsule Take 10 mg by mouth 2 (two) times a day.       fluticasone (FLONASE) 50 mcg/actuation nasal spray into each nostril.       lisinopril (PRINIVIL,ZESTRIL) 10 MG tablet Take 10 mg by mouth daily.       LORazepam (ATIVAN) 1 MG tablet Take 1-2 tablets (1-2 mg total) by mouth every 6 (six) hours as needed for anxiety. (Patient taking differently: Take 1 mg by mouth at bedtime as needed .      ) 20 tablet 0     omeprazole " (PRILOSEC) 20 MG capsule Take 20 mg by mouth daily before breakfast.       RESTASIS 0.05 % ophthalmic emulsion TAKE 1 DROP(S) IN BOTH EYES 2 TIMES A DAY  3     estrogens, conjugated, (PREMARIN) 0.625 MG tablet Take 1 tablet (0.625 mg total) by mouth daily. 90 tablet 3     Current Facility-Administered Medications   Medication Dose Route Frequency Provider Last Rate Last Dose     cyanocobalamin injection 1,000 mcg  1,000 mcg Intramuscular Q30 Days Shani Barker MD   1,000 mcg at 12/12/18 1610

## 2021-06-22 NOTE — PROGRESS NOTES
[facility] Daily Progress     Patient Name: Anais Kelly  Date: 12/3/2018  Visit #: 9  Referral Diagnosis: Abdominal pain, epigastric  Referring provider: Nimesh Lind DO  Visit Diagnosis:     ICD-10-CM    1. Generalized abdominal pain R10.84    2. Myofascial pain M79.18          Assessment:     Patient is benefitting from skilled physical therapy and is making steady progress toward functional goals.  Patient is appropriate to continue with skilled physical therapy intervention, as indicated by initial plan of care.  She did feel less pain in her abdomen post treatment. She did say it felt almost numb with treatment. There is still considerable tenderness but it is better than it started. She needed light palpation for all releases across lower abdomen.     Goal Status:  Pt. will demonstrate/verbalize independence in self-management of condition in : 12 weeks  Pt. will report decreased intensity, frequency of : Pain;in 6 weeks;Comment  Comment:: decrease pain from 0-8/10 to 0-4/10 with ADLs.   Pt. will be able to walk : 20 minutes;on even surfaces;with less pain;with less difficulty;for household mobility;for community mobility;for exercise/recreation;in 12 weeks  Patient will sit: 10 minutes;for driving;for watching TV;with less difficultty;with no pain;in 12 weeks;Comment  Comment: without having to slouch in her chair    No Data Recorded    Plan / Patient Education:     initiate SCS/MT and progress ex as tolerated.   Start her on a basic HEP for general exercise   Continue abdominal and core strengthening     Subjective:     Pain Ratin-8 pain range.     She got a new pillow and didn't sleep good last night.   She just kind of aches all over this morning. She has found that alcohol will irritate her stomach - had a glass of champagne and this is the first one she has had in over a month.        Objective:     Barriers to Learning or Achieving Goals: Chronicity of the problem.       Art,  visc fascial tensions.       Treatment Today     TREATMENT MINUTES COMMENTS   Evaluation     Self-care/ Home management     Manual therapy 55 Fascial release using Strain-Counterstrain of LLE/lumbopelvic/abdominal-art  MFR: sigmoid, broad ligament, ovary, mesenteric root.   Neuromuscular Re-education     Therapeutic Activity     Therapeutic Exercises     Gait training     Modality__________________                Total 55    Blank areas are intentional and mean the treatment did not include these items.       Bennie Hernandez, PT  12/3/2018

## 2021-06-22 NOTE — PROGRESS NOTES
[facility] Daily Progress     Patient Name: Anais Kelly  Date: 2018  Visit #: 12  Referral Diagnosis: Abdominal pain, epigastric  Referring provider: Nimesh Lind DO  Visit Diagnosis:     ICD-10-CM    1. Generalized abdominal pain R10.84    2. Myofascial pain M79.18          Assessment:     Patient is benefitting from skilled physical therapy and is making steady progress toward functional goals.  Patient is appropriate to continue with skilled physical therapy intervention, as indicated by initial plan of care.  Se does continue to improve on her pain levels. There was some tenderness just inferior to umbilicus to the left today with treatment of EIL-LV.     Goal Status:  Pt. will demonstrate/verbalize independence in self-management of condition in : 12 weeks  Pt. will report decreased intensity, frequency of : Pain;in 6 weeks;Comment  Comment:: decrease pain from 0-8/10 to 0-4/10 with ADLs.   Pt. will be able to walk : 20 minutes;on even surfaces;with less pain;with less difficulty;for household mobility;for community mobility;for exercise/recreation;in 12 weeks  Patient will sit: 10 minutes;for driving;for watching TV;with less difficultty;with no pain;in 12 weeks;Comment  Comment: without having to slouch in her chair        Plan / Patient Education:     initiate SCS/MT and progress ex as tolerated.   Start her on a basic HEP for general exercise   Continue abdominal and core strengthening     Subjective:     Pain Ratin-3 mostly            Things are going ok. She is still never without the pain in her abdomen. The pain has lessened. It doesn't get as bad as it used to get.          Objective:     Barriers to Learning or Achieving Goals: Chronicity of the problem.       Visc, LV fascial tensions.       Treatment Today     TREATMENT MINUTES COMMENTS   Evaluation     Self-care/ Home management     Manual therapy 55 MFR: ascending/descending colon, mesenteric root  Fascial release  using Strain-Counterstrain of B lumbopelvic/abdominal-LV   Neuromuscular Re-education     Therapeutic Activity     Therapeutic Exercises     Gait training     Modality__________________                Total 55    Blank areas are intentional and mean the treatment did not include these items.       Bennie Hernandez, PT  12/28/2018

## 2021-06-22 NOTE — PROGRESS NOTES
[facility] Daily Progress     Patient Name: Anais Kelly  Date: 2018  Visit #: 11  Referral Diagnosis: Abdominal pain, epigastric  Referring provider: Nimesh Lind DO  Visit Diagnosis:     ICD-10-CM    1. Generalized abdominal pain R10.84    2. Myofascial pain M79.18          Assessment:     Patient is benefitting from skilled physical therapy and is making steady progress toward functional goals.  Patient is appropriate to continue with skilled physical therapy intervention, as indicated by initial plan of care.  She was sore post treatment but did have improvement in mobility of her abdominal fascia with treatment.     Goal Status:  Pt. will demonstrate/verbalize independence in self-management of condition in : 12 weeks  Pt. will report decreased intensity, frequency of : Pain;in 6 weeks;Comment  Comment:: decrease pain from 0-8/10 to 0-4/10 with ADLs.   Pt. will be able to walk : 20 minutes;on even surfaces;with less pain;with less difficulty;for household mobility;for community mobility;for exercise/recreation;in 12 weeks  Patient will sit: 10 minutes;for driving;for watching TV;with less difficultty;with no pain;in 12 weeks;Comment  Comment: without having to slouch in her chair        Plan / Patient Education:     initiate SCS/MT and progress ex as tolerated.   Start her on a basic HEP for general exercise   Continue abdominal and core strengthening     Subjective:     Pain Ratin-8 pain range.     She has been doing ok. She did have a bout of the 24 hour stomach flu. This really caused to get rid of everything in her stomach.        Objective:     Barriers to Learning or Achieving Goals: Chronicity of the problem.       visc fascial tensions.       Treatment Today     TREATMENT MINUTES COMMENTS   Evaluation     Self-care/ Home management     Manual therapy 55 MFR: ascending/descending colon, mesenteric root, sigmoid, MUL, L broad ligament   Neuromuscular Re-education      Therapeutic Activity     Therapeutic Exercises     Gait training     Modality__________________                Total 55    Blank areas are intentional and mean the treatment did not include these items.       Bennie Hernandez, PT  12/18/2018

## 2021-06-22 NOTE — PROGRESS NOTES
[facility] Daily Progress     Patient Name: Anais Kelly  Date: 2018  Visit #: 8  Referral Diagnosis: Abdominal pain, epigastric  Referring provider: Nimesh Lind DO  Visit Diagnosis:     ICD-10-CM    1. Generalized abdominal pain R10.84    2. Myofascial pain M79.18          Assessment:     Patient is benefitting from skilled physical therapy and is making steady progress toward functional goals.  Patient is appropriate to continue with skilled physical therapy intervention, as indicated by initial plan of care.  She did feel improvement post treatment today in her leg. In treating her epidurals it should also help with her abdominal pain.     Goal Status:  Pt. will demonstrate/verbalize independence in self-management of condition in : 12 weeks  Pt. will report decreased intensity, frequency of : Pain;in 6 weeks;Comment  Comment:: decrease pain from 0-8/10 to 0-4/10 with ADLs.   Pt. will be able to walk : 20 minutes;on even surfaces;with less pain;with less difficulty;for household mobility;for community mobility;for exercise/recreation;in 12 weeks  Patient will sit: 10 minutes;for driving;for watching TV;with less difficultty;with no pain;in 12 weeks;Comment  Comment: without having to slouch in her chair    No Data Recorded    Plan / Patient Education:     initiate SCS/MT and progress ex as tolerated.   Start her on a basic HEP for general exercise   Continue abdominal and core strengthening     Subjective:     Pain Ratin-8 pain range.     She went to [facility] for her [facility]. The L knee does need replacement. She did get rooster comb injections in both knees. The R knee is better but the L knee isn't much better (comes and goes). They did order some PT for this.     Sometimes the abdomen is better and sometimes it isn't. This morning it isn't doing that good and doesn't really know why.     She does feel like it is getting better with how long the pain will last in her abdomen.      With  regards to her hip she hasn't been feeling much different since starting PT.       Objective:     Barriers to Learning or Achieving Goals: Chronicity of the problem.       LV fascial tensions.       Treatment Today     TREATMENT MINUTES COMMENTS   Evaluation     Self-care/ Home management     Manual therapy 55 Fascial release using Strain-Counterstrain of B lumbar EPI-LV, LLE/lumbopelvic/post axil row-LV   Neuromuscular Re-education     Therapeutic Activity     Therapeutic Exercises     Gait training     Modality__________________                Total 55    Blank areas are intentional and mean the treatment did not include these items.       Bennie Hernandez, PT  11/30/2018

## 2021-06-23 NOTE — PROGRESS NOTES
[facility] Daily Progress     Patient Name: Anais Kelly  Date: 2019  Visit #: 13  Referral Diagnosis: Abdominal pain, epigastric  Referring provider: Nimesh Lind DO  Visit Diagnosis:     ICD-10-CM    1. Generalized abdominal pain R10.84    2. Myofascial pain M79.18          Assessment:     Patient is benefitting from skilled physical therapy and is making steady progress toward functional goals.  Patient is appropriate to continue with skilled physical therapy intervention, as indicated by initial plan of care.  She was tight in her colon and did feel improvement post treatment.    Goal Status:  Pt. will demonstrate/verbalize independence in self-management of condition in : 12 weeks  Pt. will report decreased intensity, frequency of : Pain;in 6 weeks;Comment  Comment:: decrease pain from 0-8/10 to 0-4/10 with ADLs.   Pt. will be able to walk : 20 minutes;on even surfaces;with less pain;with less difficulty;for household mobility;for community mobility;for exercise/recreation;in 12 weeks  Patient will sit: 10 minutes;for driving;for watching TV;with less difficultty;with no pain;in 12 weeks;Comment  Comment: without having to slouch in her chair        Plan / Patient Education:     initiate SCS/MT and progress ex as tolerated.   Start her on a basic HEP for general exercise   Continue abdominal and core strengthening     Subjective:     Pain Ratin-3 mostly            She has been out of town and just returned. She really did notice that she wasn't treated while being gone.   She had some     Objective:     Barriers to Learning or Achieving Goals: Chronicity of the problem.       Visc, LV fascial tensions.       Treatment Today     TREATMENT MINUTES COMMENTS   Evaluation     Self-care/ Home management     Manual therapy 55 MFR: ascending/descending colon, mesenteric root  Fascial release using Strain-Counterstrain of B lumbopelvic/abdominal-LV   Neuromuscular Re-education      Therapeutic Activity     Therapeutic Exercises     Gait training     Modality__________________                Total 55    Blank areas are intentional and mean the treatment did not include these items.       Bennie Hernandez, PT  2/5/2019

## 2021-06-24 NOTE — PROGRESS NOTES
[facility] Daily Progress     Patient Name: Anais Kelly  Date: 3/12/2019  Visit #: 16  Referral Diagnosis: Abdominal pain, epigastric  Referring provider: Nimesh Lind DO  Visit Diagnosis:     ICD-10-CM    1. Generalized abdominal pain R10.84    2. Myofascial pain M79.18          Assessment:     Patient is benefitting from skilled physical therapy and is making steady progress toward functional goals.  Patient is appropriate to continue with skilled physical therapy intervention, as indicated by initial plan of care.  There was good release of arterial fascial tensions which should improve her pain as well as motility of her abdomen.     Goal Status: - PROGRESSING TOWARDS ALL GOALS  Pt. will demonstrate/verbalize independence in self-management of condition in : 12 weeks  Pt. will report decreased intensity, frequency of : Pain;in 6 weeks;Comment  Comment:: decrease pain from 0-8/10 to 0-4/10 with ADLs.   Pt. will be able to walk : 20 minutes;on even surfaces;with less pain;with less difficulty;for household mobility;for community mobility;for exercise/recreation;in 12 weeks  Patient will sit: 10 minutes;for driving;for watching TV;with less difficultty;with no pain;in 12 weeks;Comment  Comment: without having to slouch in her chair        Plan / Patient Education:     initiate SCS/MT and progress ex as tolerated.   Start her on a basic HEP for general exercise   Continue abdominal and core strengthening     Subjective:     Pain Ratin-3 mostly         It is still at least a few days that she does feel better. Overall it does still bother her.       Objective:     Barriers to Learning or Achieving Goals: Chronicity of the problem.       Visc, art, neural fascial tensions.       Treatment Today     TREATMENT MINUTES COMMENTS   Evaluation     Self-care/ Home management     Manual therapy 55 MFR: cecum, sigmoid, descending colon  Fascial release using Strain-Counterstrain of B abdominal-Art, L  lower abd-V   Neuromuscular Re-education     Therapeutic Activity     Therapeutic Exercises     Gait training     Modality__________________                Total 55    Blank areas are intentional and mean the treatment did not include these items.       Bennie Hernandez, PT  3/12/2019

## 2021-06-24 NOTE — PROGRESS NOTES
[facility] Daily Progress     Patient Name: Anais Kelly  Date: 2019  Visit #: 15  Referral Diagnosis: Abdominal pain, epigastric  Referring provider: Nimesh Lind DO  Visit Diagnosis:     ICD-10-CM    1. Generalized abdominal pain R10.84    2. Myofascial pain M79.18          Assessment:     Patient is benefitting from skilled physical therapy and is making steady progress toward functional goals.  Patient is appropriate to continue with skilled physical therapy intervention, as indicated by initial plan of care.  She did have some increased soreness right after treatment which did subside a little after standing. There was a few more areas of restriction today which were more pronounced (R of umbilicus and over cecum region). These did improve with light induction treatment.     Goal Status: - PROGRESSING TOWARDS ALL GOALS  Pt. will demonstrate/verbalize independence in self-management of condition in : 12 weeks  Pt. will report decreased intensity, frequency of : Pain;in 6 weeks;Comment  Comment:: decrease pain from 0-8/10 to 0-4/10 with ADLs.   Pt. will be able to walk : 20 minutes;on even surfaces;with less pain;with less difficulty;for household mobility;for community mobility;for exercise/recreation;in 12 weeks  Patient will sit: 10 minutes;for driving;for watching TV;with less difficultty;with no pain;in 12 weeks;Comment  Comment: without having to slouch in her chair        Plan / Patient Education:     initiate SCS/MT and progress ex as tolerated.   Start her on a basic HEP for general exercise   Continue abdominal and core strengthening     Subjective:     Pain Ratin-3 mostly         She felt good for a few days after treatments. She does do much better after having treatments with her fucntion and overall pain levels.      Objective:     Barriers to Learning or Achieving Goals: Chronicity of the problem.       Visc, art, neural fascial tensions.       Treatment Today      TREATMENT MINUTES COMMENTS   Evaluation     Self-care/ Home management     Manual therapy 55 MFR: cecum, IVC to EIL bilaterally  Fascial release using Strain-Counterstrain of lower abd vagus-N, upper digestive-V, PINT-A   Neuromuscular Re-education 15 Recip inhib of art, visc, neural fascia   Therapeutic Activity     Therapeutic Exercises     Gait training     Modality__________________                Total 70    Blank areas are intentional and mean the treatment did not include these items.       Bennie Hernandez, PT  2/19/2019

## 2021-06-24 NOTE — PROGRESS NOTES
[facility] Certification Request    February 12, 2019      Patient: Anais Kelly  MR Number: [id number]  YOB: 1950  Date of Visit: 2/12/2019      Dear Dr. Lind, Nimesh BARRAGAN, DO:    Thank you for this referral.   We are seeing Anais Kelly for Physical Therapy of abdominal pain, myofascial pain.     Medicare and/or Medicaid requires physician review and approval of the treatment plan. Please review the plan of care and verify that you agree with the therapy plan of care by co-signing this note.      Plan of Care  Authorization / Certification Start Date: 01/01/19  Authorization / Certification End Date: 04/01/19  Communication with: Referral Source  Patient Related Instruction: Nature of Condition;Treatment plan and rationale;Self Care instruction;Basis of treatment;Body mechanics;Posture;Precautions;Next steps;Expected outcome  Times per Week: 1  Number of Weeks: 12  Number of Visits: 12  Discharge Planning: discharge to [facility] once goals are met  Therapeutic Exercise: ROM;Stretching;Strengthening  Neuromuscular Reeducation: kinesio tape;posture;balance/proprioception;TNE;core  Manual Therapy: soft tissue mobilization;myofascial release;joint mobilization;muscle energy;strain counterstrain      Goal Status: - PROGRESSING TOWARDS ALL GOALS  Pt. will demonstrate/verbalize independence in self-management of condition in : 12 weeks  Pt. will report decreased intensity, frequency of : Pain;in 6 weeks;Comment  Comment:: decrease pain from 0-8/10 to 0-4/10 with ADLs.   Pt. will be able to walk : 20 minutes;on even surfaces;with less pain;with less difficulty;for household mobility;for community mobility;for exercise/recreation;in 12 weeks  Patient will sit: 10 minutes;for driving;for watching TV;with less difficultty;with no pain;in 12 weeks;Comment  Comment: without having to slouch in her chair          If you have any questions or concerns, please don't hesitate to  call.    Sincerely,      Bennie Hernandez PT, ATC        Physician recommendation:     ___ Follow therapist's recommendation        ___ Modify therapy      *Physician co-signature indicates they certify the need for these services furnished within this plan and while under their care.         [facility] Daily Progress     Patient Name: Anais Kelly  Date: 2019  Visit #: 14  Referral Diagnosis: Abdominal pain, epigastric  Referring provider: Nimesh Lind DO  Visit Diagnosis:     ICD-10-CM    1. Generalized abdominal pain R10.84    2. Myofascial pain M79.18          Assessment:     Patient is benefitting from skilled physical therapy and is making steady progress toward functional goals.  Patient is appropriate to continue with skilled physical therapy intervention, as indicated by initial plan of care.  She was still quite tender in ascending colon. It did slightly improve with treatment. Overall she is making good progress but with being out of town for a month some of her sx did return. Despite there being pain she is more functional than she was at evaluation.     Goal Status: - PROGRESSING TOWARDS ALL GOALS  Pt. will demonstrate/verbalize independence in self-management of condition in : 12 weeks  Pt. will report decreased intensity, frequency of : Pain;in 6 weeks;Comment  Comment:: decrease pain from 0-8/10 to 0-4/10 with ADLs.   Pt. will be able to walk : 20 minutes;on even surfaces;with less pain;with less difficulty;for household mobility;for community mobility;for exercise/recreation;in 12 weeks  Patient will sit: 10 minutes;for driving;for watching TV;with less difficultty;with no pain;in 12 weeks;Comment  Comment: without having to slouch in her chair        Plan / Patient Education:     initiate SCS/MT and progress ex as tolerated.   Start her on a basic HEP for general exercise   Continue abdominal and core strengthening     Subjective:     Pain Ratin-3 mostly         It  was ok after the last Rx.   She did get a sharp pain in the lower R abdomen (usually L side) when out shopping. It is sore and feels like it is inflamed. It could be all stress related.     Objective:     Barriers to Learning or Achieving Goals: Chronicity of the problem.       Visc fascial tensions.       Treatment Today     TREATMENT MINUTES COMMENTS   Evaluation     Self-care/ Home management     Manual therapy 55 MFR: ascending/descending colon, mesenteric root, L3 to jejunum  Fascial release using Strain-Counterstrain of B sphincters-V   Neuromuscular Re-education     Therapeutic Activity     Therapeutic Exercises     Gait training     Modality__________________                Total 55    Blank areas are intentional and mean the treatment did not include these items.       Bennie Hernandez, PT  2/12/2019

## 2021-06-25 NOTE — PROGRESS NOTES
Anais Kelly was seen today for management of osteoporosis.  The last bone density scan was done on 2/24/20:  1. The spine bone density L1-L4 with T-score -0.6.  2. Femoral bone densities show left total hip T- score -2.1 and right total hip T-score -2.6.  3. Trabecular bone score indicates poor trabecular bone architecture.    Patient was never treated in the past with osteoporosis medications. She is on Premarin since age 34 when had TH&KALIN for severe endometriosis.    Social history:  reports that she has never smoked. She has never used smokeless tobacco. She reports current alcohol use. She reports that she does not use drugs.    Past medical history:   Patient Active Problem List   Diagnosis     Irritable bowel syndrome with diarrhea     Benign essential hypertension     Insomnia     Small fiber neuropathy (H); diagnosed by neurology     Abdominal pain, chronic, bilateral lower quadrant     Hormone replacement therapy, postmenopausal     Ischemia of finger     Age-related osteoporosis without current pathological fracture     Ascending aorta dilatation (H)     Coronary artery calcification seen on CAT scan     Hyperlipidemia LDL goal <70     History of fractures: none  FH: mother had MS, no OP history     Family History   Problem Relation Age of Onset     Cancer Father 72        Bladder Cancer     Multiple sclerosis Mother      No Medical Problems Brother        Diet and supplements: Citracal 2 pills two times a day, MVI, 2 dairy servings, 5000 international units vit D    Risk medications: PPI for GERD daily      Laboratory testing:   Component      Latest Ref Rng & Units 2/10/2021   Vitamin D, Total (25-Hydroxy)      30.0 - 80.0 ng/mL 50.7   Vitamin B-12      213 - 816 pg/mL 858 (H)     Component      Latest Ref Rng & Units 2/10/2021   Sodium      136 - 145 mmol/L 142   Potassium      3.5 - 5.0 mmol/L 4.2   Chloride      98 - 107 mmol/L 106   CO2      22 - 31 mmol/L 29   Anion Gap, Calculation      5 -  "18 mmol/L 7   Glucose      70 - 125 mg/dL 92   BUN      8 - 28 mg/dL 16   Creatinine      0.60 - 1.10 mg/dL 0.75   GFR MDRD Af Amer      >60 mL/min/1.73m2 >60   GFR MDRD Non Af Amer      >60 mL/min/1.73m2 >60   Bilirubin, Total      0.0 - 1.0 mg/dL 0.6   Calcium      8.5 - 10.5 mg/dL 10.3   Protein, Total      6.0 - 8.0 g/dL 6.8   ALBUMIN      3.5 - 5.0 g/dL 4.0   Alkaline Phosphatase      45 - 120 U/L 105   AST      0 - 40 U/L 15   ALT      0 - 45 U/L 11   WBC      4.0 - 11.0 thou/uL 6.5   RBC      3.80 - 5.40 mill/uL 4.49   Hemoglobin      12.0 - 16.0 g/dL 14.1   Hematocrit      35.0 - 47.0 % 43.2   MCV      80 - 100 fL 96   MCH      27.0 - 34.0 pg 31.4   MCHC      32.0 - 36.0 g/dL 32.6   RDW      11.0 - 14.5 % 12.6   Platelets      140 - 440 thou/uL 242   MPV      7.0 - 10.0 fL 8.9     ROS:     Constitutional: Negative.    HENT: Negative.    Eyes: Negative.    Respiratory: Negative.    Cardiovascular: Negative.    Gastrointestinal: Negative.    Endocrine: Negative.    Genitourinary: Negative.    Musculoskeletal: Negative.    Skin: Negative.    Allergic/Immunologic: Negative.    Neurological: Negative.    Hematological: Negative.    Psychiatric/Behavioral: Negative.      PE:  /80 (Patient Site: Right Arm, Patient Position: Sitting, Cuff Size: Adult Regular)   Pulse 94   Ht 5' 6.54\" (1.69 m)   Wt 153 lb 14.4 oz (69.8 kg)   LMP 07/10/1984   SpO2 98%   BMI 24.44 kg/m    Constitutional:  oriented to person, place, and time, appears well-nourished. No distress.           Impression:   1. Age-related osteoporosis without current pathological fracture  Thyroid Stimulating Hormone (TSH)    Parathyroid Hormone Intact with Minerals    Celiac(Gluten)Antibody Panel    Electrophoresis, Protein, Serum, Cascade    Vitamin D, Total (25-Hydroxy)    Calcium, 24 Hour Urine    alendronate (FOSAMAX) 70 MG tablet   2. Abnormal findings on diagnostic imaging of other specified body structures   Thyroid Stimulating Hormone " (TSH)       Plan:  1. The patient will take 1200 - 1500 mg of calcium daily from the diet and supplements.she is currently taking [id number] daily and will need to reduce calcium intake.  2. The patient will take 5000 IU of vit D daily.  3. Treatment options discussed with the patient and all printed info provided. We will try Fosamax. She has h/o GERD but no gastric bypass surgery or esophageal strictures. We will see if she can tolerate this medication.  4. I am asking for follow up in 4 weeks to see how she is doing on Fosamax. She also wants to transfer primary care to me so we will do establish care visit next time .    Patient Instructions   Bring 24 h urine for calcium in 2-3 weeks after you reduce your calcium intake.    Treatment Options discussed:   Bisphosphonates  Oral - Alendronate (Fosamax) once weekly for 2-5 years  IV - Zoledronic acid (Reclast) once a year for 3 years total     -Discussed that studies have shown that alendronate for 5 years will protect the bones for an additional 5 years nearly as much as being on alendronate for 10 years straight (only a slight increase in asymptomatic vertebral fractures, no increase in symptomatic fractures).     -discussed studies have shown that three years of zolendronic acid protects for the following 3 years as much as being on zolendronic acid for 6 years straight      GI side effects of the oral bisphosphonates include reflux, esophagitis (inflammation of the esophagus) and ulcers. You should take the medication first thing in the morning, on an empty stomach with a full glass of water and wait 60 minutes to eat.     Flu-like symptoms can occur within the first 24-72 hours of your first infusion of the IV bisphosphonate. This includes low-grade fever, muscle and joint pains. Ibuprofen and/or Tylenol can help these symptoms. Low calcium levels can also occur with the IV bisphosphonates.     Osteonecrosis of the jaw (ONJ) has been rarely associated with  bisphosphonate use for osteoporosis.The risk is approximately 1/1700-1/100,000, with development most likely related to invasive dental procedures (extractions, dental implants) and poor dental hygiene. Be sure to continue regular dental visits and alert me and/or your dentist for any issues. If you do require invasive dental work, please contact me and we will stop the medication 3 months prior.      -The data available at this time suggests that there is probably a small increase risk of atypical (nontraumatic) subtrochanteric fractures of the femur in patients on bisphosphonate therapy compared to those not on it. One large study suggested that for every 100 fractures prevented with bisphosphonate therapy, less than one femur fracture will occur. Other studies suggest one episode per 2,500 patient years. Patient should call with leg pain.     -Calcium: total calcium intake should be 1200mg per day from dietary sources. If not achieved with diet alone, add in calcium tablet(s). No more than 500mg at one time. Dietary sources: 8 ounces of milk, 4 ounces of yogurt or 1 ounce of cheese contain about 300mg calcium per serving, green veggies, almonds, beans, oranges, along with various other plant sources.     -Vitamin D3 recommendations: 2000 IU daily.    -Bone Stimulating Exercise: impact and weight-bearing exercise like walking, jogging, yoga, Madi-Chi, stair-climbing, dancing, dry aerobics, and tennis 3-5 times per week for at least 30 minutes & weight-lifting or resistance training 2 times per week, may improve your bone desity and increase your strength, thereby decreasing your risk of fracture.    -Dr. Lara Hope has a book on Yoga for Osteoporosis and a 12 pose regimen you can look up online.     -Fall Prevention: avoiding ice, use of [employer] http://www.[employer]/ to cover shoes in the winter, avoiding loose gravel and uneven terrain, clearing house of cords, throw rugs, avoiding ladders, using caution  with stairs and around dogs and small children.         Thank you for the opportunity to participate in the care of your patient. If you have any additional questions, do not hesitate to contact me at [facility].    This note has been dictated using voice recognition software. Any grammatical or context distortions are unintentional and inherent to the software      With best personal regards,   Emilee Lemus MD, CCD  5/26/2021

## 2021-06-25 NOTE — PROGRESS NOTES
[facility] Daily Progress     Patient Name: Anais Kelly  Date: 3/19/2019  Visit #: 17  Referral Diagnosis: Abdominal pain, epigastric  Referring provider: Nimesh Lind DO  Visit Diagnosis:     ICD-10-CM    1. Generalized abdominal pain R10.84    2. Myofascial pain M79.18          Assessment:     Patient is benefitting from skilled physical therapy and is making steady progress toward functional goals.  Patient is appropriate to continue with skilled physical therapy intervention, as indicated by initial plan of care.  She was a little sore after treatment but this should help to improve her LE and abdominal pains.     Goal Status: - PROGRESSING TOWARDS ALL GOALS  Pt. will demonstrate/verbalize independence in self-management of condition in : 12 weeks  Pt. will report decreased intensity, frequency of : Pain;in 6 weeks;Comment  Comment:: decrease pain from 0-8/10 to 0-4/10 with ADLs.   Pt. will be able to walk : 20 minutes;on even surfaces;with less pain;with less difficulty;for household mobility;for community mobility;for exercise/recreation;in 12 weeks  Patient will sit: 10 minutes;for driving;for watching TV;with less difficultty;with no pain;in 12 weeks;Comment  Comment: without having to slouch in her chair        Plan / Patient Education:     initiate SCS/MT and progress ex as tolerated.   Start her on a basic HEP for general exercise   Continue abdominal and core strengthening     Subjective:     Pain Ratin-3 mostly         Overall she does feel about the same.    There are some sharp pains in her lower abdomen. It is magnified by stress but she hasn't found that food affects it that much.        Objective:     Barriers to Learning or Achieving Goals: Chronicity of the problem.       LV, art, neural fascial tensions.       Treatment Today     TREATMENT MINUTES COMMENTS   Evaluation     Self-care/ Home management     Manual therapy 55 Fascial release using Strain-Counterstrain of B  abd pre-gang, B lumbar/lower thor post-gang-N, B abd-LV, CIL-A R, B CVF-LV   Neuromuscular Re-education     Therapeutic Activity     Therapeutic Exercises     Gait training     Modality__________________                Total 55    Blank areas are intentional and mean the treatment did not include these items.       Bennie Hernandez, PT  3/19/2019

## 2021-06-25 NOTE — TELEPHONE ENCOUNTER
Refill Approved    Rx renewed per Medication Renewal Policy. Medication was last renewed on 3/19/20.    Akash Salinas, [facility] Triage/Med Refill 6/8/2021     Requested Prescriptions   Pending Prescriptions Disp Refills     PREMARIN 0.625 mg tablet [Pharmacy Med Name: Premarin 0.625 MG Oral Tablet] 90 tablet 0     Sig: Take 1 tablet by mouth once daily       Hormone Replacement Therapy Refill Protocol Passed - 6/7/2021 10:04 AM        Passed - PCP or prescribing provider visit in past 12 months       Last office visit with prescriber/PCP: 9/2/2020 Lopez Gordon MD OR same dept: 5/26/2021 Emilee Lemus MD OR same specialty: 5/26/2021 Emilee Lemus MD  Last physical: 2/10/2021 Last MTM visit: Visit date not found     Next visit within 3 mo: Visit date not found  Next physical within 3 mo: Visit date not found  Prescriber OR PCP: Lopez Gordon MD  Last diagnosis associated with med order: 1. Hormone replacement therapy, postmenopausal  - PREMARIN 0.625 mg tablet [Pharmacy Med Name: Premarin 0.625 MG Oral Tablet]; Take 1 tablet by mouth once daily  Dispense: 90 tablet; Refill: 0     If protocol passes may refill for 3 months.

## 2021-06-26 NOTE — PATIENT INSTRUCTIONS - HE
Bring 24 h urine for calcium in 2-3 weeks after you reduce your calcium intake.    Treatment Options discussed:   Bisphosphonates  Oral - Alendronate (Fosamax) once weekly for 2-5 years  IV - Zoledronic acid (Reclast) once a year for 3 years total     -Discussed that studies have shown that alendronate for 5 years will protect the bones for an additional 5 years nearly as much as being on alendronate for 10 years straight (only a slight increase in asymptomatic vertebral fractures, no increase in symptomatic fractures).     -discussed studies have shown that three years of zolendronic acid protects for the following 3 years as much as being on zolendronic acid for 6 years straight      GI side effects of the oral bisphosphonates include reflux, esophagitis (inflammation of the esophagus) and ulcers. You should take the medication first thing in the morning, on an empty stomach with a full glass of water and wait 60 minutes to eat.     Flu-like symptoms can occur within the first 24-72 hours of your first infusion of the IV bisphosphonate. This includes low-grade fever, muscle and joint pains. Ibuprofen and/or Tylenol can help these symptoms. Low calcium levels can also occur with the IV bisphosphonates.     Osteonecrosis of the jaw (ONJ) has been rarely associated with bisphosphonate use for osteoporosis.The risk is approximately 1/1700-1/100,000, with development most likely related to invasive dental procedures (extractions, dental implants) and poor dental hygiene. Be sure to continue regular dental visits and alert me and/or your dentist for any issues. If you do require invasive dental work, please contact me and we will stop the medication 3 months prior.      -The data available at this time suggests that there is probably a small increase risk of atypical (nontraumatic) subtrochanteric fractures of the femur in patients on bisphosphonate therapy compared to those not on it. One large study suggested that for  every 100 fractures prevented with bisphosphonate therapy, less than one femur fracture will occur. Other studies suggest one episode per 2,500 patient years. Patient should call with leg pain.     -Calcium: total calcium intake should be 1200mg per day from dietary sources. If not achieved with diet alone, add in calcium tablet(s). No more than 500mg at one time. Dietary sources: 8 ounces of milk, 4 ounces of yogurt or 1 ounce of cheese contain about 300mg calcium per serving, green veggies, almonds, beans, oranges, along with various other plant sources.     -Vitamin D3 recommendations: 2000 IU daily.    -Bone Stimulating Exercise: impact and weight-bearing exercise like walking, jogging, yoga, Madi-Chi, stair-climbing, dancing, dry aerobics, and tennis 3-5 times per week for at least 30 minutes & weight-lifting or resistance training 2 times per week, may improve your bone desity and increase your strength, thereby decreasing your risk of fracture.    -Dr. Lara Hope has a book on Yoga for Osteoporosis and a 12 pose regimen you can look up online.     -Fall Prevention: avoiding ice, use of [employer] http://www[employer]/ to cover shoes in the winter, avoiding loose gravel and uneven terrain, clearing house of cords, throw rugs, avoiding ladders, using caution with stairs and around dogs and small children.

## 2021-06-26 NOTE — PROGRESS NOTES
[facility] Daily Progress     Patient Name: Anais Kelly  Date: 2021  Visit #: 9    PTA visit #:     Referral Diagnosis: LBP   Referring provider: Lopez Gordon MD  Visit Diagnosis:     ICD-10-CM    1. Chronic bilateral low back pain without sciatica  M54.5     G89.29    2. Pain in joint involving right ankle and foot  M25.571    3. Chronic pain of both knees  M25.561     M25.562     G89.29    4. Myofascial pain  M79.18           Assessment:     Patient is benefitting from skilled physical therapy and is making steady progress toward functional goals.  Patient is appropriate to continue with skilled physical therapy intervention, as indicated by initial plan of care.   The did have good release of fascial tensions treated today. She did have some soreness from lying during treatment but that does seem to be her normal and does move better once getting up and walking.         Goal Status:  Pt. will demonstrate/verbalize independence in self-management of condition in : 12 weeks  Pt. will report decreased intensity, frequency of : Pain;in 12 weeks  Pt. will be able to walk : 30 minutes;on even surfaces;with less difficulty;for household mobility;for community mobility;for exercise/recreation;in 12 weeks    Patient will decrease : TREVER score;by _ points;for improved quality of function;for improved quality of life;in 12 weeks  by ___ points: 10      Plan / Patient Education:     Continue with initial plan of care.      Subjective:     Pain Ratin-6 in the heel/knee .   The knee was better after the last treatment. In the last 4 days it was bad. The foot is feeling better.   She can walk ok but when lying down at night it really gives her a lot of pain.        Objective:     LV, art, neural fascial tensions.       Treatment Today   2021   TREATMENT MINUTES COMMENTS   Evaluation     Self-care/ Home management     Manual therapy 25 Fascial release using Strain-Counterstrain of BLE-N (scar on  R), RLE-LV, R knee-A   Neuromuscular Re-education 15 Recip inhib of LV, art, neural fascia   Therapeutic Activity     Therapeutic Exercises 15 AA/PROM to BLE, pelvis, L-spine   Gait training     Modality__________________                Total 55    Blank areas are intentional and mean the treatment did not include these items.       Bennie Hernandez  6/7/2021

## 2021-06-29 NOTE — PROGRESS NOTES
Progress Notes by Bennie Hernandez at 10/2/2020 10:00 AM     Author: Bennie Hernandez Service: -- Author Type: Physical Therapist    Filed: 10/2/2020  1:47 PM Encounter Date: 10/2/2020 Status: Attested    : Bennie Hernandez (Physical Therapist) Cosigner: Lopez Gordon MD at 10/2/2020  2:44 PM    Attestation signed by Lopez Gordon MD at 10/2/2020  2:44 PM    -                    [facility] Certification Request    October 2, 2020      Patient: Anais Kelly  MR Number: [id number]  YOB: 1950  Date of Visit: 10/2/2020      Dear Lopez Hall MD:    Thank you for this referral.   We are seeing Anais Kelly for Physical Therapy of LBP.    Medicare and/or Medicaid requires physician review and approval of the treatment plan. Please review the plan of care and verify that you agree with the therapy plan of care by co-signing this note.      Plan of Care  Authorization / Certification Start Date: 10/02/20  Authorization / Certification End Date: 01/01/21  Communication with: Referral Source  Patient Related Instruction: Nature of Condition;Treatment plan and rationale;Self Care instruction;Basis of treatment;Body mechanics;Posture;Precautions;Next steps;Expected outcome  Times per Week: 1  Number of Weeks: 12  Number of Visits: 12  Discharge Planning: discharge with [facility] once goals are met  Therapeutic Exercise: ROM;Stretching;Strengthening  Neuromuscular Reeducation: kinesio tape;posture;balance/proprioception;TNE;core  Manual Therapy: soft tissue mobilization;myofascial release;joint mobilization;muscle energy;strain counterstrain      Goals:  Pt. will demonstrate/verbalize independence in self-management of condition in : 12 weeks  Pt. will report decreased intensity, frequency of : Pain;in 12 weeks  Pt. will have improved quality of sleep: getting 75-90% of required amount;with less pain;in 12 weeks;Comment  Comment:: verbalize less pain when getting up in the  mornings.  Patient will stand : 60 minutes;with less pain;with less difficultty;for home chores;for work;in 12 weeks    Patient will decrease : TREVER score;by _ points;for improved quality of function;for improved quality of life;in 12 weeks  by ___ points: 10        If you have any questions or concerns, please don't hesitate to call.    Sincerely,      Bennie Hernandez PT, ATC        Physician recommendation:     ___ Follow therapist's recommendation        ___ Modify therapy      *Physician co-signature indicates they certify the need for these services furnished within this plan and while under their care.       [facility]   [facility] Initial Evaluation    Patient Name: Anais Kelly  Date of evaluation: 10/2/2020  Referral Diagnosis: lower back pain  Referring provider: Lopez Gordon MD  Visit Diagnosis:     ICD-10-CM    1. Chronic bilateral low back pain without sciatica  M54.5     G89.29    2. Myofascial pain  M79.18        Assessment:        Anais Kelly is a 70 y.o. female who presents to PT today with lower back pain. She is limited with standing, sitting, yard work, stairs and sleeping due to her pain. She does have fascial hypomobilities present which will contribute to her current symptoms. She is appropriate for skilled PT to allow her to reach all stated goals.     Goals:  Pt. will demonstrate/verbalize independence in self-management of condition in : 12 weeks  Pt. will report decreased intensity, frequency of : Pain;in 12 weeks  Pt. will have improved quality of sleep: getting 75-90% of required amount;with less pain;in 12 weeks;Comment  Comment:: verbalize less pain when getting up in the mornings.  Patient will stand : 60 minutes;with less pain;with less difficultty;for home chores;for work;in 12 weeks    Patient will decrease : TREVER score;by _ points;for improved quality of function;for improved quality of life;in 12 weeks  by ___ points: 10      Patient's expectations/goals  are realistic    Barriers to Learning or Achieving Goals:  Chronicity of the problem.       Plan / Patient Instructions:        Plan of Care:   Authorization / Certification Start Date: 10/02/20  Authorization / Certification End Date: 01/01/21  Communication with: Referral Source  Patient Related Instruction: Nature of Condition;Treatment plan and rationale;Self Care instruction;Basis of treatment;Body mechanics;Posture;Precautions;Next steps;Expected outcome  Times per Week: 1  Number of Weeks: 12  Number of Visits: 12  Discharge Planning: discharge with I HEP once goals are met  Therapeutic Exercise: ROM;Stretching;Strengthening  Neuromuscular Reeducation: kinesio tape;posture;balance/proprioception;TNE;core  Manual Therapy: soft tissue mobilization;myofascial release;joint mobilization;muscle energy;strain counterstrain      Plan for next visit: Plan to con't with manual therapy to decrease fascial tension/tone to normalize ROM/decrease inflammation/decrease mm tone and improve proprioception.  Progress HEP as tolerated.      Subjective:         Social information:   Occupation:retired   Work Status:NA    History of Present Illness:    Anais is a 70 y.o. female who presents to therapy today with complaints of lower back pain and IT band tightness. She feels like Covid precautions has really been straining on her. She feels like she is losing some core strength. She finds she is also slouching and hunching over more.   Does sleep on her sides with her knees flexed. She does not use anything in between her knees.   She does have abdominal pain as well from a history of multiple surgeries which has not responded to previous PT.   Her shoulders has been doing quite well for her since her last bout of PT prior to Covid. .   She did make a pledge to go to the gym prior to Covid but hasn't gone back to that idea.   Her knees are also quite painful for her. She has had injections in them in May and September. She is  doing better with these but she has been having some IT band tightness. She has had these injections for quite a bit of time.   Function: stairs/gardening will add some pain in her knees as well as her lower back, lifting does aggravate her symptoms in her lower back, feels like there is less stamina - walking about 30' or so, standing 30' or so is difficult, does not wake her from pain but wakes up with pain in the mornings, she finds that she is just not doing that much at all.   She describes their previous level of function as not limited       Pain Rating:3  Pain rating at best: 1   Pain rating at worst: 6-7 getting up out of bed  Pain description: aching, dull and pain    Patient reports no benefit from  hasn't really tried much at this time. she is does take Tylenol randomly.          Objective:      Note: Items left blank indicates the item was not performed or not indicated at the time of the evaluation.    Patient Outcome Measures :    Modified Oswestry Low Back Pain Disablity Questionnaire  in %: 10     Scores range from 0-100%, where a score of 0% represents minimal pain and maximal function. The minimal clinically important difference is a score reduction of 12%.    Examination  1. Chronic bilateral low back pain without sciatica     2. Myofascial pain       Precautions/Restrictions: history of multiple abdominal surgeries.   Involved side: Bilateral  Posture Observation:    Standing: level pelvis in standing.       Lumbar ROM:  10/2/2020   Date:      *Indicate scale AROM AROM AROM   Lumbar Flexion WFL     Lumbar Extension WFL      Right Left Right Left Right Left   Lumbar Sidebending         Lumbar Rotation         Thoracic Flexion      Thoracic Extension      Thoracic Sidebending         Thoracic Rotation           Lower Extremity Strength:   10/2/2020   Date:      LE strength/5 Right Left Right Left Right Left   Hip Flexion (L1-3) 4- 4-       Hip Extension (L5-S1)         Hip Abduction (L4-5)          Hip Adduction (L2-3)         Hip External Rotation         Hip Internal Rotation         Knee Extension (L3-4) 4 4       Knee Flexion 4 4       Ankle Dorsiflexion (L4-5) 4+ 4+       Great Toe Extension (L5) 4+ 4+       Ankle Plantar flexion (S1)         Abdominals          Palpation: Hypomobile fascia of LE's, abdomen, spine. Pain over B PSIS.     Lumbar Special Tests:   10/2/2020   Lumbar Special Tests Right Left SI Tests Right  Left   Quadrant test   SI Compression     Straight leg raise WFL WFL SI Distraction     Crossover response   POSH Test     Slump   Sacral Thrust     Sit-up test  FADIR     Trunk extensor endurance test  Resisted Abduction     Prone instability test  Other:     Pubic shotgun  Other:           LE Screen:   Hip IR: 20 B     Hip ER:  WFL B   Hip Flexion: WFL    Hip Extension:  /      Treatment Today   10/2/2020   TREATMENT MINUTES COMMENTS   Evaluation 25 low   Self-care/ Home management     Manual therapy 15 Fascial release using Strain-Counterstrain of B lumbar PLL-MS, B femur (P)-MS   Neuromuscular Re-education     Therapeutic Activity     Therapeutic Exercises 10 Ed on walking and getting moving more   Gait training     Modality__________________                Total 50    Blank areas are intentional and mean the treatment did not include these items.       PT Evaluation Code: (Please list factors)  Patient History/Comorbidities: 3+  Examination: 1-2  Clinical Presentation: stable  Clinical Decision Making: low    Patient History/  Comorbidities Examination  (body structures and functions, activity limitations, and/or participation restrictions) Clinical Presentation Clinical Decision Making (Complexity)   No documented Comorbidities or personal factors 1-2 Elements Stable and/or uncomplicated Low   1-2 documented comorbidities or personal factor 3 Elements Evolving clinical presentation with changing characteristics Moderate   3-4 documented comorbidities or personal factors 4 or more  Unstable and unpredictable High               Bennie Hernandez PT, ATC  10/2/2020  10:03 AM

## 2021-06-30 NOTE — PROGRESS NOTES
Progress Notes by Bennie Hernandez at 3/24/2021  2:00 PM     Author: Bennie Hernandez Service: -- Author Type: Physical Therapist    Filed: 3/24/2021  4:54 PM Encounter Date: 3/24/2021 Status: Attested    : Bennie Hernandez (Physical Therapist) Cosigner: Lopez Gordon MD at 3/25/2021  6:20 AM    Attestation signed by Lopez Gordon MD at 3/25/2021  6:20 AM    -                    [facility] Certification Request    March 24, 2021      Patient: Anais Kelly  MR Number: [id number]  YOB: 1950  Date of Visit: 3/24/2021      Dear Lopez Hall MD:    Thank you for this referral.   We are seeing Anais Kelly for Physical Therapy of LBP, B knee pain, R heel pain.    Medicare and/or Medicaid requires physician review and approval of the treatment plan. Please review the plan of care and verify that you agree with the therapy plan of care by co-signing this note.      Plan of Care  Authorization / Certification Start Date: 03/24/21  Authorization / Certification End Date: 06/23/21  Communication with: Referral Source  Patient Related Instruction: Nature of Condition;Self Care instruction;Treatment plan and rationale;Basis of treatment;Body mechanics;Posture;Precautions;Next steps;Expected outcome  Times per Week: 1  Number of Weeks: 12  Number of Visits: 12  Discharge Planning: discharge to [facility] once goals are met  Therapeutic Exercise: ROM;Stretching;Strengthening  Neuromuscular Reeducation: kinesio tape;posture;balance/proprioception;TNE;core  Manual Therapy: myofascial release;soft tissue mobilization;joint mobilization;muscle energy;strain counterstrain      Goals:  Pt. will demonstrate/verbalize independence in self-management of condition in : 12 weeks  Pt. will report decreased intensity, frequency of : Pain;in 12 weeks  Pt. will be able to walk : 30 minutes;on even surfaces;with less difficulty;for household mobility;for community mobility;for  exercise/recreation;in 12 weeks    Patient will decrease : TREVER score;by _ points;for improved quality of function;for improved quality of life;in 12 weeks  by ___ points: 10        If you have any questions or concerns, please don't hesitate to call.    Sincerely,      Bennie Hernandez PT, ATC        Physician recommendation:     ___ Follow therapist's recommendation        ___ Modify therapy      *Physician co-signature indicates they certify the need for these services furnished within this plan and while under their care.       [facility]   [facility] Initial Evaluation    Patient Name: Anais Kelly  Date of evaluation: 3/24/2021  Referral Diagnosis: LBP  Referring provider: Lopez Gordon MD  Visit Diagnosis:     ICD-10-CM    1. Chronic bilateral low back pain without sciatica  M54.5     G89.29    2. Pain in joint involving right ankle and foot  M25.571    3. Chronic pain of both knees  M25.561     M25.562     G89.29    4. Myofascial pain  M79.18        Assessment:        Anais Kelly is a 71 y.o. female who presents to PT today with LBP, B knee pain and R heel pain. The pain in her heel is in the lateral calcaneus and not in her plantar fascia which may be more of a fat pad irritation vs. Plantar fasciitis. She does have some RLE radicular symptoms as well which may be related to her lower back pain but she may also have more IT band issues for that. She is limited with standing, walking, sitting and mobiltiy due to her symptoms. She does have fascial hypomobilities present which will contribute to her current symptoms. She is appropriate for skilled PT to allow her to reach all stated goals.     Goals:  Pt. will demonstrate/verbalize independence in self-management of condition in : 12 weeks  Pt. will report decreased intensity, frequency of : Pain;in 12 weeks  Pt. will be able to walk : 30 minutes;on even surfaces;with less difficulty;for household mobility;for community mobility;for  exercise/recreation;in 12 weeks    Patient will decrease : TREVER score;by _ points;for improved quality of function;for improved quality of life;in 12 weeks  by ___ points: 10      Patient's expectations/goals are realistic    Barriers to Learning or Achieving Goals:  Chronicity of the problem.       Plan / Patient Instructions:        Plan of Care:   Authorization / Certification Start Date: 03/24/21  Authorization / Certification End Date: 06/23/21  Communication with: Referral Source  Patient Related Instruction: Nature of Condition;Self Care instruction;Treatment plan and rationale;Basis of treatment;Body mechanics;Posture;Precautions;Next steps;Expected outcome  Times per Week: 1  Number of Weeks: 12  Number of Visits: 12  Discharge Planning: discharge to [facility] once goals are met  Therapeutic Exercise: ROM;Stretching;Strengthening  Neuromuscular Reeducation: kinesio tape;posture;balance/proprioception;TNE;core  Manual Therapy: myofascial release;soft tissue mobilization;joint mobilization;muscle energy;strain counterstrain      Plan for next visit: Plan to con't with manual therapy to decrease fascial tension/tone to normalize ROM/decrease inflammation/decrease mm tone and improve proprioception.       Subjective:         Social information:   Occupation:retired   Work Status:NA    History of Present Illness:    Anais is a 71 y.o. female who presents to therapy today with complaints of LBP. She feels like she did a log of sitting/hunching which has intensitied her LBP from being home from Covid restrictions.   She does have bad knees as well and does get cortisone injections every 4 months. She does feel like it alters her walking.    Down the R leg is sore from walking due to her poor knees and does have some plantar fascial issues in the R foot on the outside of the heel.   Function: sitting can give more pain depending on the chair, standing can give more pain, walking can give more trouble as well, up/down  stairs are painful, has had anxiety from Covid so she is not sleeping that well.   She describes their previous level of function as limited with same for quite some time    Pain Ratin     Pain rating at best: 2    Pain rating at worst: 7               Objective:      Note: Items left blank indicates the item was not performed or not indicated at the time of the evaluation.    Patient Outcome Measures :    Modified Oswestry Low Back Pain Disablity Questionnaire  in %: 14     Scores range from 0-100%, where a score of 0% represents minimal pain and maximal function. The minimal clinically important difference is a score reduction of 12%.    Examination  1. Chronic bilateral low back pain without sciatica     2. Pain in joint involving right ankle and foot     3. Chronic pain of both knees     4. Myofascial pain       Precautions/Restrictions: None  Involved side: Bilateral  Posture Observation:    Standing:   Seated:    Lumbar ROM:  3/24/2021   Date:      *Indicate scale AROM AROM AROM   Lumbar Flexion      Lumbar Extension       Right Left Right Left Right Left   Lumbar Sidebending         Lumbar Rotation         Thoracic Flexion      Thoracic Extension      Thoracic Sidebending         Thoracic Rotation           Lower Extremity Strength:   3/24/2021   Date:      LE strength/5 Right Left Right Left Right Left   Hip Flexion (L1-3) 4- 4-       Hip Extension (L5-S1)         Hip Abduction (L4-5)         Hip Adduction (L2-3)         Hip External Rotation         Hip Internal Rotation         Knee Extension (L3-4) 4+ 4+       Knee Flexion 4+ 4+       Ankle Dorsiflexion (L4-5)         Great Toe Extension (L5)         Ankle Plantar flexion (S1)         Abdominals        Palpation: Hypomobile fascia of abdomen, LE's, spine, thorax, cranium. Poor motility of abdomen. Tenderness over R lateral heel/calcaneua and not over plantar fascia. Pain with palpation over R ITB and in B lateral knees.     Lumbar Special Tests:    3/24/2021   Lumbar Special Tests Right Left SI Tests Right  Left   Quadrant test   SI Compression     Straight leg raise WFL WFL SI Distraction     Crossover response   POSH Test     Slump   Sacral Thrust     Sit-up test  FADIR     Trunk extensor endurance test  Resisted Abduction     Prone instability test  Other:     Pubic shotgun  Other:            Treatment Today   3/24/2021   TREATMENT MINUTES COMMENTS   Evaluation 15 low   Self-care/ Home management     Manual therapy 25 Fascial release using Strain-Counterstrain of B pre-gang-N distal gray complete, R lower abd-V   Neuromuscular Re-education     Therapeutic Activity     Therapeutic Exercises 10 Ed on seated and standing HEP to begin movement with her LE's to work into going into a gym.    Gait training     Modality__________________                Total 50    Blank areas are intentional and mean the treatment did not include these items.       PT Evaluation Code: (Please list factors)  Patient History/Comorbidities: 3+  Examination: 1-2  Clinical Presentation: stable  Clinical Decision Making: low    Patient History/  Comorbidities Examination  (body structures and functions, activity limitations, and/or participation restrictions) Clinical Presentation Clinical Decision Making (Complexity)   No documented Comorbidities or personal factors 1-2 Elements Stable and/or uncomplicated Low   1-2 documented comorbidities or personal factor 3 Elements Evolving clinical presentation with changing characteristics Moderate   3-4 documented comorbidities or personal factors 4 or more Unstable and unpredictable High               Bennie Hernandez PT, ATC  3/24/2021  2:02 PM

## 2021-07-05 PROBLEM — I10 BENIGN ESSENTIAL HYPERTENSION: Status: RESOLVED | Noted: 2018-07-17 | Resolved: 2021-01-01

## 2021-07-05 PROBLEM — R10.32 ABDOMINAL PAIN, CHRONIC, BILATERAL LOWER QUADRANT: Status: RESOLVED | Noted: 2018-07-17 | Resolved: 2021-01-01

## 2021-07-05 PROBLEM — G89.29 ABDOMINAL PAIN, CHRONIC, BILATERAL LOWER QUADRANT: Status: RESOLVED | Noted: 2018-07-17 | Resolved: 2021-01-01

## 2021-07-05 PROBLEM — I99.8 ISCHEMIA OF FINGER: Status: RESOLVED | Noted: 2019-05-16 | Resolved: 2021-01-01

## 2021-07-05 PROBLEM — R10.31 ABDOMINAL PAIN, CHRONIC, BILATERAL LOWER QUADRANT: Status: RESOLVED | Noted: 2018-07-17 | Resolved: 2021-01-01

## 2021-07-14 PROBLEM — I10 BENIGN ESSENTIAL HYPERTENSION: Status: RESOLVED | Noted: 2018-07-17 | Resolved: 2021-01-01

## 2021-07-20 NOTE — TELEPHONE ENCOUNTER
Reason for Call:  Request for results:    Name of test or procedure: CTA CHEST WITH CONTRAST    Date of test of procedure: 07/16/2021    Location of the test or procedure: [facility]    OK to leave the result message on voice mail or with a family member? YES    Phone number Patient can be reached at:  Home number on file [phone] (home)    Additional comments: any    Call taken on 7/20/2021 at 3:29 PM by SARA MEZA I

## 2021-07-20 NOTE — TELEPHONE ENCOUNTER
For unclear reasons, there is not any report yet on her CTA that was completed on July 16.  She should check back in the next 1 to 2 days to see if it is available.  Let her know that Dr Lemus is away from the office these several weeks and another provider can review the results if she calls in the next 1 to 2 days.

## 2021-07-21 NOTE — TELEPHONE ENCOUNTER
I did reach out to the CT department and they said because it is interventional, they take a little longer then a regular CT. They are going to send a message over to the radiologist to f/u for report. I did contact patient and let her know that we will contact her as soon as this returns. She verbalized understanding.    Angelika Merlos CMA ............... 9:09 AM, 07/21/21

## 2021-07-22 NOTE — TELEPHONE ENCOUNTER
Her CTA shows no change in known ascending aortic aneurysm still measuring 4.1 cm.  No intervention is needed at this time but annual imaging should be completed.

## 2021-07-22 NOTE — TELEPHONE ENCOUNTER
Report is complete. Please advise regarding results and next steps if needed.  Angelika Merlos CMA ............... 5:21 PM, 07/22/21

## 2021-07-23 NOTE — PROGRESS NOTES
[facility]          OUTPATIENT PHYSICAL THERAPY ORTHOPEDIC EVALUATION  PLAN OF TREATMENT FOR OUTPATIENT REHABILITATION  (COMPLETE FOR INITIAL CLAIMS ONLY)  Patient's Last Name, First Name, M.I.  YOB: 1950  Anais Kelly    Provider s Name:  [facility]   Medical Record No.  [id number]   Start of Care Date:      Onset Date:      Type:     _X__PT   ___OT   ___SLP Medical Diagnosis:  LBP     PT Diagnosis:      Visits from SOC:  1      _________________________________________________________________________________  Plan of Treatment/Functional Goals:              Goals  Goal Identifier: 1  Goal Description: Pt. will demonstrate/verbalize independence in self-management of condition in : 12 weeks       Goal Identifier: 2  Goal Description: Pt. will report decreased intensity, frequency of : Pain;in 12 weeks       Goal Identifier: 3  Goal Description: Pt. will be able to walk : 30 minutes;on even surfaces;with less difficulty;for household mobility;for community mobility;for exercise/recreation;in 12 weeks       Goal Identifier: 4  Goal Description: Patient will decrease : TREVER score;by 10 points;for improved quality of function;for improved quality of life;in 12 weeks                                                   Therapy Frequency:     Predicted Duration of Therapy Intervention:       JANES HERNANDEZ, PT                 I CERTIFY THE NEED FOR THESE SERVICES FURNISHED UNDER        THIS PLAN OF TREATMENT AND WHILE UNDER MY CARE     (Physician co-signature of this document indicates review and certification of the therapy plan).                       Certification Date From:  06/23/21   Certification Date To:  09/22/21    Referring Provider:  Lopez Gordon MD    Initial Assessment        See Epic Evaluation               [facility] Daily Progress      Patient Name: Anais Kelly  Date: 7/23/2021  Visit #: 11   PTA visit  #:     Referral Diagnosis: LBP  Referring provider: Lopez Gordon MD  Visit Diagnosis:       ICD-10-CM     1. Chronic bilateral low back pain without sciatica  M54.5       G89.29     2. Pain in joint involving right ankle and foot  M25.571     3. Chronic pain of both knees  M25.561       M25.562       G89.29     4. Myofascial pain  M79.18              Assessment:      Patient is benefitting from skilled physical therapy and is making steady progress toward functional goals.  Patient is appropriate to continue with skilled physical therapy intervention, as indicated by initial plan of care.   She continues to be very sensitive in her R knee but I believe it is due to lying flat. She did have good release of fascial tensions today which should help to improve her pain in her lower back and LE's.       Goal Status:  Pt. will demonstrate/verbalize independence in self-management of condition in : 12 weeks  Pt. will report decreased intensity, frequency of : Pain;in 12 weeks  Pt. will be able to walk : 30 minutes;on even surfaces;with less difficulty;for household mobility;for community mobility;for exercise/recreation;in 12 weeks     Patient will decrease : TREVER score;by _ points;for improved quality of function;for improved quality of life;in 12 weeks  by ___ points: 10           Plan / Patient Education:      Continue with initial plan of care.       Subjective:      Pain Ratin-6 in the heel/knee .   The R knee is really bad and has been giving quite a bit of trouble. The lower back has also been sore. Today she is actually doing pretty good.   She can get up from sitting position in the clinic today.    She did have an angiogram and her aortic dilation stayed the same.      Objective:      LV, MS, neural fascial tensions.         Treatment Today      TREATMENT MINUTES COMMENTS   Evaluation       Self-care/ Home management       Manual therapy 25 Fascial release using Strain-Counterstrain of BLE/lumbopelvic-LV,  BLE-N, R knee (C)-MS/.    Neuromuscular Re-education 15 Recip inhib of MS, LV, neural fascia   Therapeutic Activity       Therapeutic Exercises 15 AA/PROM to BLE, pelvis, L-spine   Gait training       Modality__________________                       Total 55     Blank areas are intentional and mean the treatment did not include these items.         Bennie Hernandez

## 2021-07-23 NOTE — TELEPHONE ENCOUNTER
Her CTA showed:     Remainder of the descending thoracic aorta is widely patent with mild calcified atherosclerotic disease.    Mild plaque buildup.

## 2021-07-23 NOTE — TELEPHONE ENCOUNTER
Called pt and gave results. Pt is wondering if the CTA showed any coronary calcifications as it did on the 2020 CTA report. Thanks.

## 2021-07-30 NOTE — PROGRESS NOTES
[facility] Daily Progress      Patient Name: Anais Kelly  Date: 2021  Visit #: 12   (cert due 21)  PTA visit #:     Referral Diagnosis: LBP  Referring provider: Lopez Gordon MD  Visit Diagnosis:       ICD-10-CM     1. Chronic bilateral low back pain without sciatica  M54.5       G89.29     2. Pain in joint involving right ankle and foot  M25.571     3. Chronic pain of both knees  M25.561       M25.562       G89.29     4. Myofascial pain  M79.18              Assessment:      Patient is benefitting from skilled physical therapy and is making steady progress toward functional goals.  Patient is appropriate to continue with skilled physical therapy intervention, as indicated by initial plan of care.   There was good release of swelling and point tenderness today in her lateral R knee. There is likely some distal ITB bursa issues which are giving her pain.      Goal Status:  Pt. will demonstrate/verbalize independence in self-management of condition in : 12 weeks  Pt. will report decreased intensity, frequency of : Pain;in 12 weeks  Pt. will be able to walk : 30 minutes;on even surfaces;with less difficulty;for household mobility;for community mobility;for exercise/recreation;in 12 weeks     Patient will decrease : TREVER score;by _ points;for improved quality of function;for improved quality of life;in 12 weeks  by ___ points: 10           Plan / Patient Education:      Continue with initial plan of care.       Subjective:      Pain Ratin.   The knee is still really bad which does affect her back and foot. She is getting cortisone on Wednesday in the knee.    The knee did get better after the last visit.      Objective:      LV, neural fascial tensions.         Treatment Today      TREATMENT MINUTES COMMENTS   Evaluation       Self-care/ Home management       Manual therapy 25 Fascial release using Strain-Counterstrain of BLE/lumbopelvic-LV, BLE-N (scar on R)   Neuromuscular Re-education  15 Recip inhib of LV, neural fascia   Therapeutic Activity       Therapeutic Exercises 15 AA/PROM to BLE, pelvis, L-spine   Gait training       Modality__________________                       Total 55     Blank areas are intentional and mean the treatment did not include these items.         Bennie Hernandez

## 2021-08-10 NOTE — PROGRESS NOTES
[facility] Daily Progress      Patient Name: Anais Kelly  Date: 2021  Visit #: 12   (cert due 21)  PTA visit #:     Referral Diagnosis: LBP  Referring provider: Lopez Gordon MD  Visit Diagnosis:       ICD-10-CM     1. Chronic bilateral low back pain without sciatica  M54.5       G89.29     2. Pain in joint involving right ankle and foot  M25.571     3. Chronic pain of both knees  M25.561       M25.562       G89.29     4. Myofascial pain  M79.18              Assessment:      Patient is benefitting from skilled physical therapy and is making steady progress toward functional goals.  Patient is appropriate to continue with skilled physical therapy intervention, as indicated by initial plan of care.   She did have good release of fascial tensions today. In working further up into the lymphatic ducts it should help to drain her whole system better.      Goal Status:  Pt. will demonstrate/verbalize independence in self-management of condition in : 12 weeks  Pt. will report decreased intensity, frequency of : Pain;in 12 weeks  Pt. will be able to walk : 30 minutes;on even surfaces;with less difficulty;for household mobility;for community mobility;for exercise/recreation;in 12 weeks     Patient will decrease : TREVER score;by _ points;for improved quality of function;for improved quality of life;in 12 weeks  by ___ points: 10           Plan / Patient Education:      Continue with initial plan of care.       Subjective:      Pain Ratin in the knees  She did get injections in her knees and that really has made a good difference. Her foot and outside of her R knee is still sore.   Today she is really having a bad HA.      Objective:      LV fascial tensions.         Treatment Today      TREATMENT MINUTES COMMENTS   Evaluation       Self-care/ Home management       Manual therapy 25 Fascial release using Strain-Counterstrain of BLE/lumbopelvic-LV, B cervical/sternal-LV   Neuromuscular  Re-education 15 Recip inhib of LV fascia   Therapeutic Activity       Therapeutic Exercises 15 AA/PROM to BLE, pelvis, CTL-spine   Gait training       Modality__________________                       Total 55     Blank areas are intentional and mean the treatment did not include these items.         Bennie Hernandez

## 2021-08-23 NOTE — PROGRESS NOTES
[facility] Daily Progress      Patient Name: Anais Kelly  Date: 2021  Visit #: 14  (cert due 21)  PTA visit #:     Referral Diagnosis: LBP  Referring provider: Lopez Gordon MD  Visit Diagnosis:       ICD-10-CM     1. Chronic bilateral low back pain without sciatica  M54.5       G89.29     2. Pain in joint involving right ankle and foot  M25.571     3. Chronic pain of both knees  M25.561       M25.562       G89.29     4. Myofascial pain  M79.18              Assessment:      Patient is benefitting from skilled physical therapy and is making steady progress toward functional goals.  Patient is appropriate to continue with skilled physical therapy intervention, as indicated by initial plan of care.   There continues to be good release of fascial tensions but with her OA in her LE's it does seem to be coming back. She would benefit from adding in more exercise at this point.      Goal Status:  Pt. will demonstrate/verbalize independence in self-management of condition in : 12 weeks  Pt. will report decreased intensity, frequency of : Pain;in 12 weeks  Pt. will be able to walk : 30 minutes;on even surfaces;with less difficulty;for household mobility;for community mobility;for exercise/recreation;in 12 weeks     Patient will decrease : TREVER score;by _ points;for improved quality of function;for improved quality of life;in 12 weeks  by ___ points: 10           Plan / Patient Education:      Continue with initial plan of care.       Subjective:      Pain Ratin in the knees  The knees are fairly good. The ITB on the R does still hurt. She does have a spot of her R foot that can be really sore when stepping right on that area.    She did walk 5000 steps the other day in the mall and that did make her sore.     Objective:      LV, art fascial tensions.         Treatment Today      TREATMENT MINUTES COMMENTS   Evaluation       Self-care/ Home management       Manual therapy 25 Fascial release  using Strain-Counterstrain of BLE/lumbopelvic-LV, BLE-A   Neuromuscular Re-education 15 Recip inhib of LV, art fascia   Therapeutic Activity       Therapeutic Exercises 15 AA/PROM to BLE, pelvis, CTL-spine   Gait training       Modality__________________                       Total 55     Blank areas are intentional and mean the treatment did not include these items.         Bennie Hernandez

## 2021-09-20 NOTE — PROGRESS NOTES
Rehabilitation Services      OUTPATIENT PHYSICAL THERAPY  PLAN OF TREATMENT FOR OUTPATIENT REHABILITATION    Patient's Last Name, First Name, M.I.                YOB: 1950  Anais Kelly                        Provider's Name  JANES HERNANDEZ, PT Medical Record No.  [id number]                               Onset Date: 3/9/21   Start of Care Date: 3/24/21   Type:     _X_PT   ___OT   ___SLP Medical Diagnosis: LBP                       PT Diagnosis: LBP, myofascial pain      _________________________________________________________________________________  Plan of Treatment:    Frequency/Duration: 1x/week     Goals:  Goal Identifier 1   Goal Description Pt. will demonstrate/verbalize independence in self-management of condition in : 12 weeks   Target Date     Date Met      Progress (detail required for progress note): SHE IS PROGRESSING AND HAS BEEN FAIRLY COMPLIANT WITH HEP TO THIS POINT.      Goal Identifier 2   Goal Description Pt. will report decreased intensity, frequency of : Pain;in 12 weeks   Target Date     Date Met      Progress (detail required for progress note): SLOW PROGRESSION. PAIN RANGE IS FROM 3-8/10.      Goal Identifier 3   Goal Description Pt. will be able to walk : 30 minutes;on even surfaces;with less difficulty;for household mobility;for community mobility;for exercise/recreation;in 12 weeks   Target Date     Date Met      Progress (detail required for progress note): GOAL MET     Goal Identifier 4   Goal Description Patient will decrease : TREVER score;by 10 points;for improved quality of function;for improved quality of life;in 12 weeks   Target Date     Date Met      Progress (detail required for progress note):       Goal Identifier     Goal Description     Target Date     Date Met      Progress (detail required for progress note):       Goal Identifier     Goal Description     Target Date      Date Met      Progress (detail required for progress note):       Goal Identifier     Goal Description     Target Date     Date Met      Progress (detail required for progress note):       Goal Identifier     Goal Description     Target Date     Date Met      Progress (detail required for progress note):             Certification date from 9/20/21 to 12/20/21.    JANES HERNANDEZ, PT          I CERTIFY THE NEED FOR THESE SERVICES FURNISHED UNDER        THIS PLAN OF TREATMENT AND WHILE UNDER MY CARE     (Physician co-signature of this document indicates review and certification of the therapy plan).                Referring Provider: Lopez Gordon MD       09/20/21 1000   Signing Clinician's Name / Credentials   Signing clinician's name / credentials Janes Hernandez PT   Session Number   Session Number 16   Adult Goals   PT Ortho Eval Goals 1;2;3;4   Ortho Goal 1   Goal Identifier 1   Goal Description Pt. will demonstrate/verbalize independence in self-management of condition in : 12 weeks   Goal Progress SHE IS PROGRESSING AND HAS BEEN FAIRLY COMPLIANT WITH HEP TO THIS POINT.    Ortho Goal 2   Goal Identifier 2   Goal Description Pt. will report decreased intensity, frequency of : Pain;in 12 weeks   Goal Progress SLOW PROGRESSION. PAIN RANGE IS FROM 3-8/10.    Ortho Goal 3   Goal Identifier 3   Goal Description Pt. will be able to walk : 30 minutes;on even surfaces;with less difficulty;for household mobility;for community mobility;for exercise/recreation;in 12 weeks   Goal Progress GOAL MET   Ortho Goal 4   Goal Identifier 4   Goal Description Patient will decrease : TREVER score;by 10 points;for improved quality of function;for improved quality of life;in 12 weeks   Subjective Report   Subjective Report Things are going ok for her. The knee doesn't seem to have lasted as long as she had hoped. Right now she is at a 5/10 pain. Walking she is able to do for about an hour at the moment but that is holding on the  cart. This actually seems to be better for then sitting/standing at the moment.    Objective Measures   Objective Measures Objective Measure 1   Objective Measure 1   Objective Measure MS, LV, neural fascial tensions.    Treatment Interventions   Interventions Therapeutic Procedure/Exercise;Neuromuscular Re-education;Manual Therapy   Therapeutic Procedure/exercise   Therapeutic Procedures: strength, endurance, ROM, flexibillity minutes (67390) 15   Skilled Intervention AA/PROM to BLE, TL spine. Ed on sleeping position with pillow between knees.    Neuromuscular Re-education   Neuromuscular re-ed of mvmt, balance, coord, kinesthetic sense, posture, proprioception minutes (18306) 15   Skilled Intervention Recip inhib of art, neural fascia   Manual Therapy   Manual Therapy: Mobilization, MFR, MLD, friction massage minutes (18717) 25   Skilled Intervention Fascial release using Strain-Counterstrain of RLE/lumboepvlic-A, R knee-N   Assessments Completed   Assessments Completed She does seem to be improving on her function but due to degenerative changes these are slowed. She does continue to benefit from skilled PT at this time.    Plan   Plan for next session Plan to con't with manual therapy to decrease fascial tension/tone to normalize ROM/decrease inflammation/decrease mm tone and improve proprioception.     Total Session Time   Timed Code Treatment Minutes 55   Total Treatment Time (sum of timed and untimed services) 55

## 2021-10-19 NOTE — TELEPHONE ENCOUNTER
"  Disp Refills Start End SALTY    PREMARIN 0.625 mg tablet 90 tablet 0 6/8/2021  No   Sig: Take 1 tablet by mouth once daily   Sent to pharmacy as: Premarin 0.625 mg tablet (estrogens (conjugated))   E-Prescribing Status: Receipt confirmed by pharmacy (6/8/2021  9:34 AM CDT)     Last Written Prescription Date:  6/8/21  Last Fill Quantity: 90,  # refills: 0   Last office visit provider:  6/29/21     Requested Prescriptions   Pending Prescriptions Disp Refills     PREMARIN 0.625 MG tablet [Pharmacy Med Name: Premarin 0.625 MG Oral Tablet] 90 tablet 0     Sig: Take 1 tablet by mouth once daily       Hormone Replacement Therapy Passed - 10/18/2021  5:31 AM        Passed - Blood pressure under 140/90 in past 12 months     BP Readings from Last 3 Encounters:   06/29/21 112/78   05/26/21 104/80   02/10/21 114/86                 Passed - Recent (12 mo) or future (30 days) visit within the authorizing provider's specialty     Patient has had an office visit with the authorizing provider or a provider within the authorizing providers department within the previous 12 mos or has a future within next 30 days. See \"Patient Info\" tab in inbasket, or \"Choose Columns\" in Meds & Orders section of the refill encounter.              Passed - Patient has mammogram in past 2 years on file if age 50-75        Passed - Medication is active on med list        Passed - Patient is 18 years of age or older        Passed - No active pregnancy on record        Passed - No positive pregnancy test on record in past 12 months             Akash Salinas RN 10/19/21 7:34 AM  "

## 2021-10-21 PROBLEM — F51.01 PRIMARY INSOMNIA: Status: ACTIVE | Noted: 2018-07-17

## 2021-10-21 NOTE — PATIENT INSTRUCTIONS
Increase trazodone to 2 or 3 pills at bedtime.  Continue Fosamax weekly.  You can see Vascular surgeon to discuss thoracic aorta aneurysm.

## 2021-10-23 NOTE — PROGRESS NOTES
(I77.810) Ascending aorta dilatation (H)  (primary encounter diagnosis)  Comment: CT scan done in 7/2020 and 7/2021.  1.  Continued dilation of the ascending thoracic aorta measuring up to 4.1 cm in maximal diameter which is not significant changed.    Patient has concerns and questions about these results, which we discussed today. She is not a candidate for surgery based on the size and stability of the dilatation, but would like to hear opinion from vascular specialist.   Plan: Vascular Surgery Referral            (M81.0) Age-related osteoporosis without current pathological fracture  Comment: tolerating Fosamax well, had one day feeling of motion sickness.    (H92.01) Otalgia, right  Comment: ear exam normal, no tenderness on palpation of the tragus and mastoid. She is actually showing the area below the right ear lobe, and below the upper third of the mandible. No palpable masses and no tenderness on palpation. This discomfort comes and goes for few weeks, and feels more after sleeping on that side. She will continue monitoring at home. Possible teeth grinding or TMJ arthritis as a cause of discomfort.      (F51.01) Primary insomnia  Comment: she will try to increase trazodone dose to 2 or 3 at bedtime. If not better, we will try different medication. She falls asleep easy but wakes up in the middle of the night.            This note has been dictated using voice recognition software. Any grammatical or context distortions are unintentional and inherent to the software.      Return in about 3 months (around 1/21/2022) for AWV.    Patient Instructions   Increase trazodone to 2 or 3 pills at bedtime.  Continue Fosamax weekly.  You can see Vascular surgeon to discuss thoracic aorta aneurysm.            Subjective:    Anais Kelly is a 71 year old female  here for follow up.      Social History     Socioeconomic History     Marital status:      Spouse name: Not on file     Number of children: Not on file      Years of education: Not on file     Highest education level: Not on file   Occupational History     Not on file   Tobacco Use     Smoking status: Never Smoker     Smokeless tobacco: Never Used   Substance and Sexual Activity     Alcohol use: Yes     Comment: Alcoholic Drinks/day: 1 a month     Drug use: No     Sexual activity: Not on file   Other Topics Concern     Not on file   Social History Narrative    Retired.    for 52 years and has 3 boys.       Social Determinants of Health     Financial Resource Strain:      Difficulty of Paying Living Expenses:    Food Insecurity:      Worried About Running Out of Food in the Last Year:      Ran Out of Food in the Last Year:    Transportation Needs:      Lack of Transportation (Medical):      Lack of Transportation (Non-Medical):    Physical Activity:      Days of Exercise per Week:      Minutes of Exercise per Session:    Stress:      Feeling of Stress :    Social Connections:      Frequency of Communication with Friends and Family:      Frequency of Social Gatherings with Friends and Family:      Attends [employer] witness Services:      Active Member of Clubs or Organizations:      Attends Club or Organization Meetings:      Marital Status:    Intimate Partner Violence:      Fear of Current or Ex-Partner:      Emotionally Abused:      Physically Abused:      Sexually Abused:        Family History   Problem Relation Age of Onset     Neurologic Disorder Mother         ms at age 30     Cancer Father 72.00        Bladder Cancer     Multiple Sclerosis Mother      No Known Problems Brother      Review of Systems:  A 12 point comprehensive review of systems was negative except as noted in HPI.        Objective:    Physical Exam   /82 (BP Location: Right arm, Patient Position: Sitting, Cuff Size: Adult Large)   Pulse 70   Wt 67.5 kg (148 lb 12.8 oz)   SpO2 98%   BMI 23.63 kg/m    Body mass index is 23.63 kg/m .    Constitutional: oriented to person, place, and time,  appears well-nourished. No distress.   HENT:   Head: Normocephalic.   Mouth/Throat: Oropharynx is clear and moist.   Eyes: Conjunctivae are normal.   Neck: Normal range of motion. Neck supple. No palpable masses or LN.  Ears: normal TMs and ear canals.    Pulmonary/Chest: Effort normal   Musculoskeletal: Normal range of motion.   Neurological: alert and oriented to person, place, and time.  Psychiatric:  normal mood and affect.      Patient Active Problem List   Diagnosis     Irritable bowel syndrome with diarrhea     Primary insomnia     Small fiber neuropathy (H); diagnosed by neurology     Hormone replacement therapy, postmenopausal     Age-related osteoporosis without current pathological fracture     Ascending aorta dilatation (H)     Coronary artery calcification seen on CAT scan     Hyperlipidemia LDL goal <70       Current Outpatient Medications   Medication     alendronate (FOSAMAX) 70 MG tablet     atorvastatin (LIPITOR) 10 MG tablet     CALCIUM 600 600 MG OR TABS     CENTRUM SILVER OR     Dicyclomine HCl (BENTYL PO)     erythromycin (ROMYCIN) 5 MG/GM ophthalmic ointment     famotidine (PEPCID) 20 MG tablet     omeprazole (PRILOSEC) 20 MG CR capsule     PREMARIN 0.625 MG tablet     RESTASIS 0.05 % ophthalmic emulsion     traZODone (DESYREL) 50 MG tablet     vitamin B-12 (CYANOCOBALAMIN) 1000 MCG tablet     VITAMIN D-3 OR     No current facility-administered medications for this visit.               Emilee Lemus MD  10/21/2021

## 2021-11-16 NOTE — TELEPHONE ENCOUNTER
Refill Request  Medication name: Pending Prescriptions:                       Disp   Refills    traZODone (DESYREL) 50 MG tablet                            Who prescribed the medication: Heidi  Last refill on medication: 10/21/21  Requested Pharmacy: [facility]  Last appointment with PCP: 10/21/21  Next appointment: Not due

## 2022-01-01 ENCOUNTER — HOSPITAL ENCOUNTER (OUTPATIENT)
Dept: PHYSICAL THERAPY | Facility: REHABILITATION | Age: 72
End: 2022-02-07
Payer: COMMERCIAL

## 2022-01-01 ENCOUNTER — HOSPITAL ENCOUNTER (OUTPATIENT)
Dept: PHYSICAL THERAPY | Facility: REHABILITATION | Age: 72
End: 2022-01-11
Payer: COMMERCIAL

## 2022-01-01 ENCOUNTER — HEALTH MAINTENANCE LETTER (OUTPATIENT)
Age: 72
End: 2022-01-01

## 2022-01-01 ENCOUNTER — HOSPITAL ENCOUNTER (EMERGENCY)
Facility: HOSPITAL | Age: 72
End: 2022-04-21
Attending: EMERGENCY MEDICINE | Admitting: EMERGENCY MEDICINE
Payer: COMMERCIAL

## 2022-01-01 ENCOUNTER — HOSPITAL ENCOUNTER (OUTPATIENT)
Dept: PHYSICAL THERAPY | Facility: REHABILITATION | Age: 72
Discharge: HOME | End: 2022-04-12
Payer: COMMERCIAL

## 2022-01-01 ENCOUNTER — HOSPITAL ENCOUNTER (OUTPATIENT)
Dept: PHYSICAL THERAPY | Facility: REHABILITATION | Age: 72
End: 2022-02-21
Payer: COMMERCIAL

## 2022-01-01 ENCOUNTER — ANCILLARY PROCEDURE (OUTPATIENT)
Dept: BONE DENSITY | Facility: CLINIC | Age: 72
End: 2022-01-01
Attending: INTERNAL MEDICINE
Payer: COMMERCIAL

## 2022-01-01 ENCOUNTER — TELEPHONE (OUTPATIENT)
Dept: INTERNAL MEDICINE | Facility: CLINIC | Age: 72
End: 2022-01-01
Payer: COMMERCIAL

## 2022-01-01 ENCOUNTER — TRANSFERRED RECORDS (OUTPATIENT)
Dept: HEALTH INFORMATION MANAGEMENT | Facility: CLINIC | Age: 72
End: 2022-01-01
Payer: COMMERCIAL

## 2022-01-01 ENCOUNTER — HOSPITAL ENCOUNTER (OUTPATIENT)
Dept: CARDIOLOGY | Facility: CLINIC | Age: 72
Discharge: HOME | End: 2022-02-01
Attending: INTERNAL MEDICINE | Admitting: INTERNAL MEDICINE
Payer: COMMERCIAL

## 2022-01-01 ENCOUNTER — LAB (OUTPATIENT)
Dept: LAB | Facility: CLINIC | Age: 72
End: 2022-01-01

## 2022-01-01 ENCOUNTER — OFFICE VISIT (OUTPATIENT)
Dept: INTERNAL MEDICINE | Facility: CLINIC | Age: 72
End: 2022-01-01
Payer: COMMERCIAL

## 2022-01-01 ENCOUNTER — OFFICE VISIT (OUTPATIENT)
Dept: CARDIOLOGY | Facility: CLINIC | Age: 72
End: 2022-01-01
Payer: COMMERCIAL

## 2022-01-01 VITALS
DIASTOLIC BLOOD PRESSURE: 88 MMHG | SYSTOLIC BLOOD PRESSURE: 120 MMHG | BODY MASS INDEX: 24.27 KG/M2 | OXYGEN SATURATION: 98 % | WEIGHT: 151 LBS | HEIGHT: 66 IN | HEART RATE: 75 BPM

## 2022-01-01 VITALS
SYSTOLIC BLOOD PRESSURE: 143 MMHG | HEART RATE: 206 BPM | RESPIRATION RATE: 101 BRPM | TEMPERATURE: 97.6 F | OXYGEN SATURATION: 100 % | DIASTOLIC BLOOD PRESSURE: 71 MMHG

## 2022-01-01 VITALS
DIASTOLIC BLOOD PRESSURE: 84 MMHG | BODY MASS INDEX: 24.27 KG/M2 | WEIGHT: 151 LBS | OXYGEN SATURATION: 98 % | HEIGHT: 66 IN | SYSTOLIC BLOOD PRESSURE: 134 MMHG | HEART RATE: 77 BPM

## 2022-01-01 VITALS
BODY MASS INDEX: 23.66 KG/M2 | WEIGHT: 149 LBS | RESPIRATION RATE: 16 BRPM | HEART RATE: 84 BPM | SYSTOLIC BLOOD PRESSURE: 108 MMHG | DIASTOLIC BLOOD PRESSURE: 76 MMHG

## 2022-01-01 DIAGNOSIS — K58.0 IRRITABLE BOWEL SYNDROME WITH DIARRHEA: ICD-10-CM

## 2022-01-01 DIAGNOSIS — G89.29 CHRONIC BILATERAL LOW BACK PAIN WITHOUT SCIATICA: Primary | ICD-10-CM

## 2022-01-01 DIAGNOSIS — M79.18 MYOFASCIAL PAIN: ICD-10-CM

## 2022-01-01 DIAGNOSIS — G62.9 SMALL FIBER NEUROPATHY: ICD-10-CM

## 2022-01-01 DIAGNOSIS — M81.0 AGE-RELATED OSTEOPOROSIS WITHOUT CURRENT PATHOLOGICAL FRACTURE: ICD-10-CM

## 2022-01-01 DIAGNOSIS — I77.810 ASCENDING AORTA DILATATION (H): ICD-10-CM

## 2022-01-01 DIAGNOSIS — M25.571 PAIN IN JOINT INVOLVING RIGHT ANKLE AND FOOT: ICD-10-CM

## 2022-01-01 DIAGNOSIS — N95.2 ATROPHIC VAGINITIS: ICD-10-CM

## 2022-01-01 DIAGNOSIS — G89.29 CHRONIC PAIN OF BOTH KNEES: ICD-10-CM

## 2022-01-01 DIAGNOSIS — N63.15 UNSPECIFIED LUMP IN THE RIGHT BREAST, OVERLAPPING QUADRANTS: ICD-10-CM

## 2022-01-01 DIAGNOSIS — N94.89 VAGINAL BURNING: Primary | ICD-10-CM

## 2022-01-01 DIAGNOSIS — E78.5 HYPERLIPIDEMIA LDL GOAL <70: ICD-10-CM

## 2022-01-01 DIAGNOSIS — R30.0 DYSURIA: ICD-10-CM

## 2022-01-01 DIAGNOSIS — F51.01 PRIMARY INSOMNIA: ICD-10-CM

## 2022-01-01 DIAGNOSIS — M54.50 CHRONIC BILATERAL LOW BACK PAIN WITHOUT SCIATICA: Primary | ICD-10-CM

## 2022-01-01 DIAGNOSIS — I46.9 CARDIAC ARREST (H): ICD-10-CM

## 2022-01-01 DIAGNOSIS — E78.00 PURE HYPERCHOLESTEROLEMIA, UNSPECIFIED: ICD-10-CM

## 2022-01-01 DIAGNOSIS — M25.561 CHRONIC PAIN OF BOTH KNEES: ICD-10-CM

## 2022-01-01 DIAGNOSIS — M25.562 CHRONIC PAIN OF BOTH KNEES: ICD-10-CM

## 2022-01-01 DIAGNOSIS — N63.10 LUMP OF RIGHT BREAST: Primary | ICD-10-CM

## 2022-01-01 DIAGNOSIS — Z00.00 ENCOUNTER FOR MEDICARE ANNUAL WELLNESS EXAM: Primary | ICD-10-CM

## 2022-01-01 DIAGNOSIS — N63.14 UNSPECIFIED LUMP IN THE RIGHT BREAST, LOWER INNER QUADRANT: ICD-10-CM

## 2022-01-01 DIAGNOSIS — N63.10 LUMP OF RIGHT BREAST: ICD-10-CM

## 2022-01-01 DIAGNOSIS — I25.10 CORONARY ARTERY CALCIFICATION SEEN ON CAT SCAN: ICD-10-CM

## 2022-01-01 DIAGNOSIS — N64.89 OTHER SPECIFIED DISORDERS OF BREAST: ICD-10-CM

## 2022-01-01 DIAGNOSIS — I25.10 CORONARY ARTERY CALCIFICATION SEEN ON CAT SCAN: Primary | ICD-10-CM

## 2022-01-01 LAB
ALBUMIN SERPL-MCNC: 4 G/DL (ref 3.5–5)
ALBUMIN UR-MCNC: NEGATIVE MG/DL
ALP SERPL-CCNC: 76 U/L (ref 45–120)
ALT SERPL W P-5'-P-CCNC: 17 U/L (ref 0–45)
ANION GAP SERPL CALCULATED.3IONS-SCNC: 8 MMOL/L (ref 5–18)
APPEARANCE UR: CLEAR
AST SERPL W P-5'-P-CCNC: 17 U/L (ref 0–40)
BACTERIA #/AREA URNS HPF: ABNORMAL /HPF
BILIRUB SERPL-MCNC: 0.5 MG/DL (ref 0–1)
BILIRUB UR QL STRIP: NEGATIVE
BUN SERPL-MCNC: 12 MG/DL (ref 8–28)
CALCIUM SERPL-MCNC: 10.2 MG/DL (ref 8.5–10.5)
CAOX CRY #/AREA URNS HPF: ABNORMAL /HPF
CHLORIDE BLD-SCNC: 104 MMOL/L (ref 98–107)
CHOLEST SERPL-MCNC: 198 MG/DL
CLUE CELLS: ABNORMAL
CO2 SERPL-SCNC: 28 MMOL/L (ref 22–31)
COLOR UR AUTO: YELLOW
CREAT SERPL-MCNC: 0.76 MG/DL (ref 0.6–1.1)
DEPRECATED CALCIDIOL+CALCIFEROL SERPL-MC: 54 UG/L (ref 20–75)
ERYTHROCYTE [DISTWIDTH] IN BLOOD BY AUTOMATED COUNT: 13.9 % (ref 10–15)
FASTING STATUS PATIENT QL REPORTED: YES
GFR SERPL CREATININE-BSD FRML MDRD: 82 ML/MIN/1.73M2
GLUCOSE BLD-MCNC: 95 MG/DL (ref 70–125)
GLUCOSE BLDC GLUCOMTR-MCNC: 188 MG/DL (ref 70–99)
GLUCOSE UR STRIP-MCNC: NEGATIVE MG/DL
HCT VFR BLD AUTO: 43 % (ref 35–47)
HDLC SERPL-MCNC: 85 MG/DL
HGB BLD-MCNC: 14.2 G/DL (ref 11.7–15.7)
HGB UR QL STRIP: NEGATIVE
HOLD SPECIMEN: NORMAL
KETONES UR STRIP-MCNC: NEGATIVE MG/DL
LDLC SERPL CALC-MCNC: 94 MG/DL
LEUKOCYTE ESTERASE UR QL STRIP: NEGATIVE
LVEF ECHO: NORMAL
MCH RBC QN AUTO: 32.1 PG (ref 26.5–33)
MCHC RBC AUTO-ENTMCNC: 33 G/DL (ref 31.5–36.5)
MCV RBC AUTO: 97 FL (ref 78–100)
NITRATE UR QL: NEGATIVE
PH UR STRIP: 6 [PH] (ref 5–8)
PLATELET # BLD AUTO: 239 10E3/UL (ref 150–450)
POTASSIUM BLD-SCNC: 3.9 MMOL/L (ref 3.5–5)
PROT SERPL-MCNC: 7 G/DL (ref 6–8)
RBC # BLD AUTO: 4.43 10E6/UL (ref 3.8–5.2)
RBC #/AREA URNS AUTO: ABNORMAL /HPF
SODIUM SERPL-SCNC: 140 MMOL/L (ref 136–145)
SP GR UR STRIP: 1.02 (ref 1–1.03)
SQUAMOUS #/AREA URNS AUTO: ABNORMAL /LPF
TRICHOMONAS, WET PREP: ABNORMAL
TRIGL SERPL-MCNC: 95 MG/DL
TSH SERPL DL<=0.005 MIU/L-ACNC: 3.25 UIU/ML (ref 0.3–5)
UROBILINOGEN UR STRIP-ACNC: 0.2 E.U./DL
WBC # BLD AUTO: 5.8 10E3/UL (ref 4–11)
WBC #/AREA URNS AUTO: ABNORMAL /HPF
WBC'S/HIGH POWER FIELD, WET PREP: ABNORMAL
YEAST, WET PREP: ABNORMAL

## 2022-01-01 PROCEDURE — 97112 NEUROMUSCULAR REEDUCATION: CPT | Mod: GP | Performed by: PHYSICAL THERAPIST

## 2022-01-01 PROCEDURE — 31500 INSERT EMERGENCY AIRWAY: CPT

## 2022-01-01 PROCEDURE — 87210 SMEAR WET MOUNT SALINE/INK: CPT | Performed by: INTERNAL MEDICINE

## 2022-01-01 PROCEDURE — 97110 THERAPEUTIC EXERCISES: CPT | Mod: GP | Performed by: PHYSICAL THERAPIST

## 2022-01-01 PROCEDURE — 92950 HEART/LUNG RESUSCITATION CPR: CPT

## 2022-01-01 PROCEDURE — 93306 TTE W/DOPPLER COMPLETE: CPT

## 2022-01-01 PROCEDURE — 97140 MANUAL THERAPY 1/> REGIONS: CPT | Mod: GP | Performed by: PHYSICAL THERAPIST

## 2022-01-01 PROCEDURE — 85027 COMPLETE CBC AUTOMATED: CPT

## 2022-01-01 PROCEDURE — 77080 DXA BONE DENSITY AXIAL: CPT | Mod: TC | Performed by: RADIOLOGY

## 2022-01-01 PROCEDURE — 81001 URINALYSIS AUTO W/SCOPE: CPT | Performed by: INTERNAL MEDICINE

## 2022-01-01 PROCEDURE — 99214 OFFICE O/P EST MOD 30 MIN: CPT | Mod: 25 | Performed by: INTERNAL MEDICINE

## 2022-01-01 PROCEDURE — 36415 COLL VENOUS BLD VENIPUNCTURE: CPT

## 2022-01-01 PROCEDURE — 99214 OFFICE O/P EST MOD 30 MIN: CPT | Performed by: INTERNAL MEDICINE

## 2022-01-01 PROCEDURE — 99204 OFFICE O/P NEW MOD 45 MIN: CPT | Performed by: INTERNAL MEDICINE

## 2022-01-01 PROCEDURE — 99397 PER PM REEVAL EST PAT 65+ YR: CPT | Performed by: INTERNAL MEDICINE

## 2022-01-01 PROCEDURE — 99285 EMERGENCY DEPT VISIT HI MDM: CPT | Mod: 25

## 2022-01-01 PROCEDURE — 93306 TTE W/DOPPLER COMPLETE: CPT | Mod: 26 | Performed by: INTERNAL MEDICINE

## 2022-01-01 PROCEDURE — 80053 COMPREHEN METABOLIC PANEL: CPT

## 2022-01-01 PROCEDURE — 36680 INSERT NEEDLE BONE CAVITY: CPT

## 2022-01-01 PROCEDURE — 84443 ASSAY THYROID STIM HORMONE: CPT

## 2022-01-01 PROCEDURE — 272N000240 HC CARDIOVERT/DEFIB/PACER SUPP

## 2022-01-01 PROCEDURE — 82306 VITAMIN D 25 HYDROXY: CPT

## 2022-01-01 PROCEDURE — 80061 LIPID PANEL: CPT

## 2022-01-01 RX ORDER — ATORVASTATIN CALCIUM 20 MG/1
TABLET, FILM COATED ORAL
Qty: 90 TABLET | Refills: 3 | Status: SHIPPED | OUTPATIENT
Start: 2022-01-01 | End: 2022-01-01

## 2022-01-01 RX ORDER — LOPERAMIDE HCL 2 MG
CAPSULE ORAL
COMMUNITY
Start: 2022-01-01

## 2022-01-01 RX ORDER — NEOMYCIN SULFATE, POLYMYXIN B SULFATE AND DEXAMETHASONE 3.5; 10000; 1 MG/ML; [USP'U]/ML; MG/ML
SUSPENSION/ DROPS OPHTHALMIC
COMMUNITY
Start: 2022-01-01

## 2022-01-01 RX ORDER — ATORVASTATIN CALCIUM 10 MG/1
10 TABLET, FILM COATED ORAL DAILY
COMMUNITY

## 2022-01-01 RX ORDER — ALENDRONATE SODIUM 70 MG/1
70 TABLET ORAL WEEKLY
Qty: 12 TABLET | Refills: 3 | Status: SHIPPED | OUTPATIENT
Start: 2022-01-01

## 2022-01-01 RX ORDER — TRAZODONE HYDROCHLORIDE 50 MG/1
TABLET, FILM COATED ORAL
Qty: 90 TABLET | Refills: 3 | Status: SHIPPED | OUTPATIENT
Start: 2022-01-01

## 2022-01-01 RX ORDER — ESTRADIOL 0.1 MG/G
CREAM VAGINAL
Qty: 42.5 G | Refills: 3 | Status: SHIPPED | OUTPATIENT
Start: 2022-01-01

## 2022-01-01 ASSESSMENT — ACTIVITIES OF DAILY LIVING (ADL): CURRENT_FUNCTION: NO ASSISTANCE NEEDED

## 2022-01-12 NOTE — PROGRESS NOTES
[facility]    OUTPATIENT PHYSICAL THERAPY  PLAN OF TREATMENT FOR OUTPATIENT REHABILITATION AND PROGRESS NOTE           Patient's Last Name, First Name, Anais Murphy Date of Birth  1950   Provider's Name  [facility] Medical Record No.  [id number]    Onset Date  3/9/21 Start of Care Date  3/24/21   Type:     _X_PT   ___OT   ___SLP Medical Diagnosis  Chronic LBP, LE pain   PT Diagnosis  LBP, LE pain Plan of Treatment  Frequency/Duration: 1x/week  Certification date from 12/20/21 to 3/21/22     Goals:  Goal Identifier 1   Goal Description Pt. will demonstrate/verbalize independence in self-management of condition in : 12 weeks   Target Date     Date Met      Progress (detail required for progress note): SHE IS PROGRESSING AND HAS BEEN FAIRLY COMPLIANT WITH HEP TO THIS POINT.      Goal Identifier 2   Goal Description Pt. will report decreased intensity, frequency of : Pain;in 12 weeks   Target Date     Date Met      Progress (detail required for progress note): SLOW PROGRESSION. PAIN RANGE IS FROM 2-7/10.      Goal Identifier 3   Goal Description Pt. will be able to walk : 30 minutes;on even surfaces;with less difficulty;for household mobility;for community mobility;for exercise/recreation;in 12 weeks   Target Date     Date Met      Progress (detail required for progress note): GOAL MET - this has been declining due to the weather and her worsening knee function but she is able to do this.      Goal Identifier 4   Goal Description Patient will decrease : TREVER score;by 10 points;for improved quality of function;for improved quality of life;in 12 weeks   Target Date     Date Met      Progress (detail required for progress note):       Goal Identifier     Goal Description     Target Date     Date Met      Progress (detail required for progress note):       Goal  Identifier     Goal Description     Target Date     Date Met      Progress (detail required for progress note):       Goal Identifier     Goal Description     Target Date     Date Met      Progress (detail required for progress note):       Goal Identifier     Goal Description     Target Date     Date Met      Progress (detail required for progress note):                I CERTIFY THE NEED FOR THESE SERVICES FURNISHED UNDER        THIS PLAN OF TREATMENT AND WHILE UNDER MY CARE     (Physician co-signature of this document indicates review and certification of the therapy plan).                Referring Provider: MD JANES Ramey, PT           01/11/22 1500   Signing Clinician's Name / Credentials   Signing clinician's name / credentials Janes Hernandez PT   Session Number   Session Number 22   Progress Note/Recertification   Recertification Due Date 03/21/22   Adult Goals   PT Ortho Eval Goals 1;2;3;4   Ortho Goal 1   Goal Identifier 1   Goal Description Pt. will demonstrate/verbalize independence in self-management of condition in : 12 weeks   Goal Progress SHE IS PROGRESSING AND HAS BEEN FAIRLY COMPLIANT WITH HEP TO THIS POINT.    Ortho Goal 2   Goal Identifier 2   Goal Description Pt. will report decreased intensity, frequency of : Pain;in 12 weeks   Goal Progress SLOW PROGRESSION. PAIN RANGE IS FROM 2-7/10.    Ortho Goal 3   Goal Identifier 3   Goal Description Pt. will be able to walk : 30 minutes;on even surfaces;with less difficulty;for household mobility;for community mobility;for exercise/recreation;in 12 weeks   Goal Progress GOAL MET - this has been declining due to the weather and her worsening knee function but she is able to do this.    Ortho Goal 4   Goal Identifier 4   Goal Description Patient will decrease : TREVER score;by 10 points;for improved quality of function;for improved quality of life;in 12 weeks   Subjective Report   Subjective Report She is getting injections next week  in both knees to see if that helps. They are going going to do cortisone and may do more. There is a spot on her R foot which is sore and her lower back is really sore lately. Pain range is 2-7/10. She hasn't really been walking at all lately due to not going any where with Covid and the weather outside.    Objective Measures   Objective Measures Objective Measure 1   Objective Measure 1   Objective Measure MS, neural fascial tensions.    Treatment Interventions   Interventions Therapeutic Procedure/Exercise;Neuromuscular Re-education;Manual Therapy   Therapeutic Procedure/exercise   Therapeutic Procedures: strength, endurance, ROM, flexibillity minutes (22174) 15   Skilled Intervention AA/PROM to BLE, TL spine. Ed on sleeping position with pillow between knees.    Neuromuscular Re-education   Neuromuscular re-ed of mvmt, balance, coord, kinesthetic sense, posture, proprioception minutes (60243) 15   Skilled Intervention Recip inhib of MS, neural fascia   Manual Therapy   Manual Therapy: Mobilization, MFR, MLD, friction massage minutes (59691) 25   Skilled Intervention Fascial release using Strain-Counterstrain of BLE-N, R foot (P)-MS    Assessments Completed   Assessments Completed She does continue to benefit from PT and is progressing slowly towards her goals. Her structural deficits do play into her symptoms but she is doing her best in spite of these. She did have good release of fascial tensions treated today.    Plan   Plan for next session Plan to con't with manual therapy to decrease fascial tension/tone to normalize ROM/decrease inflammation/decrease mm tone and improve proprioception.     Total Session Time   Timed Code Treatment Minutes 55   Total Treatment Time (sum of timed and untimed services) 55

## 2022-01-13 NOTE — PATIENT INSTRUCTIONS
Anais Kelly,    It was a pleasure to see you today at the [facility].     My recommendations after this visit include:    Stress test and echo  Reassess the size of the aorta with CT in 6 months    JORDI Tabares MD, FACC, [place]

## 2022-01-13 NOTE — LETTER
1/13/2022    Emilee Lemus MD  [address] [facility] [address]    RE: Anais Kelly       Dear Colleague,     I had the pleasure of seeing Anais Klely in the [facility].        Thank you for asking [facility] to evaluate Ms. Anais Kelly.    Assessment/Recommendations   Assessment:    Borderline to mild dilatation of ascending aorta, asymptomatic  Modest coronary calcification consistent with coronary atherosclerosis  Hypercholesterolemia on statin  Chest pain, atypical, likely musculoskeletal    Plan:  Echocardiogram to assess aortic valve( rule out bicuspid AV)  Stress test to assess atypical chest pain    We discussed  aortic dilatation.  In the absence of family history, Marfan syndrome other congenital abnormalities the risk of rupture is extremely low at the current degree of dilatation.  She needs longitudinal surveillance with another CT in about 6 months.  We may extend the interval if we prove that the aortic size is stable.  Blood pressure appears to be well controlled.  She reports that she was symptomatic when she was taking lisinopril.  I am not willing to put her on ARB at this point out of concerns for making her hypotensive/symptomatic again.       History of Present Illness    Ms. Anais Kelly is a 71 year old female who comes in for initial cardiac evaluation.  Last year she underwent CT coronary calcium scan.  The scan was mildly elevated and she had an incidental finding of mildly enlarged ascending aorta.  There was no further follow-up on those.  She has no history of known aortic disease.  She denies exertional chest pain.  She does gets nonexertional left-sided chest pain. This pain is reproducible with palpation.  She denies exertional shortness of breath.  She has not had heart palpitations or syncope.  She denies family history of aortic rupture or sudden death.  She states at 1 point time she was taking lisinopril but became  very hypotensive and symptomatic.  This medication was stopped.  She has not had elevated blood pressure since she was able to lose 50 pounds several years ago.    ECG: Personally reviewed.  5/16/2019 normal sinus rhythm within normal limits    CT coronary score: February 2020    The total Agatston calcium score is 233. A calcium score in this range places the individual in the 75th percentile when compared to an age and gender matched control group and implies a high risk of cardiac events in the next ten years.    On limited imaging the ascending aorta appears possibly mild to moderately enlarged. Consider complete CT of the thoracic aorta or MRI of the thoracic aorta to further evaluate.    Please see separate report by radiology for additional findings     CT chest: July 2020  Continued dilation of the ascending thoracic aorta measuring up to 4.1 cm in maximal diameter which is not significant changed.    Physical Examination Review of Systems   /76 (BP Location: Right arm, Patient Position: Sitting, Cuff Size: Adult Regular)   Pulse 84   Resp 16   Wt 67.6 kg (149 lb)   BMI 23.66 kg/m    Body mass index is 23.66 kg/m .  Wt Readings from Last 3 Encounters:   01/13/22 67.6 kg (149 lb)   10/21/21 67.5 kg (148 lb 12.8 oz)   06/29/21 68.5 kg (151 lb)     General Appearance:   Alert, cooperative, no distress, appears stated age   Head/ENT: Normocephalic, without obvious abnormality. Membranes moist      EYES:  no scleral icterus, normal conjunctivae   Neck: Supple, symmetrical, trachea midline, no adenopathy, thyroid: not enlarged, symmetric, no carotid bruit or JVD   Chest/Lungs:   Lungs are clear to auscultation, respirations unlabored. No tenderness or deformity    Cardiovascular:   Regular rhythm, S1, S2 normal, no murmur, rub or gallop.   Abdomen:  Soft, non-tender, bowel sounds active all four quadrants,  no masses, no organomegaly   Extremities: no cyanosis or clubbing. No edema   Skin: Skin color,  texture, turgor normal, no rashes or lesions.    Psychiatric: Normal affect, calm   Neurologic: Alert and oriented x 3, moving all four extremities.     Enc Vitals  BP: 108/76  Pulse: 84  Resp: 16  Weight: 67.6 kg (149 lb)                                          Lab Results    Chemistry/lipid CBC Cardiac Enzymes/BNP/TSH/INR   Recent Labs   Lab Test 02/10/21  1054   CHOL 195   HDL 76      TRIG 89     Recent Labs   Lab Test 02/10/21  1054 09/02/20  0920 07/08/20  0854    112 175*     Recent Labs   Lab Test 07/16/21  0957 05/26/21  1005 02/10/21  1054   NA  --   --  142   POTASSIUM  --   --  4.2   CHLORIDE  --   --  106   CO2  --   --  29   GLC  --   --  92   BUN  --   --  16   CR 0.7 0.81 0.75   GFRESTIMATED >60 >60 >60   COLLIN  --  10.5 10.3     Recent Labs   Lab Test 07/16/21  0957 05/26/21  1005 02/10/21  1054   CR 0.7 0.81 0.75     Recent Labs   Lab Test 04/17/18  0913   A1C 5.3          Recent Labs   Lab Test 02/10/21  1054   WBC 6.5   HGB 14.1   HCT 43.2   MCV 96        Recent Labs   Lab Test 02/10/21  1054 07/08/20  0854 01/28/20  1102   HGB 14.1 14.3 14.2    No results for input(s): TROPONINI in the last 86877 hours.  No results for input(s): BNP, NTBNPI, NTBNP in the last 31585 hours.  Recent Labs   Lab Test 05/26/21  1005   TSH 1.99     Recent Labs   Lab Test 05/16/19  2303   INR 1.02        Medical History  Surgical History Family History Social History   Hypertension, resolved Past Surgical History:   Procedure Laterality Date     HYSTERECTOMY  1984     IR EXTREMITY ANGIOGRAM LEFT  5/17/2019     IR EXTREMITY ANGIOGRAM LEFT  5/17/2019     OOPHORECTOMY Bilateral 1984     No premature CAD, SCD,cardiomyopathy, aortic rupture   Social History     Socioeconomic History     Marital status:      Spouse name: Not on file     Number of children: Not on file     Years of education: Not on file     Highest education level: Not on file   Occupational History     Not on file   Tobacco Use      Smoking status: Never Smoker     Smokeless tobacco: Never Used   Substance and Sexual Activity     Alcohol use: Yes     Comment: Alcoholic Drinks/day: 1 a month     Drug use: No     Sexual activity: Not on file   Other Topics Concern     Not on file   Social History Narrative    Retired.    for 52 years and has 3 boys.       Social Determinants of Health     Financial Resource Strain: Not on file   Food Insecurity: Not on file   Transportation Needs: Not on file   Physical Activity: Not on file   Stress: Not on file   Social Connections: Not on file   Intimate Partner Violence: Not on file   Housing Stability: Not on file         Medications  Allergies   Current Outpatient Medications   Medication Sig Dispense Refill     alendronate (FOSAMAX) 70 MG tablet Take 70 mg by mouth once a week       atorvastatin (LIPITOR) 10 MG tablet Take 10 mg by mouth daily       CALCIUM 600 600 MG OR TABS Take 600 mg by mouth daily        CENTRUM SILVER OR Take 1 tablet by mouth daily        Dicyclomine HCl (BENTYL PO) Take 10 mg by mouth 2 times daily       erythromycin (ROMYCIN) 5 MG/GM ophthalmic ointment APPLY A SMALL AMOUNT TO THE EYELID MARGINS NIGHTLY AT BEDTIME       famotidine (PEPCID) 20 MG tablet Take 20 mg by mouth daily as needed       omeprazole (PRILOSEC) 20 MG CR capsule Take 20 mg by mouth daily   3     PREMARIN 0.625 MG tablet Take 1 tablet by mouth once daily 90 tablet 3     RESTASIS 0.05 % ophthalmic emulsion INSTILL 1 DROP INTO BOTH EYES TWICE A DAY       traZODone (DESYREL) 50 MG tablet TAKE 1 TO 2 TABLETS BY MOUTH 1 HOUR BEFORE BEDTIME 30 tablet 0     vitamin B-12 (CYANOCOBALAMIN) 1000 MCG tablet Take 1,000 mcg by mouth daily       VITAMIN D-3 OR Take 5,000 Units by mouth daily           Allergies   Allergen Reactions     Ambien [Zolpidem] Other (See Comments)     Panic attacks     Amoxicillin Diarrhea     Celebrex [Celecoxib]      GI upset       Codeine Rash     Diphenhydramine      hyper     Onion       Other reaction(s): Heartburn, Reflux, Vomiting     Vicodin [Hydrocodone-Acetaminophen] Nausea

## 2022-01-13 NOTE — PROGRESS NOTES
Thank you, Dr. Preciado ref. provider found, for asking [facility] to evaluate Ms. Anais Kelly.      Assessment/Recommendations   Assessment:    Borderline to mild dilatation of ascending aorta, asymptomatic  Modest coronary calcification consistent with coronary atherosclerosis  Hypercholesterolemia on statin  Chest pain, atypical, likely musculoskeletal    Plan:  Echocardiogram to assess aortic valve( rule out bicuspid AV)  Stress test to assess atypical chest pain    We discussed  aortic dilatation.  In the absence of family history, Marfan syndrome other congenital abnormalities the risk of rupture is extremely low at the current degree of dilatation.  She needs longitudinal surveillance with another CT in about 6 months.  We may extend the interval if we prove that the aortic size is stable.  Blood pressure appears to be well controlled.  She reports that she was symptomatic when she was taking lisinopril.  I am not willing to put her on ARB at this point out of concerns for making her hypotensive/symptomatic again.       History of Present Illness    Ms. Anais Kelly is a 71 year old female who comes in for initial cardiac evaluation.  Last year she underwent CT coronary calcium scan.  The scan was mildly elevated and she had an incidental finding of mildly enlarged ascending aorta.  There was no further follow-up on those.  She has no history of known aortic disease.  She denies exertional chest pain.  She does gets nonexertional left-sided chest pain. This pain is reproducible with palpation.  She denies exertional shortness of breath.  She has not had heart palpitations or syncope.  She denies family history of aortic rupture or sudden death.  She states at 1 point time she was taking lisinopril but became very hypotensive and symptomatic.  This medication was stopped.  She has not had elevated blood pressure since she was able to lose 50 pounds several years ago.    ECG:  Personally reviewed.  5/16/2019 normal sinus rhythm within normal limits    CT coronary score: February 2020    The total Agatston calcium score is 233. A calcium score in this range places the individual in the 75th percentile when compared to an age and gender matched control group and implies a high risk of cardiac events in the next ten years.    On limited imaging the ascending aorta appears possibly mild to moderately enlarged. Consider complete CT of the thoracic aorta or MRI of the thoracic aorta to further evaluate.    Please see separate report by radiology for additional findings     CT chest: July 2020  Continued dilation of the ascending thoracic aorta measuring up to 4.1 cm in maximal diameter which is not significant changed.    Physical Examination Review of Systems   /76 (BP Location: Right arm, Patient Position: Sitting, Cuff Size: Adult Regular)   Pulse 84   Resp 16   Wt 67.6 kg (149 lb)   BMI 23.66 kg/m    Body mass index is 23.66 kg/m .  Wt Readings from Last 3 Encounters:   01/13/22 67.6 kg (149 lb)   10/21/21 67.5 kg (148 lb 12.8 oz)   06/29/21 68.5 kg (151 lb)     General Appearance:   Alert, cooperative, no distress, appears stated age   Head/ENT: Normocephalic, without obvious abnormality. Membranes moist      EYES:  no scleral icterus, normal conjunctivae   Neck: Supple, symmetrical, trachea midline, no adenopathy, thyroid: not enlarged, symmetric, no carotid bruit or JVD   Chest/Lungs:   Lungs are clear to auscultation, respirations unlabored. No tenderness or deformity    Cardiovascular:   Regular rhythm, S1, S2 normal, no murmur, rub or gallop.   Abdomen:  Soft, non-tender, bowel sounds active all four quadrants,  no masses, no organomegaly   Extremities: no cyanosis or clubbing. No edema   Skin: Skin color, texture, turgor normal, no rashes or lesions.    Psychiatric: Normal affect, calm   Neurologic: Alert and oriented x 3, moving all four extremities.     Enc Vitals  BP:  108/76  Pulse: 84  Resp: 16  Weight: 67.6 kg (149 lb)                                          Lab Results    Chemistry/lipid CBC Cardiac Enzymes/BNP/TSH/INR   Recent Labs   Lab Test 02/10/21  1054   CHOL 195   HDL 76      TRIG 89     Recent Labs   Lab Test 02/10/21  1054 09/02/20  0920 07/08/20  0854    112 175*     Recent Labs   Lab Test 07/16/21  0957 05/26/21  1005 02/10/21  1054   NA  --   --  142   POTASSIUM  --   --  4.2   CHLORIDE  --   --  106   CO2  --   --  29   GLC  --   --  92   BUN  --   --  16   CR 0.7 0.81 0.75   GFRESTIMATED >60 >60 >60   COLLIN  --  10.5 10.3     Recent Labs   Lab Test 07/16/21  0957 05/26/21  1005 02/10/21  1054   CR 0.7 0.81 0.75     Recent Labs   Lab Test 04/17/18  0913   A1C 5.3          Recent Labs   Lab Test 02/10/21  1054   WBC 6.5   HGB 14.1   HCT 43.2   MCV 96        Recent Labs   Lab Test 02/10/21  1054 07/08/20  0854 01/28/20  1102   HGB 14.1 14.3 14.2    No results for input(s): TROPONINI in the last 33038 hours.  No results for input(s): BNP, NTBNPI, NTBNP in the last 48220 hours.  Recent Labs   Lab Test 05/26/21  1005   TSH 1.99     Recent Labs   Lab Test 05/16/19  2303   INR 1.02        Medical History  Surgical History Family History Social History   Hypertension, resolved Past Surgical History:   Procedure Laterality Date     HYSTERECTOMY  1984     IR EXTREMITY ANGIOGRAM LEFT  5/17/2019     IR EXTREMITY ANGIOGRAM LEFT  5/17/2019     OOPHORECTOMY Bilateral 1984     No premature CAD, SCD,cardiomyopathy, aortic rupture   Social History     Socioeconomic History     Marital status:      Spouse name: Not on file     Number of children: Not on file     Years of education: Not on file     Highest education level: Not on file   Occupational History     Not on file   Tobacco Use     Smoking status: Never Smoker     Smokeless tobacco: Never Used   Substance and Sexual Activity     Alcohol use: Yes     Comment: Alcoholic Drinks/day: 1 a month     Drug  use: No     Sexual activity: Not on file   Other Topics Concern     Not on file   Social History Narrative    Retired.    for 52 years and has 3 boys.       Social Determinants of Health     Financial Resource Strain: Not on file   Food Insecurity: Not on file   Transportation Needs: Not on file   Physical Activity: Not on file   Stress: Not on file   Social Connections: Not on file   Intimate Partner Violence: Not on file   Housing Stability: Not on file         Medications  Allergies   Current Outpatient Medications   Medication Sig Dispense Refill     alendronate (FOSAMAX) 70 MG tablet Take 70 mg by mouth once a week       atorvastatin (LIPITOR) 10 MG tablet Take 10 mg by mouth daily       CALCIUM 600 600 MG OR TABS Take 600 mg by mouth daily        CENTRUM SILVER OR Take 1 tablet by mouth daily        Dicyclomine HCl (BENTYL PO) Take 10 mg by mouth 2 times daily       erythromycin (ROMYCIN) 5 MG/GM ophthalmic ointment APPLY A SMALL AMOUNT TO THE EYELID MARGINS NIGHTLY AT BEDTIME       famotidine (PEPCID) 20 MG tablet Take 20 mg by mouth daily as needed       omeprazole (PRILOSEC) 20 MG CR capsule Take 20 mg by mouth daily   3     PREMARIN 0.625 MG tablet Take 1 tablet by mouth once daily 90 tablet 3     RESTASIS 0.05 % ophthalmic emulsion INSTILL 1 DROP INTO BOTH EYES TWICE A DAY       traZODone (DESYREL) 50 MG tablet TAKE 1 TO 2 TABLETS BY MOUTH 1 HOUR BEFORE BEDTIME 30 tablet 0     vitamin B-12 (CYANOCOBALAMIN) 1000 MCG tablet Take 1,000 mcg by mouth daily       VITAMIN D-3 OR Take 5,000 Units by mouth daily           Allergies   Allergen Reactions     Ambien [Zolpidem] Other (See Comments)     Panic attacks     Amoxicillin Diarrhea     Celebrex [Celecoxib]      GI upset       Codeine Rash     Diphenhydramine      hyper     Onion      Other reaction(s): Heartburn, Reflux, Vomiting     Vicodin [Hydrocodone-Acetaminophen] Nausea

## 2022-02-22 NOTE — TELEPHONE ENCOUNTER
"Outpatient Medication Detail     Disp Refills Start End SALTY   atorvastatin (LIPITOR) 20 MG tablet 90 tablet 3 1/22/2021  No   Sig - Route: Take 1 tablet (20 mg total) by mouth daily. - Oral   Sent to pharmacy as: atorvastatin 20 mg tablet (LIPITOR)   Notes to Pharmacy: dose increase, wait until she calls before you fill the bottle   E-Prescribing Status: Receipt confirmed by pharmacy (1/22/2021  4:17 PM CST)       atorvastatin (LIPITOR) 20 MG tablet [[id number]]    Electronically signed by: Tesfaye Jimenez MD on 01/22/21 1617 Status: Active   Ordering user: Tesfaye Jimenez MD 01/22/21 1617 Authorized by: Tesfaye Jimenez MD   Frequency: DAILY 01/22/21 - Until Discontinued   Diagnoses  Hypercholesterolemia [E78.00]   Medication comments: dose increase, wait until she calls before you fill the bottle     Routing refill request to provider for review/approval because:  Labs not current:  LDL    Last office visit provider:  10/21/21     Requested Prescriptions   Pending Prescriptions Disp Refills     atorvastatin (LIPITOR) 20 MG tablet [Pharmacy Med Name: ATORVASTATIN 20 MG TABLET] 90 tablet 3     Sig: TAKE 1 TABLET BY MOUTH EVERY DAY       Statins Protocol Failed - 2/22/2022  9:17 AM        Failed - LDL on file in past 12 months     Recent Labs   Lab Test 02/10/21  1054                Passed - No abnormal creatine kinase in past 12 months     Recent Labs   Lab Test 09/02/20  0920   CKT 44                Passed - Recent (12 mo) or future (30 days) visit within the authorizing provider's specialty     Patient has had an office visit with the authorizing provider or a provider within the authorizing providers department within the previous 12 mos or has a future within next 30 days. See \"Patient Info\" tab in inbasket, or \"Choose Columns\" in Meds & Orders section of the refill encounter.              Passed - Medication is active on med list        Passed - Patient is age 18 or older        Passed - No " active pregnancy on record        Passed - No positive pregnancy test in past 12 months             Akash Salinas RN 02/22/22 9:17 AM

## 2022-03-29 NOTE — PATIENT INSTRUCTIONS
You can use feminine wash OTC and estrace vaginal cream.  You can increase trazodone to 1.5 tab at bedtime.

## 2022-03-30 NOTE — PROGRESS NOTES
(N94.9) Vaginal burning  (primary encounter diagnosis)  Comment: patient is experiencing vaginal burning and dysuria for few weeks. No vaginal discharge or bleeding. No blood in the urine. She had hysterectomy many years ago.  Plan: Wet prep and UA done today are normal.            (K58.0) Irritable bowel syndrome with diarrhea  Comment: patient recently started rifamixin prescribed by her GI.      (R30.0) Dysuria  Comment: Urine is clean today.  Plan: UA with Microscopic reflex to Culture - Clinic         Collect, Urine Microscopic            (F51.01) Primary insomnia  Comment: she feels very drowsy when takes 2 trazodone pills. She will try 1 and 1/2.    (N95.2) Atrophic vaginitis  Comment: she will start topical estrogen.  Plan: estradiol (ESTRACE) 0.1 MG/GM vaginal cream                  This note has been dictated using voice recognition software. Any grammatical or context distortions are unintentional and inherent to the software.      Return in about 1 week (around 4/5/2022) for AWV.    Patient Instructions   You can use feminine wash OTC and estrace vaginal cream.  You can increase trazodone to 1.5 tab at bedtime.          Subjective:    Anais Kelly is a 72 year old female  here for follow up.      Social History     Socioeconomic History     Marital status:      Spouse name: Not on file     Number of children: Not on file     Years of education: Not on file     Highest education level: Not on file   Occupational History     Not on file   Tobacco Use     Smoking status: Never Smoker     Smokeless tobacco: Never Used   Substance and Sexual Activity     Alcohol use: Yes     Comment: Alcoholic Drinks/day: 1 a month     Drug use: No     Sexual activity: Not on file   Other Topics Concern     Not on file   Social History Narrative    Retired.    for 52 years and has 3 boys.       Social Determinants of Health     Financial Resource Strain: Not on file   Food Insecurity: Not on file  "  Transportation Needs: Not on file   Physical Activity: Not on file   Stress: Not on file   Social Connections: Not on file   Intimate Partner Violence: Not on file   Housing Stability: Not on file       Family History   Problem Relation Age of Onset     Neurologic Disorder Mother         ms at age 30     Cancer Father 72.00        Bladder Cancer     Multiple Sclerosis Mother      No Known Problems Brother      Review of Systems:  A 12 point comprehensive review of systems was negative except as noted in HPI.        Objective:    Physical Exam   /88 (BP Location: Right arm, Patient Position: Sitting)   Pulse 75   Ht 1.676 m (5' 6\")   Wt 68.5 kg (151 lb)   SpO2 98%   BMI 24.37 kg/m    Body mass index is 24.37 kg/m .    Constitutional: oriented to person, place, and time, appears well-nourished. No distress.   HENT:   Head: Normocephalic.   Eyes: Conjunctivae are normal.     Pulmonary/Chest: Effort normal  Abdominal: Soft.   Pelvic: normal external genitalia, no discharge, no redness.  Musculoskeletal: Normal range of motion.   Neurological: alert and oriented to person, place, and time.  Psychiatric:  normal mood and affect.      Patient Active Problem List   Diagnosis     Irritable bowel syndrome with diarrhea     Primary insomnia     Small fiber neuropathy (H); diagnosed by neurology     Hormone replacement therapy, postmenopausal     Age-related osteoporosis without current pathological fracture     Ascending aorta dilatation (H)     Coronary artery calcification seen on CAT scan     Hyperlipidemia LDL goal <70       Current Outpatient Medications   Medication     alendronate (FOSAMAX) 70 MG tablet     atorvastatin (LIPITOR) 20 MG tablet     CALCIUM 600 600 MG OR TABS     CENTRUM SILVER OR     Dicyclomine HCl (BENTYL PO)     erythromycin (ROMYCIN) 5 MG/GM ophthalmic ointment     estradiol (ESTRACE) 0.1 MG/GM vaginal cream     famotidine (PEPCID) 20 MG tablet     loperamide (IMODIUM) 2 MG capsule     " neomycin-polymyxin-dexamethasone (MAXITROL) 3.5-05943-6.1 SUSP ophthalmic susp     omeprazole (PRILOSEC) 20 MG CR capsule     PREMARIN 0.625 MG tablet     RESTASIS 0.05 % ophthalmic emulsion     rifaximin (XIFAXAN) 550 MG TABS tablet     traZODone (DESYREL) 50 MG tablet     vitamin B-12 (CYANOCOBALAMIN) 1000 MCG tablet     VITAMIN D-3 OR     No current facility-administered medications for this visit.               Emilee Lemus MD  3/29/2022  Answers for HPI/ROS submitted by the patient on 3/29/2022  How many servings of fruits and vegetables do you eat daily?: 2-3  On average, how many sweetened beverages do you drink each day (Examples: soda, juice, sweet tea, etc.  Do NOT count diet or artificially sweetened beverages)?: 0  How many minutes a day do you exercise enough to make your heart beat faster?: 9 or less  How many days per week do you miss taking your medication?: 0  What is the reason for your visit today?: Follow up visit  When did your symptoms begin?: 1-3 days ago  What are your symptoms?: Irritation in vaginal area  How would you describe these symptoms?: Mild  Have you had these symptoms before?: No

## 2022-04-07 NOTE — PROGRESS NOTES
"SUBJECTIVE:   Anais Kelly is a 72 year old female who presents for Preventive Visit.      Patient has been advised of split billing requirements and indicates understanding: Yes  Are you in the first 12 months of your Medicare coverage?  No    Healthy Habits:     In general, how would you rate your overall health?  Good    Frequency of exercise:  None    Do you usually eat at least 4 servings of fruit and vegetables a day, include whole grains    & fiber and avoid regularly eating high fat or \"junk\" foods?  Yes    Taking medications regularly:  Yes    Ability to successfully perform activities of daily living:  No assistance needed    Home Safety:  Lack of grab bars in the bathroom    Hearing Impairment:  No hearing concerns    In the past 6 months, have you been bothered by leaking of urine?  No    In general, how would you rate your overall mental or emotional health?  Excellent      PHQ-2 Total Score: 0    Additional concerns today:  No    Do you feel safe in your environment? Yes    Have you ever done Advance Care Planning? (For example, a Health Directive, POLST, or a discussion with a medical provider or your loved ones about your wishes): Yes, advance care planning is on file.       Fall risk  Fallen 2 or more times in the past year?: (P) No  Any fall with injury in the past year?: (P) No    Cognitive Screening   1) Repeat 3 items (Leader, Season, Table)    2) Clock draw: NORMAL  3) 3 item recall: Recalls 3 objects  Results: 3 items recalled: COGNITIVE IMPAIRMENT LESS LIKELY    Mini-CogTM Copyright ANIA Ramos. Licensed by the author for use in [facility]; reprinted with permission (hyun@.[facility]). All rights reserved.      Do you have sleep apnea, excessive snoring or daytime drowsiness?: no    Reviewed and updated as needed this visit by clinical staff   Tobacco  Allergies  Meds              Reviewed and updated as needed this visit by Provider                 Social History     Tobacco Use " "    Smoking status: Never Smoker     Smokeless tobacco: Never Used   Substance Use Topics     Alcohol use: Yes     Comment: Alcoholic Drinks/day: 1 a month     If you drink alcohol do you typically have >3 drinks per day or >7 drinks per week? No    Alcohol Use 4/7/2022   Prescreen: >3 drinks/day or >7 drinks/week? Not Applicable   Prescreen: >3 drinks/day or >7 drinks/week? -               Current providers sharing in care for this patient include:   Patient Care Team:  Emilee Lemus MD as PCP - General (Internal Medicine)  Lissy Ortega, PharmD as Pharmacist (Pharmacist)  Emilee Lemus MD as Assigned PCP  Zander Tabares MD as Assigned Heart and Vascular Provider    The following health maintenance items are reviewed in Epic and correct as of today:  Health Maintenance Due   Topic Date Due     ANNUAL REVIEW OF HM ORDERS  Never done     FALL RISK ASSESSMENT  02/10/2022               Review of Systems  CONSTITUTIONAL: NEGATIVE for fever, chills, change in weight  INTEGUMENTARY/SKIN: NEGATIVE for worrisome rashes, moles or lesions  EYES: NEGATIVE for vision changes or irritation  ENT/MOUTH: NEGATIVE for ear, mouth and throat problems  RESP: NEGATIVE for significant cough or SOB  BREAST: NEGATIVE for masses, tenderness or discharge  CV: NEGATIVE for chest pain, palpitations or peripheral edema  GI: NEGATIVE for nausea, abdominal pain, heartburn, or change in bowel habits  : NEGATIVE for frequency, dysuria, or hematuria  MUSCULOSKELETAL: NEGATIVE for significant arthralgias or myalgia  NEURO: NEGATIVE for weakness, dizziness or paresthesias  ENDOCRINE: NEGATIVE for temperature intolerance, skin/hair changes  HEME: NEGATIVE for bleeding problems  PSYCHIATRIC: NEGATIVE for changes in mood or affect    OBJECTIVE:   /84 (BP Location: Right arm, Patient Position: Sitting, Cuff Size: Adult Regular)   Pulse 77   Ht 1.676 m (5' 6\")   Wt 68.5 kg (151 lb)   SpO2 98%   BMI 24.37 kg/m   Estimated body mass index " "is 24.37 kg/m  as calculated from the following:    Height as of this encounter: 1.676 m (5' 6\").    Weight as of this encounter: 68.5 kg (151 lb).  Physical Exam  General Appearance: Alert, cooperative, no distress, appears stated age.  Head: Normocephalic, without obvious abnormality, atraumatic  Eyes: PERRL, conjunctiva/corneas clear, EOM's intact  Ears: Normal TM's and external ear canals, both ears  Nose: Nares normal, septum midline,mucosa normal, no drainage  Throat: Lips, mucosa, and tongue normal; teeth and gums normal  Neck: Supple, symmetrical, trachea midline, no adenopathy;  thyroid: not enlarged, symmetric, no tenderness/mass/nodules; no carotid bruit or JVD  Back: Symmetric, no curvature, ROM normal, no CVA tenderness.  Lungs: Clear to auscultation bilaterally, respirations unlabored.  Breasts: No breast masses in the left, but palpable thickness in the right lower quadrant, no tenderness, asymmetry, or nipple discharge.  Heart: Regular rate and rhythm, S1 and S2 normal, no murmur, rub, or gallop.  Abdomen: Soft, non-tender, bowel sounds active all four quadrants,  no masses, no organomegaly.  Extremities: Extremities normal, atraumatic, no cyanosis or edema.  Skin: Skin color, texture, turgor normal, no rashes or lesions.  Lymph nodes: Cervical, supraclavicular, and axillary nodes normal.  Neurologic: No focal neurological findings.          ASSESSMENT / PLAN:   (Z00.00) Encounter for Medicare annual wellness exam  (primary encounter diagnosis)      (F51.01) Primary insomnia  Comment: Taking 1 1/2 pill at bedtime and that does work well on most of the nights.  Plan: traZODone (DESYREL) 50 MG tablet            (M81.0) Age-related osteoporosis without current pathological fracture  Comment: On Fosamax since 5/2021. Last DXA scan done in 2/2020.  Plan: DX Hip/Pelvis/Spine, Vitamin D Deficiency,         alendronate (FOSAMAX) 70 MG tablet            (I77.810) Ascending aorta dilatation (H)  Comment: Due " "for CT chest. She saw Cardiologist Dr Tabares in 1/2022. He recommended stress test. She had ECHO done.  Plan: CTA Chest with Contrast, NM Lexiscan stress         test, CBC with platelets            (E78.5) Hyperlipidemia LDL goal <70  Comment: on statin  Plan: NM Lexiscan stress test, Comprehensive         metabolic panel, Lipid panel reflex to direct         LDL Fasting, TSH with free T4 reflex            (G62.9) Small fiber neuropathy (H); diagnosed by neurology  Comment: mostly pain in her feet  Plan: Adult Neurology  Referral, CBC with         platelets            (I25.10) Coronary artery calcification seen on CAT scan  Comment: Modest coronary calcification consistent with coronary atherosclerosis  Plan: NM Lexiscan stress test            (N63.10) Lump of right breast  Comment: On the exam today, she will schedule mammogram and US  Plan: MA Screening Digital Right, US Breast Right         Limited 1-3 Quadrants            (N63.15) Unspecified lump in the right breast, overlapping quadrants   Comment:   Plan: MA Screening Digital Right            (N63.14) Unspecified lump in the right breast, lower inner quadrant   Comment:   Plan: US Breast Right Limited 1-3 Quadrants              Patient has been advised of split billing requirements and indicates understanding: Yes    COUNSELING:  Reviewed preventive health counseling, as reflected in patient instructions       Regular exercise    Estimated body mass index is 24.37 kg/m  as calculated from the following:    Height as of this encounter: 1.676 m (5' 6\").    Weight as of this encounter: 68.5 kg (151 lb).        She reports that she has never smoked. She has never used smokeless tobacco.      Appropriate preventive services were discussed with this patient, including applicable screening as appropriate for cardiovascular disease, diabetes, osteopenia/osteoporosis, and glaucoma.  As appropriate for age/gender, discussed screening for colorectal cancer, " prostate cancer, breast cancer, and cervical cancer. Checklist reviewing preventive services available has been given to the patient.    Reviewed patients plan of care and provided an AVS. The Basic Care Plan (routine screening as documented in Health Maintenance) for Anais meets the Care Plan requirement. This Care Plan has been established and reviewed with the Patient.    Counseling Resources:  ATP IV Guidelines  Pooled Cohorts Equation Calculator  Breast Cancer Risk Calculator  Breast Cancer: Medication to Reduce Risk  FRAX Risk Assessment  ICSI Preventive Guidelines  Dietary Guidelines for Americans, 2010  USDA's MyPlate  ASA Prophylaxis  Lung CA Screening    Emilee Lemus MD  [facility]    Identified Health Risks:

## 2022-04-07 NOTE — PATIENT INSTRUCTIONS
To schedule NM stress test and CT angiogram.  Consult with Neurology.    To schedule US and mammo right breast.    Come for fasting labs.      Patient Education   Personalized Prevention Plan  You are due for the preventive services outlined below.  Your care team is available to assist you in scheduling these services.  If you have already completed any of these items, please share that information with your care team to update in your medical record.  Health Maintenance Due   Topic Date Due     ANNUAL REVIEW OF  ORDERS  Never done     FALL RISK ASSESSMENT  02/10/2022       Exercise for a Healthier Heart  You may wonder how you can improve the health of your heart. If you re thinking about exercise, you re on the right track. You don t need to become an athlete. But you do need a certain amount of brisk exercise to help strengthen your heart. If you have been diagnosed with a heart condition, your healthcare provider may advise exercise to help stabilize your condition. To help make exercise a habit, choose safe, fun activities.      Exercise with a friend. When activity is fun, you're more likely to stick with it.   Before you start  Check with your healthcare provider before starting an exercise program. This is especially important if you have not been active for a while. It's also important if you have a long-term (chronic) health problem such as heart disease, diabetes, or obesity. Or if you are at high risk for having these problems.   Why exercise?  Exercising regularly offers many healthy rewards. It can help you do all of the following:     Improve your blood cholesterol level to help prevent further heart trouble    Lower your blood pressure to help prevent a stroke or heart attack    Control diabetes, or reduce your risk of getting this disease    Improve your heart and lung function    Reach and stay at a healthy weight    Make your muscles stronger so you can stay active    Prevent falls and  fractures by slowing the loss of bone mass (osteoporosis)    Manage stress better    Reduce your blood pressure    Improve your sense of self and your body image  Exercise tips      Ease into your routine. Set small goals. Then build on them. If you are not sure what your activity level should be, talk with your healthcare provider first before starting an exercise routine.    Exercise on most days. Aim for a total of 150 minutes (2 hours and 30 minutes) or more of moderate-intensity aerobic activity each week. Or 75 minutes (1 hour and 15 minutes) or more of vigorous-intensity aerobic activity each week. Or try for a combination of both. Moderate activity means that you breathe heavier and your heart rate increases but you can still talk. Think about doing 40 minutes of moderate exercise, 3 to 4 times a week. For best results, activity should last for about 40 minutes to lower blood pressure and cholesterol. It's OK to work up to the 40-minute period over time. Examples of moderate-intensity activity are walking 1 mile in 15 minutes. Or doing 30 to 45 minutes of yard work.    Step up your daily activity level.  Along with your exercise program, try being more active the whole day. Walk instead of drive. Or park further away so that you take more steps each day. Do more household tasks or yard work. You may not be able to meet the advised mount of physical activity. But doing some moderate- or vigorous-intensity aerobic activity can help reduce your risk for heart disease. Your healthcare provider can help you figure out what is best for you.    Choose 1 or more activities you enjoy.  Walking is one of the easiest things you can do. You can also try swimming, riding a bike, dancing, or taking an exercise class.    When to call your healthcare provider  Call your healthcare provider if you have any of these:     Chest pain or feel dizzy or lightheaded    Burning, tightness, pressure, or heaviness in your chest, neck,  shoulders, back, or arms    Abnormal shortness of breath    More joint or muscle pain    A very fast or irregular heartbeat (palpitations)  Mayra last reviewed this educational content on 7/1/2019 2000-2021 [employer]. All rights reserved. This information is not intended as a substitute for professional medical care. Always follow your healthcare professional's instructions.

## 2022-04-07 NOTE — TELEPHONE ENCOUNTER
Chiqui @ radiology called stating the referral placed for patient was enter incorrectly. Please advise the referral should be Diagnosis MA bilateral. Chiqui put a note in as-well    Lisa Guillen

## 2022-04-08 NOTE — TELEPHONE ENCOUNTER
"Reason for Call: Request for an order or referral: mammogram order was entered incorrectly. needs to be corrected to diagnostic bilateral before Pt can be scheduled    Order or referral being requested: mammogram order was entered incorrectly. needs to be corrected to diagnostic bilateral before Pt can be scheduled    Date needed: as soon as possible    Has the patient been seen by the PCP for this problem? YES    Additional comments: Ivis @ [facility] called and stated PCP entered the mammogram order incorrectly. Ivis stated PCP will need the order to be changed to \"diagnostic bilateral\". Ivis stated everything in the U/S order was correct so no need to change that. Please correct the order so Pt can be called back and appointment can be scheduled.    Best Time:  any    Can we leave a detailed message on this number?  YES    Call taken on 4/8/2022 at 3:25 PM by Rossy Ryan    "

## 2022-04-08 NOTE — TELEPHONE ENCOUNTER
Order pended.  Angelika Merlos [facility] ............... 1:29 PM, 04/08/22

## 2022-04-12 NOTE — PROGRESS NOTES
[facility]    OUTPATIENT PHYSICAL THERAPY  PLAN OF TREATMENT FOR OUTPATIENT REHABILITATION AND PROGRESS NOTE           Patient's Last Name, First Name, Anais Murphy Date of Birth  1950   Provider's Name  [facility] Medical Record No.  [id number]    Onset Date  3/9/21 Start of Care Date  3/24/21   Type:     _X_PT   ___OT   ___SLP Medical Diagnosis  Chronic back pain   PT Diagnosis  Chronic back pain Plan of Treatment  Frequency/Duration: 1x/week  Certification date from 3/21/22 to 6/20/22     Goals:  Goal Identifier 1   Goal Description Pt. will demonstrate/verbalize independence in self-management of condition in : 12 weeks   Target Date     Date Met      Progress (detail required for progress note): SHE IS PROGRESSING AND HAS BEEN FAIRLY COMPLIANT WITH HEP TO THIS POINT.      Goal Identifier 2   Goal Description Pt. will report decreased intensity, frequency of : Pain;in 12 weeks   Target Date     Date Met      Progress (detail required for progress note): SLOW PROGRESSION. PAIN RANGE IS FROM 2-7/10.      Goal Identifier 3   Goal Description Pt. will be able to walk : 30 minutes;on even surfaces;with less difficulty;for household mobility;for community mobility;for exercise/recreation;in 12 weeks   Target Date     Date Met      Progress (detail required for progress note): GOAL MET - this has been declining due to the weather and her worsening knee function but she is able to do this.      Goal Identifier 4   Goal Description Patient will decrease : TREVER score;by 10 points;for improved quality of function;for improved quality of life;in 12 weeks   Target Date     Date Met      Progress (detail required for progress note):       Goal Identifier     Goal Description     Target Date     Date Met      Progress (detail required for progress note):        Goal Identifier     Goal Description     Target Date     Date Met      Progress (detail required for progress note):       Goal Identifier     Goal Description     Target Date     Date Met      Progress (detail required for progress note):       Goal Identifier     Goal Description     Target Date     Date Met      Progress (detail required for progress note):            I CERTIFY THE NEED FOR THESE SERVICES FURNISHED UNDER        THIS PLAN OF TREATMENT AND WHILE UNDER MY CARE     (Physician co-signature of this document indicates review and certification of the therapy plan).              Referring Provider: MD JANES Ramey, PT         04/12/22 1500   Signing Clinician's Name / Credentials   Signing clinician's name / credentials Janes Hernandez PT   Session Number   Session Number 25   Progress Note/Recertification   Recertification Due Date 03/21/22   Adult Goals   PT Ortho Eval Goals 1;2;3;4   Ortho Goal 1   Goal Identifier 1   Goal Description Pt. will demonstrate/verbalize independence in self-management of condition in : 12 weeks   Goal Progress SHE IS PROGRESSING AND HAS BEEN FAIRLY COMPLIANT WITH HEP TO THIS POINT.    Ortho Goal 2   Goal Identifier 2   Goal Description Pt. will report decreased intensity, frequency of : Pain;in 12 weeks   Goal Progress SLOW PROGRESSION. PAIN RANGE IS FROM 2-7/10.    Ortho Goal 3   Goal Identifier 3   Goal Description Pt. will be able to walk : 30 minutes;on even surfaces;with less difficulty;for household mobility;for community mobility;for exercise/recreation;in 12 weeks   Goal Progress GOAL MET - this has been declining due to the weather and her worsening knee function but she is able to do this.    Ortho Goal 4   Goal Identifier 4   Goal Description Patient will decrease : TREVER score;by 10 points;for improved quality of function;for improved quality of life;in 12 weeks   Subjective Report   Subjective Report She did see her MD last week. She  is going to now have a plethora of tests to double check many things. 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 The back has been mostly good. She has had more difficulty with sleeping on her L side which does throw the back off in the mornings but it does seem to work out better.   Objective Measures   Objective Measures Objective Measure 1   Objective Measure 1   Objective Measure LV, visc fascial tensions.   Treatment Interventions   Interventions Therapeutic Procedure/Exercise;Neuromuscular Re-education;Manual Therapy   Therapeutic Procedure/exercise   Therapeutic Procedures: strength, endurance, ROM, flexibillity minutes (24711) 15   Skilled Intervention AA/PROM to BLE, TL spine. Ed on sleeping position with pillow between knees.    Neuromuscular Re-education   Neuromuscular re-ed of mvmt, balance, coord, kinesthetic sense, posture, proprioception minutes (09907) 15   Skilled Intervention Recip inhib of LV, visc fascia   Manual Therapy   Manual Therapy: Mobilization, MFR, MLD, friction massage minutes (37332) 25   Skilled Intervention Fascial release using Strain-Counterstrain of B scap/sternal-LV, B lower/middle abd-V.   Assessments Completed   Assessments Completed She has had difficutly getting in due to access and other issues. She does still seem to be making progress towards her goals overall. She does remain appropriate for skilled PT.   Plan   Plan for next session Plan to con't with manual therapy to decrease fascial tension/tone to normalize ROM/decrease inflammation/decrease mm tone and improve proprioception.     Total Session Time   Timed Code Treatment Minutes 55   Total Treatment Time (sum of timed and untimed services) 55

## 2022-04-21 NOTE — ED PROVIDER NOTES
EMERGENCY DEPARTMENT ENCOUNTER      NAME: Anais Kelly  AGE: 72 year old female  YOB: 1950  MRN: [id number]  EVALUATION DATE & TIME: 4/21/2022  1:22 PM    PCP: Emilee Lemus    ED PROVIDER: Massiel Thorpe      Chief complaint:   Cardiac arrest    FINAL IMPRESSION:  1. Cardiac arrest (H)      ED COURSE & MEDICAL DECISION MAKING:    Pertinent Labs & Imaging studies reviewed. (See chart for details)  72 year old female presents to the Emergency Department for evaluation of chest pain followed by cardiac arrest.  Patient has known ascending aorta aneurysm.  Described as 4 cm.  Has a history of hyperlipidemia.  History of coronary artery calcification on CT scan.  Recent history of the development of a breast lump with plan for biopsy.  Had onset of chest pain today followed by what sounds like a syncope or near syncopal episode.  EMS was called to the house.  Initially the patient was awake alert and talking.  They initiated transport and patient had sudden cardiac arrest in transport.  This was not preceded by an arrhythmia.  EMS reporting that the patient had sudden profound rapid bradycardia followed by asystole.  Patient was initiated on Rodrick chest compressions received epinephrine via an IO placed in the right leg by EMS and subsequently was transported to the emergency department.  I assessed the patient at arrival.  She was undergoing cardiopulmonary resuscitation with the Rodrick.  Pupils were noted to be enlarged and nonreactive bilaterally.  No neurological function at arrival to the emergency department.  We administered another dose of epinephrine.  Multiple peripheral IVs placed and a second IO placed in the left lower extremity.  Patient was noted to be in PEA with no palpable pulse.  Subsequently replaced the gel airway with a definitive orotracheal airway which was visualized going across the cords.  Over the course of the evaluation patient received multiple ACLS medications with no  return of spontaneous circulation.  More specifically multiple dosages of epinephrine, received bicarbonate, received calcium.  Out of concern for possible ACS or massive PE I did opt to also administer the patient lytics at the bedside.  ECG noted by EMS prior to cardiopulmonary arrest demonstrating ST depressions in leads 4 5 and V6 with ST elevation in aVR there was also depression noted in lead II.  This could suggest acute coronary event.  No response to these interventions.  CPR continued throughout via Rodrick.  I performed bedside ultrasound multiple times over the course of her evaluation which demonstrated no coordinated cardiac activity there was no appreciable pericardial effusion.  I also performed a limited ultrasound of her abdomen looking for free fluid and could not identify any free fluid in the abdomen the aorta in the abdomen was not clearly visualized.  We continued with ACLS medications in attempt to restore cardiovascular function but this was unsuccessful.  The patient had had 10 to 15 minutes of CPR prior to arrival in the emergency department.  We are approximately at roughly 45 minutes of CPR in the emergency department when function deteriorated further with no evidence of PEA or contractile activity by the heart.  No discernible cardiac function at this time.  I went and discussed this all with the family and ultimately made the decision to end the resuscitation and pronounced the patient.  I spent time with the family updating them on the attempts and interventions here in the emergency department discussing with them the events leading to the cardiac arrest, the events of the resuscitation and ultimately patient's death.       12:55 PM Initial time of arrest en route per EMS. IO line placed and 1 epinephrine given en route.  1:14 PM Patient arrives in ED via EMS bagged and with LUCUS in place.   1:16 PM 1 mg Epi given  1:17 PM Pulse check PEA  1:18 PM 1 amp calcium  1:20 PM 1 mg epi  given  1:20 PM patient intubated   1:22 PM pulse check PEA  1:22 PM No effusion or tamponade on cardiac us.   1:24 PM 1 mg epi given  1:25 PM Pulse check PEA  1:28 PM Pulse check PEA  1:30 PM Pulse check PEA  1:31 PM cardiac US  1:32 PM 1 mg epi given   1:34 PM Pulse check PEA  1:38 PM Pulse check PEA  1:39 PM 41 mg lytics given  1:40 PM 1 mg epi given  1:42 PM pulse check PEA  1:44 PM bicarb given  1:46  blood sugar  1:46 PM Patient's family is in the waiting room, updated them.   1:47 PM Pulse check PEA  1:50 PM Patient's family came back to the room   1:52 PM Time of death.     MEDICATIONS GIVEN IN THE EMERGENCY:  Medications   alteplase 1 mg/mL (ACTIVASE) bolus (FOR PE with CARDIAC ARREST) 41 mg (has no administration in time range)       NEW PRESCRIPTIONS STARTED AT TODAY'S ER VISIT  New Prescriptions    No medications on file          =================================================================    HPI    Patient information was obtained from: EMS    Use of : N/A         Anais Kelly is a 72 year old female with a pertinent history of ascending aortic aneurysm, CAD, who presents to this ED via EMS for evaluation of cardiac arrest.    Per EMS, initial call was for back and leg numbness. Patient reported sudden severe chest pain and then fell to the ground. She got up and went to go to the bathroom but then experienced severe bilateral lower extremity pain and numbness. Patient was noted to be clammy and sweaty per EMS and arrested at 1255 while en route to ED. IO placed and 1 round of epinephrine given.    History is limited due to acuity of condition/cardiac arrest.    REVIEW OF SYSTEMS   Review of Systems   Unable to perform ROS: Acuity of condition      PAST MEDICAL HISTORY:  Known thoracic aneurysm  Known coronary artery calcification  Recent diagnosis of breast lump currently being worked up on an outpatient basis.    PAST SURGICAL HISTORY:  Past Surgical History:   Procedure  Laterality Date     HYSTERECTOMY  1984     IR EXTREMITY ANGIOGRAM LEFT  5/17/2019     IR EXTREMITY ANGIOGRAM LEFT  5/17/2019     OOPHORECTOMY Bilateral 1984     CURRENT MEDICATIONS:    alendronate (FOSAMAX) 70 MG tablet  atorvastatin (LIPITOR) 10 MG tablet  CALCIUM 600 600 MG OR TABS  CENTRUM SILVER OR  Dicyclomine HCl (BENTYL PO)  erythromycin (ROMYCIN) 5 MG/GM ophthalmic ointment  estradiol (ESTRACE) 0.1 MG/GM vaginal cream  famotidine (PEPCID) 20 MG tablet  loperamide (IMODIUM) 2 MG capsule  neomycin-polymyxin-dexamethasone (MAXITROL) 3.5-03339-3.1 SUSP ophthalmic susp  omeprazole (PRILOSEC) 20 MG CR capsule  PREMARIN 0.625 MG tablet  RESTASIS 0.05 % ophthalmic emulsion  traZODone (DESYREL) 50 MG tablet  vitamin B-12 (CYANOCOBALAMIN) 1000 MCG tablet  VITAMIN D-3 OR      ALLERGIES:  Allergies   Allergen Reactions     Ambien [Zolpidem] Other (See Comments)     Panic attacks     Amoxicillin Diarrhea     Celebrex [Celecoxib]      GI upset       Codeine Rash     Diphenhydramine      hyper     Onion      Other reaction(s): Heartburn, Reflux, Vomiting     Vicodin [Hydrocodone-Acetaminophen] Nausea       FAMILY HISTORY:  Family History   Problem Relation Age of Onset     Neurologic Disorder Mother         ms at age 30     Cancer Father 72.00        Bladder Cancer     Multiple Sclerosis Mother      No Known Problems Brother        SOCIAL HISTORY:   Social History     Socioeconomic History     Marital status:    Tobacco Use     Smoking status: Never Smoker     Smokeless tobacco: Never Used   Substance and Sexual Activity     Alcohol use: Yes     Comment: Alcoholic Drinks/day: 1 a month     Drug use: No   Social History Narrative    Retired.    for 52 years and has 3 boys.         VITALS:  Patient in cardiopulmonary arrest    PHYSICAL EXAM    PHYSICAL EXAM    Constitutional: Unresponsive critically ill and extremis receiving cardiopulmonary resuscitation.  HENT: Gel airway in place at arrival to the emergency  department.  Eyes: Pupils dilated and fixed bilaterally  Respiratory: Receiving bag ventilation through gel airway decreased breath sounds throughout with rhonchi and secretions noted  Cardiovascular: No palpable carotid pulse no palpable femoral pulse  GI: Abdomen is soft it does not appear to be distended.  : No cva tenderness    Musculoskeletal: No edema to the lower extremities appreciated.  Integument: No rash appreciated.  Neurologic: Patient is completely unresponsive with no neurological function by clinical examination.    LAB:  All pertinent labs reviewed and interpreted.  Results for orders placed or performed during the hospital encounter of 04/21/22   Extra Blue Top Tube   Result Value Ref Range    Hold Specimen JIC    Extra Red Top Tube   Result Value Ref Range    Hold Specimen JIC    Extra Green Top (Lithium Heparin) Tube   Result Value Ref Range    Hold Specimen JIC    Extra Purple Top Tube   Result Value Ref Range    Hold Specimen JIC    Extra Purple Top Tube   Result Value Ref Range    Hold Specimen JIC    Glucose by meter   Result Value Ref Range    GLUCOSE BY METER POCT 188 (H) 70 - 99 mg/dL       PROCEDURES:     Critical Care     Performed by: Dr. Bai  Total critical care time: 45 minutes  Critical care was necessary to treat or prevent imminent or life-threatening deterioration of the following conditions:  Cardiopulmonary arrest requiring multiple resuscitative measures  Critical care was time spent personally by me on the following activities: development of treatment plan with patient or surrogate, discussions with consultants, examination of patient, evaluation of patient's response to treatment, obtaining history from patient or surrogate, ordering and performing treatments and interventions, ordering and review of laboratory studies, ordering and review of radiographic studies, re-evaluation of patient's condition and monitoring for potential decompensation.  Critical care time was  exclusive of separately billable procedures and treating other patients.    [facility]    -Intubation    Date/Time: 4/21/2022 1:30 PM  Performed by: Hussein Bai MD  Authorized by: Hussein Bai MD     Emergent condition/consent implied      PRE-PROCEDURE DETAILS     Patient status:  Unresponsive    Pretreatment medications:  None  PROCEDURE DETAILS     Preoxygenation:  None    CPR in progress: yes      Intubation method:  Oral    Oral intubation technique:  Video-assisted    Tube size (mm):  7.5    Tube type:  Cuffed    Number of attempts:  1    Cricoid pressure: yes      Tube visualized through cords: yes      PLACEMENT ASSESSMENT     Tube secured with:  ETT hennessy    Breath sounds:  Equal and absent over the epigastrium    Placement verification: chest rise, direct visualization and equal breath sounds      CXR findings:  ETT in proper place    PROCEDURE    Patient Tolerance:  Patient tolerated the procedure well with no immediate complications              I, Massiel Thorpe, am serving as a scribe to document services personally performed by Dr. Richardson MD based on my observation and the provider's statements to me. I, Dr. Richardson MD, attest that Massiel Thorpe is acting in a scribe capacity, has observed my performance of the services and has documented them in accordance with my direction.    Hussein Bai MD  Emergency Medicine  [facility] EMERGENCY DEPARTMENT  [facility] [facility] [id number] [phone]       Hussein Bai MD  04/21/22 9129

## 2022-04-21 NOTE — ED NOTES
Pt arrived 1314 in full pea. Rodrick on pt and et present and bagging being doen.  Pt moved to bed in ed and rodrick maintained and bagging via et continued..    Reported was feeling leg paralysis and pain and told  to call ems.    Hx of aortic aneyrseum.  Pt was in truck with ems when went into pea and cpr started immediately.  1 round of epi and I/o in field.    See code blue documents.

## 2022-04-21 NOTE — PROGRESS NOTES
SPIRITUAL HEALTH SERVICES NOTE  [facility], ED    CARE PROVIDED    Grief support    SPIRITUAL CARE NOTE  Requested by unit staff to meet with Sraa's family while she was undergoing CPR. Met with Sara's  Brandon, son Hussein and two other sons and a DIL that arrived. Brandon shared that Sara was scheduled for a breast biopsy today at [facility], something that was causing her stress. He says that they were at home when Sara had what he thought was a stroke. He called 911 a short while later. Brandon notes that Sara has a known aortic aneurysm and that she tends to worry about things. He shares that she had just surprised him on Saturday night with an unexpected party for his 73th birthday. He says that she is a very strong and capable woman. While we were talking, Dr. Hussein Bai came in to tell the family, that despite their best efforts, they were not able restart Sara's heart. Family was allowed into the exam room to say goodbye and Brandon asked that the LUCINA machine be stopped. Prayer shared.    Brandon says that Sara is listed as an organ donor and, as such, the family is open to organ donation. He notes that she was not wearing her wedding ring when she came in. I explained the process for  homes. Brandon states that Sara wanted to be cremated. The family will notify staff when they have selected who they want to work with. Brandon notes that just this morning, Sara paid for a cruise for the entire family to go together in July. I validated how traumatic this experience is, and encouraged them to be gentle with themselves in the months ahead.     Visit Length: 70 minutes    Plan of Care: Will remain available for further support as patient/family needs/desires.    Marcella Carreon M.Div.      Office: [phone] (for non-urgent requests)  Please Vocera or page through [facility] for time-sensitive requests

## 2022-04-21 NOTE — ED NOTES
[facility] will call back if family gives consent to eye donation   then body to be release ed to [facility]  home [phone]    Body will be cremated but [facility] will manage.

## 2022-04-22 ENCOUNTER — TELEPHONE (OUTPATIENT)
Dept: INTERNAL MEDICINE | Facility: CLINIC | Age: 72
End: 2022-04-22
Payer: COMMERCIAL

## 2022-04-22 NOTE — TELEPHONE ENCOUNTER
Reason for Call:  Other call back    Detailed comments: [facility] Medical Examiner's office called and is needing a call back. Please call [phone] and ask to speak to any investigator    Phone Number Patient can be reached at: Other phone number:  [phone]    Best Time: any    Can we leave a detailed message on this number? YES    Call taken on 4/22/2022 at 9:00 AM by Felipe Willams

## 2022-04-27 NOTE — TELEPHONE ENCOUNTER
Are you okay to sign the death certificate?  Angelika Merlos CMA ............... 7:28 AM, 04/27/22

## 2022-04-27 NOTE — TELEPHONE ENCOUNTER
I called medical examiner office back and they were just looking for the death certificate which was signed. Nothing further needed.  Angelika Merlos CMA ............... 1:41 PM, 04/27/22

## 2022-10-12 NOTE — ADDENDUM NOTE
Encounter addended by: Bennie Hernandez, PT on: 10/12/2022 7:41 AM   Actions taken: Episode resolved, Clinical Note Signed

## 2022-10-12 NOTE — PROGRESS NOTES
[facility]    Outpatient Physical Therapy Discharge Note  Patient: Anais Kelly  : 1950    Beginning/End Dates of Reporting Period:  21 to 22    Referring Provider: Dr. Gordon    Therapy Diagnosis: LBP     Client Self Report: She did see her MD last week. She is going to now have a plethora of tests to double check many things. 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 The back has been mostly good. She has had more difficulty with sleeping on her L side which does throw the back off in the mornings but it does seem to work out better.    Objective Measurements:      Outcome Measures (most recent score):      Goals:      Plan:  Discharge from therapy.    Discharge:    Reason for Discharge: Patient passed away    Equipment Issued:     Discharge Plan: